# Patient Record
Sex: MALE | Race: WHITE | NOT HISPANIC OR LATINO | Employment: FULL TIME | URBAN - METROPOLITAN AREA
[De-identification: names, ages, dates, MRNs, and addresses within clinical notes are randomized per-mention and may not be internally consistent; named-entity substitution may affect disease eponyms.]

---

## 2017-01-18 ENCOUNTER — OFFICE VISIT (OUTPATIENT)
Dept: PHYSICAL MEDICINE AND REHAB | Facility: CLINIC | Age: 47
End: 2017-01-18
Payer: MEDICAID

## 2017-01-18 VITALS
HEART RATE: 64 BPM | WEIGHT: 197 LBS | DIASTOLIC BLOOD PRESSURE: 87 MMHG | BODY MASS INDEX: 29.18 KG/M2 | HEIGHT: 69 IN | SYSTOLIC BLOOD PRESSURE: 135 MMHG

## 2017-01-18 DIAGNOSIS — M54.32 SCIATICA OF LEFT SIDE: ICD-10-CM

## 2017-01-18 DIAGNOSIS — G44.229 CHRONIC TENSION-TYPE HEADACHE, NOT INTRACTABLE: ICD-10-CM

## 2017-01-18 DIAGNOSIS — G89.29 CHRONIC LOW BACK PAIN WITH SCIATICA, SCIATICA LATERALITY UNSPECIFIED, UNSPECIFIED BACK PAIN LATERALITY: ICD-10-CM

## 2017-01-18 DIAGNOSIS — M54.16 LUMBAR RADICULOPATHY: ICD-10-CM

## 2017-01-18 DIAGNOSIS — M54.2 NECK PAIN: ICD-10-CM

## 2017-01-18 DIAGNOSIS — M54.16 RIGHT LUMBAR RADICULOPATHY: ICD-10-CM

## 2017-01-18 DIAGNOSIS — M62.838 MUSCLE SPASM: Primary | ICD-10-CM

## 2017-01-18 DIAGNOSIS — M54.40 CHRONIC LOW BACK PAIN WITH SCIATICA, SCIATICA LATERALITY UNSPECIFIED, UNSPECIFIED BACK PAIN LATERALITY: ICD-10-CM

## 2017-01-18 PROCEDURE — 99999 PR PBB SHADOW E&M-EST. PATIENT-LVL III: CPT | Mod: PBBFAC,,, | Performed by: PHYSICAL MEDICINE & REHABILITATION

## 2017-01-18 PROCEDURE — 99213 OFFICE O/P EST LOW 20 MIN: CPT | Mod: PBBFAC | Performed by: PHYSICAL MEDICINE & REHABILITATION

## 2017-01-18 PROCEDURE — 99213 OFFICE O/P EST LOW 20 MIN: CPT | Mod: S$PBB,,, | Performed by: PHYSICAL MEDICINE & REHABILITATION

## 2017-01-18 RX ORDER — BUTALBITAL, ACETAMINOPHEN AND CAFFEINE 50; 325; 40 MG/1; MG/1; MG/1
1 TABLET ORAL EVERY 6 HOURS PRN
Qty: 120 TABLET | Refills: 2 | Status: SHIPPED | OUTPATIENT
Start: 2017-01-18 | End: 2017-04-18 | Stop reason: SDUPTHER

## 2017-01-18 NOTE — MR AVS SNAPSHOT
[facility]  [address]  [place] [address]  Phone: [phone]                  Linh Schmidt   2017 4:00 PM   Office Visit    Description:  Male : 1970   Provider:  Edwina Altman MD   Department:  [facility]           Reason for Visit     Back Pain           Diagnoses this Visit        Comments    Muscle spasm    -  Primary     Lumbar radiculopathy         Chronic low back pain with sciatica, sciatica laterality unspecified, unspecified back pain laterality         Sciatica of left side         Chronic tension-type headache, not intractable         Right lumbar radiculopathy         Neck pain                To Do List           Goals (5 Years of Data)              Today    10/18/16    7/18/16    Blood Pressure < 140/90   135/87  172/102  130/70    HDL > 40           LDL CHOLESTEROL < 130              These Medications        Disp Refills Start End    butalbital-acetaminophen-caffeine -40 mg (FIORICET, ESGIC) -40 mg per tablet 120 tablet 2 2017    Take 1 tablet by mouth every 6 (six) hours as needed for Pain or Headaches. - Oral    Pharmacy: [facility] - [place] - [address] Ph #: [phone]         [facility]     [facility] Nurse Care Line -  Assistance  Registered nurses in the [facility] provide clinical advisement, health education, appointment booking, and other advisory services.  Call for this free service at [phone].             Medications           Message regarding Medications     Verify the changes and/or additions to your medication regime listed below are the same as discussed with your clinician today.  If any of these changes or additions are incorrect, please notify your healthcare provider.        STOP taking these medications     sumatriptan (IMITREX) 100 MG tablet Take 100 mg by mouth daily as needed.           Verify that  "the below list of medications is an accurate representation of the medications you are currently taking.  If none reported, the list may be blank. If incorrect, please contact your healthcare provider. Carry this list with you in case of emergency.           Current Medications     amitriptyline (ELAVIL) 25 MG tablet Take 1 tablet (25 mg total) by mouth nightly as needed for Insomnia (headache).    atenolol (TENORMIN) 50 MG tablet Take 50 mg by mouth 2 (two) times daily.     gabapentin (NEURONTIN) 300 MG capsule take one in am/ 5 pm, and 2 caps at bedtime IF TOLERATED.    sertraline (ZOLOFT) 50 MG tablet Take 50 mg by mouth every 8 (eight) hours.    butalbital-acetaminophen-caffeine -40 mg (FIORICET, ESGIC) -40 mg per tablet Take 1 tablet by mouth every 6 (six) hours as needed for Pain or Headaches.    cyclobenzaprine (FLEXERIL) 10 MG tablet Take 1 tablet (10 mg total) by mouth 3 (three) times daily as needed for Muscle spasms (may cause sleepiness).    omeprazole (PRILOSEC) 20 MG capsule Take 20 mg by mouth once daily.           Clinical Reference Information           Vital Signs - Last Recorded  Most recent update: 1/18/2017  4:04 PM by Yasemin Mckeon MA    BP Pulse Ht Wt BMI    135/87 64 5' 9" (1.753 m) 89.4 kg (196 lb 15.7 oz) 29.09 kg/m2      Blood Pressure          Most Recent Value    BP  135/87      Allergies as of 1/18/2017     No Known Allergies      Immunizations Administered on Date of Encounter - 1/18/2017     None      [facility] Sign-Up     Activating your [facility] account is as easy as 1-2-3!     1) Visit [email or web address], select Sign Up Now, enter this activation code and your date of birth, then select Next.  [email or web address]  Expires: 3/4/2017  4:22 PM      2) Create a username and password to use when you visit [facility] in the future and select a security question in case you lose your password and select Next.    3) Enter your e-mail address and click Sign Up!    Additional " Information  If you have questions, please e-mail [email or web address] or call [phone] to talk to our [facility] staff. Remember, [facility] is NOT to be used for urgent needs. For medical emergencies, dial 911.

## 2017-01-18 NOTE — PROGRESS NOTES
Subjective:       Patient ID: Linh Schmidt is a 46 y.o. male.    Chief Complaint: Back Pain    Back Pain   Associated symptoms include headaches and leg pain. Pertinent negatives include no abdominal pain, chest pain or fever.   Leg Pain      Neck Pain    Associated symptoms include headaches and leg pain. Pertinent negatives include no chest pain, fever or trouble swallowing.   Headache    Associated symptoms include back pain and neck pain. Pertinent negatives include no abdominal pain, dizziness, eye pain or fever.   Mr. Schmidt returns to clinic for chronic back, and neck pain.  LCV 10/18/16.  He reports that his back pain is well controled, and headaches with current pain management.   Today he complains about neck and back pain.     #1 is in back and left leg pain.   Current back and leg pain is 3, and worst pain is 6.   Back pain radiates only to right leg pain.  Back pain and right leg pain is constant every day pain , with changes in intensity,   becomes severe once a month.   Back pain is described as early morning stiffness, dull ache, soreness like pain, that goes down to right leg, as numbness.   Bending forward increases, and changes pain to shooting, stinging  pain in right leg, down the right buttock,   back of thigh, and calf  (does not go to foot/ heel), rarely feels as electric shock.  Bending forward, activities, increase the pain, lying on hard surface improves the pain.   No limitation in walking sitting ( prolong sitting increase the pain).      # 2 neck pain.  He has a worsening of chronic neck pain foe few days.   He has most recent flare up for last week. No injury reported.   Current neck pain is 3 worst pain is 5-6.   He reports that neck pain radiates to sides to both arms and in between shoulder blades.   Neck pain is off/on pain, mainly during day time, and neck feels stiff, jeffrey. early in morning.   Neck pain  Radiates to both arms, with shooting pain down the both arms, down to  "wrists, Lt > Rt.   denies numbness , weakness, burning sensation in arms.   Fioricet ( takes 3-4 tabs/days) and Neurontin in evening helps.      #3 Headache, getting more frequent , maybe 3x / week.   But does not take Elavil regularly.  Takes Fioricet 2 tabs BID, for HA pain.   For the breakthrough he takes Ibuprofen or aspirin, that helps.   Does not take Imitrex.           Takes Gabapentin, one in am,and 2 tabs at bedtime,  and Fioricet 1-2 tablet /day on good day,   and one TID on bad day, up to 4 /day for the most severe pain.  Exercises regularly , goes to gym 5 x /week, in past he was picking up heavy weight up 340 lbs,   squating, stop " squats  2 yrs ago (because of back/ leg pain).   Now doing only bench press ~ 100 llbs, 3-4 x/week.   Here alessandro follow up and medications adjustment.    Review of Systems   Constitutional: Negative for activity change, appetite change, chills, diaphoresis, fatigue, fever and unexpected weight change.   HENT: Negative for trouble swallowing and voice change.    Eyes: Negative for pain and visual disturbance.   Respiratory: Negative for chest tightness, shortness of breath and wheezing.    Cardiovascular: Negative for chest pain, palpitations and leg swelling.   Gastrointestinal: Negative for abdominal pain, constipation and diarrhea.   Genitourinary: Negative for difficulty urinating, frequency and urgency.   Musculoskeletal: Positive for back pain and neck pain. Negative for arthralgias, joint swelling, myalgias and neck stiffness.   Skin: Negative for rash and wound.   Neurological: Positive for headaches. Negative for dizziness, facial asymmetry, speech difficulty and light-headedness.   Hematological: Negative for adenopathy.   Psychiatric/Behavioral: Negative for agitation, behavioral problems, confusion, decreased concentration, dysphoric mood and sleep disturbance.       Objective:      Physical Exam    GENERAL: The patient is alert, oriented, pleasant. "   MUSCULOSKELETAL:   Gait is normal limits.   Cervical spine full range of motion in all planes.   Lumbar spine, full range of motion in all planes, flexion 90 degrees,    Side bending and rotation to 20-25 degrees.  Straight leg raising Positive on Right .   Full range of motion in all joints x4 extremities.   Muscle strength 5/5 throughout x4 extremities.   No  joint laxity throughout x4 extremities.   NEUROLOGIC: Cranial nerves II through XII intact.   Deep tendon reflexes is normal, +2 in the upper and lower extremities bilaterally.   Muscle tone is normal.   Babinski is Negative bilaterally.   Sensory is intact to light touch and pinprick throughout x4 extremities.     X ray of Lumbar spine showed Narrowed L4 -- L5 disk space    MRI of lumbar spine showed:   Degenerative changes at L4-5, contributing to moderate right neural foraminal stenosis,    possibly impinging upon the exiting right L4 nerve root.  Moderate right-sided facet joint arthropathy noted at L4-5 and L5-S1.     Assessment:       1. Muscle spasm    2. Lumbar radiculopathy    3. Chronic low back pain with sciatica, sciatica laterality unspecified, unspecified back pain laterality    4. Sciatica of left side    5. Chronic tension-type headache, not intractable        Plan:       Muscle spasm  -     butalbital-acetaminophen-caffeine -40 mg (FIORICET, ESGIC) -40 mg per tablet; Take 1 tablet by mouth every 6 (six) hours as needed for Pain or Headaches.  Dispense: 120 tablet; Refill: 2    Lumbar radiculopathy  -     butalbital-acetaminophen-caffeine -40 mg (FIORICET, ESGIC) -40 mg per tablet; Take 1 tablet by mouth every 6 (six) hours as needed for Pain or Headaches.  Dispense: 120 tablet; Refill: 2    Chronic low back pain with sciatica, sciatica laterality unspecified, unspecified back pain laterality  -     butalbital-acetaminophen-caffeine -40 mg (FIORICET, ESGIC) -40 mg per tablet; Take 1 tablet by mouth every 6  (six) hours as needed for Pain or Headaches.  Dispense: 120 tablet; Refill: 2    Sciatica of left side  -     butalbital-acetaminophen-caffeine -40 mg (FIORICET, ESGIC) -40 mg per tablet; Take 1 tablet by mouth every 6 (six) hours as needed for Pain or Headaches.  Dispense: 120 tablet; Refill: 2    Chronic tension-type headache, not intractable  -     butalbital-acetaminophen-caffeine -40 mg (FIORICET, ESGIC) -40 mg per tablet; Take 1 tablet by mouth every 6 (six) hours as needed for Pain or Headaches.  Dispense: 120 tablet; Refill: 2    Right lumbar radiculopathy  -     butalbital-acetaminophen-caffeine -40 mg (FIORICET, ESGIC) -40 mg per tablet; Take 1 tablet by mouth every 6 (six) hours as needed for Pain or Headaches.  Dispense: 120 tablet; Refill: 2    Neck pain  -     butalbital-acetaminophen-caffeine -40 mg (FIORICET, ESGIC) -40 mg per tablet; Take 1 tablet by mouth every 6 (six) hours as needed for Pain or Headaches.  Dispense: 120 tablet; Refill: 2      Patient with chronic back pain secondary to DJD, DDD of Lumbar spine, with possible Right L 4-5 radiculopathy.   Also with chronic neck pain, secondary to cervical spondylosis.    1. Pain management :   Resume  Neurontin, 300 mg one at bedtime for neuropathic pain.    Elavil prescription given again, that he takes for HA, that helps him with insomnia.  Resume Fioricet that he was taking for HA, but helps best with back pain,and does not make him sleepy.     Prescriptions for 3 months given in hand (previously given electronic scripts were canceled by Pharmacy).          Follow up in 3 months.      Total time spent face to face with patient was 15 minutes.   More than 50% of that time was spent in counseling on diagnosis , prognosis and treatment options.   I also caunsel patient  on common and most usual side effect of prescribed medications.  I reviewed Primary care , and other specialty's notes to better  coordinate patient's  care.   All questions were answered, and patient voiced understanding.

## 2017-04-18 ENCOUNTER — OFFICE VISIT (OUTPATIENT)
Dept: PHYSICAL MEDICINE AND REHAB | Facility: CLINIC | Age: 47
End: 2017-04-18
Payer: MEDICAID

## 2017-04-18 VITALS
DIASTOLIC BLOOD PRESSURE: 91 MMHG | SYSTOLIC BLOOD PRESSURE: 155 MMHG | WEIGHT: 188.19 LBS | HEIGHT: 69 IN | HEART RATE: 76 BPM | BODY MASS INDEX: 27.87 KG/M2

## 2017-04-18 DIAGNOSIS — M54.16 RIGHT LUMBAR RADICULOPATHY: ICD-10-CM

## 2017-04-18 DIAGNOSIS — M62.838 MUSCLE SPASM: ICD-10-CM

## 2017-04-18 DIAGNOSIS — G89.29 CHRONIC BILATERAL LOW BACK PAIN WITH RIGHT-SIDED SCIATICA: ICD-10-CM

## 2017-04-18 DIAGNOSIS — M54.30 SCIATICA, UNSPECIFIED LATERALITY: ICD-10-CM

## 2017-04-18 DIAGNOSIS — M54.12 LEFT CERVICAL RADICULOPATHY: ICD-10-CM

## 2017-04-18 DIAGNOSIS — M54.16 LUMBAR RADICULOPATHY: Primary | ICD-10-CM

## 2017-04-18 DIAGNOSIS — M54.40 CHRONIC LOW BACK PAIN WITH SCIATICA, SCIATICA LATERALITY UNSPECIFIED, UNSPECIFIED BACK PAIN LATERALITY: ICD-10-CM

## 2017-04-18 DIAGNOSIS — M54.2 NECK PAIN: ICD-10-CM

## 2017-04-18 DIAGNOSIS — M54.32 SCIATICA OF LEFT SIDE: ICD-10-CM

## 2017-04-18 DIAGNOSIS — G44.229 CHRONIC TENSION-TYPE HEADACHE, NOT INTRACTABLE: ICD-10-CM

## 2017-04-18 DIAGNOSIS — M54.16 BILATERAL LUMBAR RADICULOPATHY: ICD-10-CM

## 2017-04-18 DIAGNOSIS — M54.41 CHRONIC BILATERAL LOW BACK PAIN WITH RIGHT-SIDED SCIATICA: ICD-10-CM

## 2017-04-18 DIAGNOSIS — G89.29 CHRONIC LOW BACK PAIN WITH SCIATICA, SCIATICA LATERALITY UNSPECIFIED, UNSPECIFIED BACK PAIN LATERALITY: ICD-10-CM

## 2017-04-18 PROCEDURE — 99213 OFFICE O/P EST LOW 20 MIN: CPT | Mod: PBBFAC | Performed by: PHYSICAL MEDICINE & REHABILITATION

## 2017-04-18 PROCEDURE — 99213 OFFICE O/P EST LOW 20 MIN: CPT | Mod: S$PBB,,, | Performed by: PHYSICAL MEDICINE & REHABILITATION

## 2017-04-18 PROCEDURE — 99999 PR PBB SHADOW E&M-EST. PATIENT-LVL III: CPT | Mod: PBBFAC,,, | Performed by: PHYSICAL MEDICINE & REHABILITATION

## 2017-04-18 RX ORDER — BUTALBITAL, ACETAMINOPHEN AND CAFFEINE 50; 325; 40 MG/1; MG/1; MG/1
1 TABLET ORAL EVERY 6 HOURS PRN
Qty: 120 TABLET | Refills: 2 | Status: SHIPPED | OUTPATIENT
Start: 2017-04-18 | End: 2017-07-18 | Stop reason: SDUPTHER

## 2017-04-18 RX ORDER — NAPROXEN SODIUM 550 MG/1
550 TABLET ORAL 2 TIMES DAILY WITH MEALS
Qty: 180 TABLET | Refills: 0 | Status: SHIPPED | OUTPATIENT
Start: 2017-04-18 | End: 2017-10-19 | Stop reason: SDUPTHER

## 2017-04-18 RX ORDER — GABAPENTIN 300 MG/1
600 CAPSULE ORAL NIGHTLY
Qty: 60 CAPSULE | Refills: 11 | Status: SHIPPED | OUTPATIENT
Start: 2017-04-18 | End: 2017-04-19 | Stop reason: SDUPTHER

## 2017-04-18 NOTE — MR AVS SNAPSHOT
[facility]  [address]  [facility] [address]  Phone: [phone]                  Linh Schmidt   2017 3:00 PM   Office Visit    Description:  Male : 1970   Provider:  Edwina Altman MD   Department:  [facility]           Reason for Visit     Leg Pain           Diagnoses this Visit        Comments    Lumbar radiculopathy    -  Primary     Chronic low back pain with sciatica, sciatica laterality unspecified, unspecified back pain laterality         Chronic tension-type headache, not intractable         Muscle spasm         Right lumbar radiculopathy         Neck pain         Sciatica of left side         Bilateral lumbar radiculopathy         Chronic bilateral low back pain with right-sided sciatica         Sciatica, unspecified laterality         Left cervical radiculopathy                To Do List           Goals (5 Years of Data)              Today    1/18/17    10/18/16    Blood Pressure < 140/90   155/91  155/91  155/91    HDL > 40           LDL CHOLESTEROL < 130             Follow-Up and Disposition     Return in about 3 months (around 2017).       These Medications        Disp Refills Start End    butalbital-acetaminophen-caffeine -40 mg (FIORICET, ESGIC) -40 mg per tablet 120 tablet 2 2017    Take 1 tablet by mouth every 6 (six) hours as needed for Pain or Headaches. - Oral    Pharmacy: [facility] [address] [place] [address] (), [place] - [address] Ph #: [phone]       gabapentin (NEURONTIN) 300 MG capsule 60 capsule 11 2017    Take 2 capsules (600 mg total) by mouth every evening. take one in am/ 5 pm, and 2 caps at bedtime IF TOLERATED. - Oral    Pharmacy: [facility] [address] [place], [place] [address] - [address] Ph #: [phone]       naproxen sodium (ANAPROX) 550 MG tablet 180 tablet 0 2017    Take 1 tablet (550 mg total) by mouth 2 (two) times  daily with meals. - Oral    Pharmacy: [facility] - [place]), [place] - [address] Ph #: [phone]         [facility]     [facility] Care [facility] - 24/7 Assistance  Unless otherwise directed by your provider, please contact [facility], our nurse care line that is available for 24/7 assistance.     Registered nurses in the [facility] provide: appointment scheduling, clinical advisement, health education, and other advisory services.  Call: [phone] (toll free)               Medications           Message regarding Medications     Verify the changes and/or additions to your medication regime listed below are the same as discussed with your clinician today.  If any of these changes or additions are incorrect, please notify your healthcare provider.        START taking these NEW medications        Refills    naproxen sodium (ANAPROX) 550 MG tablet 0    Sig: Take 1 tablet (550 mg total) by mouth 2 (two) times daily with meals.    Class: Normal    Route: Oral      CHANGE how you are taking these medications     Start Taking Instead of    gabapentin (NEURONTIN) 300 MG capsule gabapentin (NEURONTIN) 300 MG capsule    Dosage:  Take 2 capsules (600 mg total) by mouth every evening. take one in am/ 5 pm, and 2 caps at bedtime IF TOLERATED. Dosage:  take one in am/ 5 pm, and 2 caps at bedtime IF TOLERATED.    Reason for Change:  Reorder       STOP taking these medications     cyclobenzaprine (FLEXERIL) 10 MG tablet Take 1 tablet (10 mg total) by mouth 3 (three) times daily as needed for Muscle spasms (may cause sleepiness).    amitriptyline (ELAVIL) 25 MG tablet Take 1 tablet (25 mg total) by mouth nightly as needed for Insomnia (headache).           Verify that the below list of medications is an accurate representation of the medications you are currently taking.  If none reported, the list may be blank. If incorrect, please contact your healthcare provider. Carry  "this list with you in case of emergency.           Current Medications     atenolol (TENORMIN) 50 MG tablet Take 50 mg by mouth 2 (two) times daily.     gabapentin (NEURONTIN) 300 MG capsule Take 2 capsules (600 mg total) by mouth every evening. take one in am/ 5 pm, and 2 caps at bedtime IF TOLERATED.    sertraline (ZOLOFT) 50 MG tablet Take 50 mg by mouth every 8 (eight) hours.    butalbital-acetaminophen-caffeine -40 mg (FIORICET, ESGIC) -40 mg per tablet Take 1 tablet by mouth every 6 (six) hours as needed for Pain or Headaches.    naproxen sodium (ANAPROX) 550 MG tablet Take 1 tablet (550 mg total) by mouth 2 (two) times daily with meals.    omeprazole (PRILOSEC) 20 MG capsule Take 20 mg by mouth once daily.           Clinical Reference Information           Your Vitals Were     BP Pulse Height Weight BMI    155/91 76 5' 9" (1.753 m) 85.3 kg (188 lb 2.6 oz) 27.79 kg/m2      Blood Pressure          Most Recent Value    BP  (!)  155/91      Allergies as of 4/18/2017     No Known Allergies      Immunizations Administered on Date of Encounter - 4/18/2017     None      Orders Placed During Today's Visit     Future Labs/Procedures Expected by Expires    HEPATIC FUNCTION PANEL  4/18/2017 6/17/2018      [facility] Sign-Up     Activating your [facility] account is as easy as 1-2-3!     1) Visit [email or web address], select Sign Up Now, enter this activation code and your date of birth, then select Next.  [email or web address]  Expires: 6/2/2017  3:03 PM      2) Create a username and password to use when you visit [facility] in the future and select a security question in case you lose your password and select Next.    3) Enter your e-mail address and click Sign Up!    Additional Information  If you have questions, please e-mail [email or web address] or call [phone] to talk to our [facility] staff. Remember, [facility] is NOT to be used for urgent needs. For medical emergencies, dial 911.         Language Assistance " Services     ATTENTION: Language assistance services are available, free of charge. Please call [phone].      ATENCIÓN: Si habla español, tiene a logan disposición servicios gratuitos de asistencia lingüística. Llame al [phone].     CHÚ Ý: N?u b?n nói Ti?ng Vi?t, có các d?ch v? h? tr? ngôn ng? mi?n phí dành cho b?n. G?i s? [phone].         [facility] complies with applicable Federal civil rights laws and does not discriminate on the basis of race, color, national origin, age, disability, or sex.

## 2017-04-18 NOTE — PROGRESS NOTES
Subjective:       Patient ID: Linh Schmidt is a 46 y.o. male.    Chief Complaint: Leg Pain    Leg Pain      Back Pain   Associated symptoms include headaches and leg pain. Pertinent negatives include no abdominal pain, chest pain or fever.   Neck Pain    Associated symptoms include headaches and leg pain. Pertinent negatives include no chest pain, fever or trouble swallowing.   Headache    Associated symptoms include back pain and neck pain. Pertinent negatives include no abdominal pain, dizziness, eye pain or fever.   Mr. Schmidt returns to clinic for chronic back, and neck pain.  V 01/18/17.   He reports that his back pain is well controled, and headaches with current pain management.   Today he complains about neck and back pain.     #1 is in back and left leg pain.   Current back and leg pain is 3, and worst pain is 6.   Back pain radiates only to right leg pain.  Back pain and right leg pain is constant every day pain , with changes in intensity,   becomes severe once a month.   Back pain is described as early morning stiffness, dull ache, soreness like pain, that goes down to right leg, as numbness.   Bending forward increases, and changes pain to shooting, stinging  pain in right leg, down the right buttock,   back of thigh, and calf  (does not go to foot/ heel), rarely feels as electric shock.  Bending forward, activities, increase the pain, lying on hard surface improves the pain.   No limitation in walking, prolong sitting increases the pain..      # 2 neck pain.  He has a worsening of chronic neck pain foe few days.   He has most recent flare up for last week. No injury reported.   Current neck pain is 3 worst pain is 5-6.   He reports that neck pain radiates to sides to both arms and in between shoulder blades.   Neck pain is off/on pain, mainly during day time, and neck feels stiff, jeffrey. early in morning.   Neck pain  Radiates to both arms, with shooting pain down the both arms, down to wrists, Lt >  "Rt.   denies numbness , weakness, burning sensation in arms.   Fioricet ( takes 3-4 tabs/days) and Neurontin in evening helps.      #3 Headache, getting more frequent , maybe 3x / week.   But does not take Elavil regularly.  Takes Fioricet 2 tabs BID, for HA pain.   For the breakthrough he takes Ibuprofen or aspirin, that helps.   Does not take Imitrex.           Takes Gabapentin, one in am,and 2 tabs at bedtime,  and Fioricet 1-2 tablet /day on good day,   and one TID on bad day, up to 4 /day for the most severe pain.  Exercises regularly , goes to gym 5 x /week, in past he was picking up heavy weight up 340 lbs,   squating, stop " squats  2 yrs ago (because of back/ leg pain).   Now doing only bench press ~ 100 llbs, 3-4 x/week.   Here [facility] follow up and medications adjustment.    Past Medical History:   Diagnosis Date    Anxiety     GERD (gastroesophageal reflux disease)     Hypertension     Osteoarthritis        Past Surgical History:   Procedure Laterality Date    dental implants         Family History   Problem Relation Age of Onset    Mental illness Mother     Diabetes Maternal Grandmother     Heart disease Maternal Grandfather     Hyperlipidemia Maternal Grandfather     Stroke Maternal Grandfather        Social History     Social History    Marital status:      Spouse name: N/A    Number of children: N/A    Years of education: N/A     Social History Main Topics    Smoking status: Former Smoker    Smokeless tobacco: Never Used    Alcohol use No    Drug use: No    Sexual activity: Yes     Partners: Female     Other Topics Concern    Patient Feels They Ought To Cut Down On Drinking/Drug Use No    Patient Annoyed By Others Criticizing Their Drinking/Drug Use No    Patient Has Felt Bad Or Guilty About Drinking/Drug Use No    Patient Has Had A Drink/Used Drugs As An Eye Opener In The Am No     Social History Narrative       Current Outpatient Prescriptions   Medication Sig Dispense " Refill    atenolol (TENORMIN) 50 MG tablet Take 50 mg by mouth 2 (two) times daily.       gabapentin (NEURONTIN) 300 MG capsule Take 2 capsules (600 mg total) by mouth every evening. 60 capsule 11    sertraline (ZOLOFT) 50 MG tablet Take 50 mg by mouth every 8 (eight) hours.      butalbital-acetaminophen-caffeine -40 mg (FIORICET, ESGIC) -40 mg per tablet Take 1 tablet by mouth every 6 (six) hours as needed for Pain or Headaches. 120 tablet 2    naproxen sodium (ANAPROX) 550 MG tablet Take 1 tablet (550 mg total) by mouth 2 (two) times daily with meals. 180 tablet 0    omeprazole (PRILOSEC) 20 MG capsule Take 20 mg by mouth once daily.       No current facility-administered medications for this visit.        Review of patient's allergies indicates:  No Known Allergies    Review of Systems   Constitutional: Negative for activity change, appetite change, chills, diaphoresis, fatigue, fever and unexpected weight change.   HENT: Negative for trouble swallowing and voice change.    Eyes: Negative for pain and visual disturbance.   Respiratory: Negative for chest tightness, shortness of breath and wheezing.    Cardiovascular: Negative for chest pain, palpitations and leg swelling.   Gastrointestinal: Negative for abdominal pain, constipation and diarrhea.   Genitourinary: Negative for difficulty urinating, frequency and urgency.   Musculoskeletal: Positive for back pain and neck pain. Negative for arthralgias, joint swelling, myalgias and neck stiffness.   Skin: Negative for rash and wound.   Neurological: Positive for headaches. Negative for dizziness, facial asymmetry, speech difficulty and light-headedness.   Hematological: Negative for adenopathy.   Psychiatric/Behavioral: Negative for agitation, behavioral problems, confusion, decreased concentration, dysphoric mood and sleep disturbance.       Objective:      Physical Exam    GENERAL: The patient is alert, oriented, pleasant.   MUSCULOSKELETAL:    Gait is normal limits.   Cervical spine full range of motion in all planes.   Lumbar spine, full range of motion in all planes, flexion 90 degrees,    Side bending and rotation to 20-25 degrees.  Straight leg raising Positive on Right .   Full range of motion in all joints x4 extremities.   Muscle strength 5/5 throughout x4 extremities.   No  joint laxity throughout x4 extremities.   NEUROLOGIC: Cranial nerves II through XII intact.   Deep tendon reflexes is normal, +2 in the upper and lower extremities bilaterally.   Muscle tone is normal.   Babinski is Negative bilaterally.   Sensory is intact to light touch and pinprick throughout x4 extremities.     X ray of Lumbar spine showed Narrowed L4 -- L5 disk space    MRI of lumbar spine showed:   Degenerative changes at L4-5, contributing to moderate right neural foraminal stenosis,    possibly impinging upon the exiting right L4 nerve root.  Moderate right-sided facet joint arthropathy noted at L4-5 and L5-S1.     Assessment:       1. Lumbar radiculopathy    2. Chronic low back pain with sciatica, sciatica laterality unspecified, unspecified back pain laterality    3. Chronic tension-type headache, not intractable    4. Muscle spasm    5. Right lumbar radiculopathy    6. Neck pain    7. Sciatica of left side        Plan:       Lumbar radiculopathy  -     butalbital-acetaminophen-caffeine -40 mg (FIORICET, ESGIC) -40 mg per tablet; Take 1 tablet by mouth every 6 (six) hours as needed for Pain or Headaches.  Dispense: 120 tablet; Refill: 2    Chronic low back pain with sciatica, sciatica laterality unspecified, unspecified back pain laterality  -     butalbital-acetaminophen-caffeine -40 mg (FIORICET, ESGIC) -40 mg per tablet; Take 1 tablet by mouth every 6 (six) hours as needed for Pain or Headaches.  Dispense: 120 tablet; Refill: 2    Chronic tension-type headache, not intractable  -     butalbital-acetaminophen-caffeine -40 mg  (FIORICET, ESGIC) -40 mg per tablet; Take 1 tablet by mouth every 6 (six) hours as needed for Pain or Headaches.  Dispense: 120 tablet; Refill: 2    Muscle spasm  -     butalbital-acetaminophen-caffeine -40 mg (FIORICET, ESGIC) -40 mg per tablet; Take 1 tablet by mouth every 6 (six) hours as needed for Pain or Headaches.  Dispense: 120 tablet; Refill: 2    Right lumbar radiculopathy  -     butalbital-acetaminophen-caffeine -40 mg (FIORICET, ESGIC) -40 mg per tablet; Take 1 tablet by mouth every 6 (six) hours as needed for Pain or Headaches.  Dispense: 120 tablet; Refill: 2    Neck pain  -     butalbital-acetaminophen-caffeine -40 mg (FIORICET, ESGIC) -40 mg per tablet; Take 1 tablet by mouth every 6 (six) hours as needed for Pain or Headaches.  Dispense: 120 tablet; Refill: 2    Sciatica of left side  -     butalbital-acetaminophen-caffeine -40 mg (FIORICET, ESGIC) -40 mg per tablet; Take 1 tablet by mouth every 6 (six) hours as needed for Pain or Headaches.  Dispense: 120 tablet; Refill: 2      Patient with chronic back pain secondary to DJD, DDD of Lumbar spine, with possible Right L 4-5 radiculopathy.   Also with chronic neck pain, secondary to cervical spondylosis.     1. Pain management :   Resume  Neurontin, 300 mg one at bedtime for neuropathic pain.    He takes Fioricet and Gabapentin  for HA, and all MSK pains.   Resume Fioricet that he was taking for HA, but helps best with back pain,and does not make him sleepy.    Stopped Elavil, gives him hangover.   Prescriptions for 3 months given in hand.         Follow up in 3 months.     Total time spent face to face with patient was 15 minutes.   More than 50% of that time was spent in counseling on diagnosis , prognosis and treatment options.   I also caunsel patient  on common and most usual side effect of prescribed medications.  I reviewed Primary care , and other specialty's notes to better coordinate  patient's  care.   All questions were answered, and patient voiced understanding.

## 2017-04-19 RX ORDER — GABAPENTIN 300 MG/1
600 CAPSULE ORAL NIGHTLY
Qty: 60 CAPSULE | Refills: 11 | Status: SHIPPED | OUTPATIENT
Start: 2017-04-19 | End: 2018-10-12

## 2017-07-18 ENCOUNTER — OFFICE VISIT (OUTPATIENT)
Dept: PHYSICAL MEDICINE AND REHAB | Facility: CLINIC | Age: 47
End: 2017-07-18
Payer: MEDICAID

## 2017-07-18 VITALS
BODY MASS INDEX: 28.2 KG/M2 | DIASTOLIC BLOOD PRESSURE: 60 MMHG | HEART RATE: 65 BPM | SYSTOLIC BLOOD PRESSURE: 133 MMHG | WEIGHT: 190.38 LBS | HEIGHT: 69 IN

## 2017-07-18 DIAGNOSIS — M54.16 RIGHT LUMBAR RADICULOPATHY: ICD-10-CM

## 2017-07-18 DIAGNOSIS — G44.201 INTRACTABLE TENSION-TYPE HEADACHE, UNSPECIFIED CHRONICITY PATTERN: ICD-10-CM

## 2017-07-18 DIAGNOSIS — M54.32 SCIATICA OF LEFT SIDE: ICD-10-CM

## 2017-07-18 DIAGNOSIS — G44.229 CHRONIC TENSION-TYPE HEADACHE, NOT INTRACTABLE: ICD-10-CM

## 2017-07-18 DIAGNOSIS — M54.2 NECK PAIN: ICD-10-CM

## 2017-07-18 DIAGNOSIS — M54.40 CHRONIC LOW BACK PAIN WITH SCIATICA, SCIATICA LATERALITY UNSPECIFIED, UNSPECIFIED BACK PAIN LATERALITY: Primary | ICD-10-CM

## 2017-07-18 DIAGNOSIS — G89.29 CHRONIC LOW BACK PAIN WITH SCIATICA, SCIATICA LATERALITY UNSPECIFIED, UNSPECIFIED BACK PAIN LATERALITY: Primary | ICD-10-CM

## 2017-07-18 DIAGNOSIS — M54.16 LUMBAR RADICULOPATHY: ICD-10-CM

## 2017-07-18 DIAGNOSIS — M62.838 MUSCLE SPASM: ICD-10-CM

## 2017-07-18 PROCEDURE — 99213 OFFICE O/P EST LOW 20 MIN: CPT | Mod: PBBFAC | Performed by: PHYSICAL MEDICINE & REHABILITATION

## 2017-07-18 PROCEDURE — 99999 PR PBB SHADOW E&M-EST. PATIENT-LVL III: CPT | Mod: PBBFAC,,, | Performed by: PHYSICAL MEDICINE & REHABILITATION

## 2017-07-18 PROCEDURE — 99213 OFFICE O/P EST LOW 20 MIN: CPT | Mod: S$PBB,,, | Performed by: PHYSICAL MEDICINE & REHABILITATION

## 2017-07-18 RX ORDER — BUTALBITAL, ACETAMINOPHEN AND CAFFEINE 50; 325; 40 MG/1; MG/1; MG/1
1 TABLET ORAL EVERY 6 HOURS PRN
Qty: 120 TABLET | Refills: 2 | Status: SHIPPED | OUTPATIENT
Start: 2017-07-18 | End: 2017-10-19 | Stop reason: SDUPTHER

## 2017-07-18 NOTE — PROGRESS NOTES
Subjective:       Patient ID: Linh Schmidt is a 47 y.o. male.    Chief Complaint: Back Pain and Migraine    Leg Pain      Back Pain   Associated symptoms include headaches and leg pain. Pertinent negatives include no abdominal pain, chest pain or fever.   Neck Pain    Associated symptoms include headaches and leg pain. Pertinent negatives include no chest pain, fever or trouble swallowing.   Headache    Associated symptoms include back pain and neck pain. Pertinent negatives include no abdominal pain, dizziness, eye pain or fever.   Migraine    Associated symptoms include back pain and neck pain. Pertinent negatives include no abdominal pain, dizziness, eye pain or fever.   Mr. Schmidt returns to clinic for chronic back, and neck pain.  LCV 04/18/17.   He reports that his back pain is well controled, and headaches with current pain management, Fioricet,   That he is taking quite a long with no increase in dose.  Today he complains about neck and back pain.     #1 is in back and left leg pain.   Current back and leg pain is 3, and worst pain is 6.   Back pain radiates only to right leg pain.  Back pain and right leg pain is constant every day pain , with changes in intensity,   becomes severe once a month.   Back pain is described as early morning stiffness, dull ache, soreness like pain, that goes down to right leg, as numbness.   Bending forward increases, and changes pain to shooting, stinging  pain in right leg, down the right buttock,   back of thigh, and calf  (does not go to foot/ heel), rarely feels as electric shock.  Bending forward, activities, increase the pain, lying on hard surface improves the pain.   No limitation in walking, prolong sitting increases the pain.    # 2 neck pain.  He has a worsening of chronic neck pain foe few days.   He has most recent flare up for last week. No injury reported.   Current neck pain is 3/10, worst pain is 6/10.   He reports that neck pain radiates to sides to both  "arms and in between shoulder blades.   Neck pain is off/on pain, mainly during day time, and neck feels stiff, jeffrey. early in morning.   Neck pain  Radiates to both arms, with shooting pain down the both arms, down to wrists, Lt > Rt.   denies numbness , weakness, burning sensation in arms.   Fioricet ( takes 3-4 tabs/days) and Neurontin in evening helps.      #3 Headache, getting more frequent , maybe 3x / week.   But does not take Elavil regularly.  Takes Fioricet 2 tabs BID, for HA pain.   For the breakthrough he takes Ibuprofen or aspirin, that helps.   Does not take Imitrex.   Takes Gabapentin, one in am,and 2 tabs at bedtime,  and Fioricet 1-2 tablet /day on good day,   and one TID on bad day, up to 4 /day for the most severe pain.  Exercises regularly , goes to gym 5 x /week, in past he was picking up heavy weight up 340 lbs,   squating, stop " squats  2 yrs ago (because of back/ leg pain).   Now doing only bench press ~ 100 llbs, 3-4 x/week.   Here al[facility]andro follow up and medications adjustment.    Past Medical History:   Diagnosis Date    Anxiety     GERD (gastroesophageal reflux disease)     Hypertension     Osteoarthritis        Past Surgical History:   Procedure Laterality Date    dental implants         Family History   Problem Relation Age of Onset    Mental illness Mother     Diabetes Maternal Grandmother     Heart disease Maternal Grandfather     Hyperlipidemia Maternal Grandfather     Stroke Maternal Grandfather        Social History     Social History    Marital status:      Spouse name: N/A    Number of children: N/A    Years of education: N/A     Social History Main Topics    Smoking status: Former Smoker    Smokeless tobacco: Never Used    Alcohol use No    Drug use: No    Sexual activity: Yes     Partners: Female     Other Topics Concern    Patient Feels They Ought To Cut Down On Drinking/Drug Use No    Patient Annoyed By Others Criticizing Their Drinking/Drug Use No    " Patient Has Felt Bad Or Guilty About Drinking/Drug Use No    Patient Has Had A Drink/Used Drugs As An Eye Opener In The Am No     Social History Narrative    None       Current Outpatient Prescriptions   Medication Sig Dispense Refill    atenolol (TENORMIN) 50 MG tablet Take 50 mg by mouth 2 (two) times daily.       butalbital-acetaminophen-caffeine -40 mg (FIORICET, ESGIC) -40 mg per tablet Take 1 tablet by mouth every 6 (six) hours as needed for Pain or Headaches. 120 tablet 2    gabapentin (NEURONTIN) 300 MG capsule Take 2 capsules (600 mg total) by mouth every evening. 60 capsule 11    naproxen sodium (ANAPROX) 550 MG tablet Take 1 tablet (550 mg total) by mouth 2 (two) times daily with meals. 180 tablet 0    omeprazole (PRILOSEC) 20 MG capsule Take 20 mg by mouth once daily.      sertraline (ZOLOFT) 50 MG tablet Take 50 mg by mouth every 8 (eight) hours.       No current facility-administered medications for this visit.        Review of patient's allergies indicates:  No Known Allergies    Review of Systems   Constitutional: Negative for activity change, appetite change, chills, diaphoresis, fatigue, fever and unexpected weight change.   HENT: Negative for trouble swallowing and voice change.    Eyes: Negative for pain and visual disturbance.   Respiratory: Negative for chest tightness, shortness of breath and wheezing.    Cardiovascular: Negative for chest pain, palpitations and leg swelling.   Gastrointestinal: Negative for abdominal pain, constipation and diarrhea.   Genitourinary: Negative for difficulty urinating, frequency and urgency.   Musculoskeletal: Positive for back pain and neck pain. Negative for arthralgias, joint swelling, myalgias and neck stiffness.   Skin: Negative for rash and wound.   Neurological: Positive for headaches. Negative for dizziness, facial asymmetry, speech difficulty and light-headedness.   Hematological: Negative for adenopathy.   Psychiatric/Behavioral:  Negative for agitation, behavioral problems, confusion, decreased concentration, dysphoric mood and sleep disturbance.       Objective:      Physical Exam    GENERAL: The patient is alert, oriented, pleasant.   MUSCULOSKELETAL:   Gait is normal limits.   Cervical spine full range of motion in all planes.   Lumbar spine, full range of motion in all planes, flexion 90 degrees,    Side bending and rotation to 20-25 degrees.  Straight leg raising Positive on Right .   Full range of motion in all joints x4 extremities.   Muscle strength 5/5 throughout x4 extremities.   No  joint laxity throughout x4 extremities.   NEUROLOGIC: Cranial nerves II through XII intact.   Deep tendon reflexes is normal, +2 in the upper and lower extremities bilaterally.   Muscle tone is normal.   Babinski is Negative bilaterally.   Sensory is intact to light touch and pinprick throughout x4 extremities.     X ray of Lumbar spine showed Narrowed L4 -- L5 disk space    MRI of lumbar spine showed:   Degenerative changes at L4-5, contributing to moderate right neural foraminal stenosis,    possibly impinging upon the exiting right L4 nerve root.  Moderate right-sided facet joint arthropathy noted at L4-5 and L5-S1.     Assessment:       1. Chronic low back pain with sciatica, sciatica laterality unspecified, unspecified back pain laterality    2. Muscle spasm    3. Lumbar radiculopathy    4. Intractable tension-type headache, unspecified chronicity pattern    5. Neck pain    6. Right lumbar radiculopathy    7. Chronic tension-type headache, not intractable    8. Sciatica of left side        Plan:       Chronic low back pain with sciatica, sciatica laterality unspecified, unspecified back pain laterality  -     butalbital-acetaminophen-caffeine -40 mg (FIORICET, ESGIC) -40 mg per tablet; Take 1 tablet by mouth every 6 (six) hours as needed for Pain or Headaches.  Dispense: 120 tablet; Refill: 2    Muscle spasm  -      butalbital-acetaminophen-caffeine -40 mg (FIORICET, ESGIC) -40 mg per tablet; Take 1 tablet by mouth every 6 (six) hours as needed for Pain or Headaches.  Dispense: 120 tablet; Refill: 2    Lumbar radiculopathy  -     butalbital-acetaminophen-caffeine -40 mg (FIORICET, ESGIC) -40 mg per tablet; Take 1 tablet by mouth every 6 (six) hours as needed for Pain or Headaches.  Dispense: 120 tablet; Refill: 2    Intractable tension-type headache, unspecified chronicity pattern  -     butalbital-acetaminophen-caffeine -40 mg (FIORICET, ESGIC) -40 mg per tablet; Take 1 tablet by mouth every 6 (six) hours as needed for Pain or Headaches.  Dispense: 120 tablet; Refill: 2    Neck pain  -     butalbital-acetaminophen-caffeine -40 mg (FIORICET, ESGIC) -40 mg per tablet; Take 1 tablet by mouth every 6 (six) hours as needed for Pain or Headaches.  Dispense: 120 tablet; Refill: 2    Right lumbar radiculopathy  -     butalbital-acetaminophen-caffeine -40 mg (FIORICET, ESGIC) -40 mg per tablet; Take 1 tablet by mouth every 6 (six) hours as needed for Pain or Headaches.  Dispense: 120 tablet; Refill: 2    Chronic tension-type headache, not intractable  -     butalbital-acetaminophen-caffeine -40 mg (FIORICET, ESGIC) -40 mg per tablet; Take 1 tablet by mouth every 6 (six) hours as needed for Pain or Headaches.  Dispense: 120 tablet; Refill: 2    Sciatica of left side  -     butalbital-acetaminophen-caffeine -40 mg (FIORICET, ESGIC) -40 mg per tablet; Take 1 tablet by mouth every 6 (six) hours as needed for Pain or Headaches.  Dispense: 120 tablet; Refill: 2      Patient with chronic back pain secondary to DJD, DDD of Lumbar spine, with possible Right L 4-5 radiculopathy.   Also with chronic neck pain, secondary to cervical spondylosis.     1. Pain management :   Resume  Neurontin, 300 mg one at bedtime for neuropathic pain.    He takes Fioricet and  Gabapentin  for HA, and all MSK pains.   Resume Fioricet that he was taking for HA, but helps best with back pain,and does not make him sleepy.    Stopped Elavil, gives him hangover.     Prescriptions for 3 months given in hand.     Follow up in 3 months.     Total time spent face to face with patient was 15 minutes.   More than 50% of that time was spent in counseling on diagnosis , prognosis and treatment options.   I also caunsel patient  on common and most usual side effect of prescribed medications.  I reviewed Primary care , and other specialty's notes to better coordinate patient's  care.   All questions were answered, and patient voiced understanding.

## 2017-09-30 RX ORDER — ATENOLOL 50 MG/1
TABLET ORAL
Qty: 60 TABLET | Refills: 5 | Status: SHIPPED | OUTPATIENT
Start: 2017-09-30 | End: 2018-04-23 | Stop reason: SDUPTHER

## 2017-10-19 ENCOUNTER — OFFICE VISIT (OUTPATIENT)
Dept: PHYSICAL MEDICINE AND REHAB | Facility: CLINIC | Age: 47
End: 2017-10-19
Payer: MEDICAID

## 2017-10-19 VITALS
DIASTOLIC BLOOD PRESSURE: 64 MMHG | BODY MASS INDEX: 30.96 KG/M2 | HEART RATE: 52 BPM | SYSTOLIC BLOOD PRESSURE: 113 MMHG | WEIGHT: 209 LBS | HEIGHT: 69 IN

## 2017-10-19 DIAGNOSIS — M54.16 LUMBAR RADICULOPATHY: ICD-10-CM

## 2017-10-19 DIAGNOSIS — M54.41 CHRONIC BILATERAL LOW BACK PAIN WITH RIGHT-SIDED SCIATICA: ICD-10-CM

## 2017-10-19 DIAGNOSIS — G44.201 INTRACTABLE TENSION-TYPE HEADACHE, UNSPECIFIED CHRONICITY PATTERN: Primary | ICD-10-CM

## 2017-10-19 DIAGNOSIS — M54.16 BILATERAL LUMBAR RADICULOPATHY: ICD-10-CM

## 2017-10-19 DIAGNOSIS — M54.12 LEFT CERVICAL RADICULOPATHY: ICD-10-CM

## 2017-10-19 DIAGNOSIS — M54.2 NECK PAIN: ICD-10-CM

## 2017-10-19 DIAGNOSIS — M54.16 RIGHT LUMBAR RADICULOPATHY: ICD-10-CM

## 2017-10-19 DIAGNOSIS — G89.29 CHRONIC BILATERAL LOW BACK PAIN WITH RIGHT-SIDED SCIATICA: ICD-10-CM

## 2017-10-19 DIAGNOSIS — G89.29 CHRONIC LOW BACK PAIN WITH SCIATICA, SCIATICA LATERALITY UNSPECIFIED, UNSPECIFIED BACK PAIN LATERALITY: ICD-10-CM

## 2017-10-19 DIAGNOSIS — M54.32 SCIATICA OF LEFT SIDE: ICD-10-CM

## 2017-10-19 DIAGNOSIS — M62.838 MUSCLE SPASM: ICD-10-CM

## 2017-10-19 DIAGNOSIS — M54.30 SCIATICA, UNSPECIFIED LATERALITY: ICD-10-CM

## 2017-10-19 DIAGNOSIS — M54.40 CHRONIC LOW BACK PAIN WITH SCIATICA, SCIATICA LATERALITY UNSPECIFIED, UNSPECIFIED BACK PAIN LATERALITY: ICD-10-CM

## 2017-10-19 DIAGNOSIS — G44.229 CHRONIC TENSION-TYPE HEADACHE, NOT INTRACTABLE: ICD-10-CM

## 2017-10-19 PROCEDURE — 99213 OFFICE O/P EST LOW 20 MIN: CPT | Mod: PBBFAC | Performed by: PHYSICAL MEDICINE & REHABILITATION

## 2017-10-19 PROCEDURE — 99213 OFFICE O/P EST LOW 20 MIN: CPT | Mod: S$PBB,,, | Performed by: PHYSICAL MEDICINE & REHABILITATION

## 2017-10-19 PROCEDURE — 99999 PR PBB SHADOW E&M-EST. PATIENT-LVL III: CPT | Mod: PBBFAC,,, | Performed by: PHYSICAL MEDICINE & REHABILITATION

## 2017-10-19 RX ORDER — BUTALBITAL, ACETAMINOPHEN AND CAFFEINE 50; 325; 40 MG/1; MG/1; MG/1
1 TABLET ORAL EVERY 6 HOURS PRN
Qty: 120 TABLET | Refills: 3 | Status: SHIPPED | OUTPATIENT
Start: 2017-10-19 | End: 2018-02-19 | Stop reason: SDUPTHER

## 2017-10-19 RX ORDER — NAPROXEN SODIUM 550 MG/1
550 TABLET ORAL 2 TIMES DAILY WITH MEALS
Qty: 180 TABLET | Refills: 1 | Status: SHIPPED | OUTPATIENT
Start: 2017-10-19 | End: 2018-10-12

## 2017-10-19 NOTE — PROGRESS NOTES
Subjective:       Patient ID: Linh Schmidt is a 47 y.o. male.    Chief Complaint: Leg Pain (R leg) and Back Pain (lower back)    Back Pain   Associated symptoms include headaches and leg pain. Pertinent negatives include no abdominal pain, chest pain or fever.   Migraine    Associated symptoms include back pain and neck pain. Pertinent negatives include no abdominal pain, dizziness, eye pain or fever.   Leg Pain      Neck Pain    Associated symptoms include headaches and leg pain. Pertinent negatives include no chest pain, fever or trouble swallowing.   Headache    Associated symptoms include back pain and neck pain. Pertinent negatives include no abdominal pain, dizziness, eye pain or fever.   Mr. Schmidt returns to clinic for chronic back, and neck pain.  LCV 07/18/17.   He reports that his back pain is well controled, and headaches with current pain management, Fioricet,   That he is taking quite a long with no increase in dose.  Today he complains about neck and back pain.     #1 is in back and left leg pain.   Current back and leg pain is 3, and worst pain is 6.   Back pain radiates only to right leg pain.  Back pain and right leg pain is constant every day pain , with changes in intensity,   becomes severe once a month.   Back pain is described as early morning stiffness, dull ache, soreness like pain, that goes down to right leg, as numbness.   Bending forward increases, and changes pain to shooting, stinging  pain in right leg, down the right buttock,   back of thigh, and calf  (does not go to foot/ heel), rarely feels as electric shock.  Bending forward, activities, increase the pain, lying on hard surface improves the pain.   No limitation in walking, prolong sitting increases the pain.    # 2 neck pain, improved to resolved.  Current neck pain is 0/10, worst pain is 1-2/10.   No neck pain radiation to sides to shoulders/arms.    Neck pain is off/on pain, mainly during day time, and neck feels stiff,  "jeffrey. early in morning.   Takes Fioricet ( takes 3-4 tabs/days) and Neurontin in evening helps.      #3 Headache, getting more frequent , maybe 3x / week.   But does not take Elavil regularly.  Takes Fioricet 2 tabs BID, for HA pain.   For the breakthrough he takes Ibuprofen or aspirin, that helps.   Does not take Imitrex.   Takes Gabapentin, one in am,and 2 tabs at bedtime,  and Fioricet 1-2 tablet /day on good day,   and 3- 4 /day for the most severe pain.  Exercises regularly , goes to gym 5 x /week, in past he was picking up heavy weight up 340 lbs,   squating, stop " squats  2 yrs ago (because of back/ leg pain).   Now doing only bench press ~ 100 llbs, 3-4 x/week.   Here [facility] follow up and medications adjustment.    Past Medical History:   Diagnosis Date    Anxiety     GERD (gastroesophageal reflux disease)     Hypertension     Osteoarthritis        Past Surgical History:   Procedure Laterality Date    dental implants         Family History   Problem Relation Age of Onset    Mental illness Mother     Diabetes Maternal Grandmother     Heart disease Maternal Grandfather     Hyperlipidemia Maternal Grandfather     Stroke Maternal Grandfather        Social History     Social History    Marital status:      Spouse name: N/A    Number of children: N/A    Years of education: N/A     Social History Main Topics    Smoking status: Former Smoker    Smokeless tobacco: Never Used    Alcohol use No    Drug use: No    Sexual activity: Yes     Partners: Female     Other Topics Concern    Patient Feels They Ought To Cut Down On Drinking/Drug Use No    Patient Annoyed By Others Criticizing Their Drinking/Drug Use No    Patient Has Felt Bad Or Guilty About Drinking/Drug Use No    Patient Has Had A Drink/Used Drugs As An Eye Opener In The Am No     Social History Narrative    None       Current Outpatient Prescriptions   Medication Sig Dispense Refill    atenolol (TENORMIN) 50 MG tablet TAKE ONE " TABLET BY MOUTH TWICE DAILY 60 tablet 5    butalbital-acetaminophen-caffeine -40 mg (FIORICET, ESGIC) -40 mg per tablet Take 1 tablet by mouth every 6 (six) hours as needed for Pain or Headaches. 120 tablet 3    gabapentin (NEURONTIN) 300 MG capsule Take 2 capsules (600 mg total) by mouth every evening. 60 capsule 11    naproxen sodium (ANAPROX) 550 MG tablet Take 1 tablet (550 mg total) by mouth 2 (two) times daily with meals. 180 tablet 1    omeprazole (PRILOSEC) 20 MG capsule Take 20 mg by mouth once daily.      sertraline (ZOLOFT) 50 MG tablet Take 50 mg by mouth every 8 (eight) hours.       No current facility-administered medications for this visit.        Review of patient's allergies indicates:  No Known Allergies    Review of Systems   Constitutional: Negative for activity change, appetite change, chills, diaphoresis, fatigue, fever and unexpected weight change.   HENT: Negative for trouble swallowing and voice change.    Eyes: Negative for pain and visual disturbance.   Respiratory: Negative for chest tightness, shortness of breath and wheezing.    Cardiovascular: Negative for chest pain, palpitations and leg swelling.   Gastrointestinal: Negative for abdominal pain, constipation and diarrhea.   Genitourinary: Negative for difficulty urinating, frequency and urgency.   Musculoskeletal: Positive for back pain and neck pain. Negative for arthralgias, joint swelling, myalgias and neck stiffness.   Skin: Negative for rash and wound.   Neurological: Positive for headaches. Negative for dizziness, facial asymmetry, speech difficulty and light-headedness.   Hematological: Negative for adenopathy.   Psychiatric/Behavioral: Negative for agitation, behavioral problems, confusion, decreased concentration, dysphoric mood and sleep disturbance.       Objective:      Physical Exam    GENERAL: The patient is alert, oriented, pleasant.   PHYSICAL EXAM:  GENERAL: The patient is alert, oriented, pleasant.    HEENT: PERRLA  NECK: supple, no masses, JVP normal.  CV: S1S2, RRR, no murmurs, rales.  LUNGS: CTA bilateral.   ABDOMEN: soft, non-tender, non distended, bowel sounds nl.  EXTREMITIES: no edema, +2 pulses DP, b/l  SKIN: no skin rash, no skin breakdowns.  MUSCULOSKELETAL:   Gait is normal limits.   Cervical spine full range of motion in all planes.   Lumbar spine, full range of motion in all planes, flexion 90 degrees,    Side bending and rotation to 20-25 degrees.  Straight leg raising Positive on Right .   Full range of motion in all joints x4 extremities.   Muscle strength 5/5 throughout x4 extremities.   No  joint laxity throughout x4 extremities.   NEUROLOGIC: Cranial nerves II through XII intact.   Deep tendon reflexes is normal, +2 in the upper and lower extremities bilaterally.   Muscle tone is normal.   Babinski is Negative bilaterally.   Sensory is intact to light touch and pinprick throughout x4 extremities.     X ray of Lumbar spine showed Narrowed L4 -- L5 disk space    MRI of lumbar spine showed:   Degenerative changes at L4-5, contributing to moderate right neural foraminal stenosis,    possibly impinging upon the exiting right L4 nerve root.  Moderate right-sided facet joint arthropathy noted at L4-5 and L5-S1.     Assessment:       1. Intractable tension-type headache, unspecified chronicity pattern    2. Chronic low back pain with sciatica, sciatica laterality unspecified, unspecified back pain laterality    3. Lumbar radiculopathy    4. Muscle spasm    5. Neck pain    6. Right lumbar radiculopathy    7. Chronic tension-type headache, not intractable    8. Sciatica of left side    9. Bilateral lumbar radiculopathy    10. Chronic bilateral low back pain with right-sided sciatica    11. Sciatica, unspecified laterality    12. Left cervical radiculopathy        Plan:       Intractable tension-type headache, unspecified chronicity pattern  -     butalbital-acetaminophen-caffeine -40 mg  (FIORICET, ESGIC) -40 mg per tablet; Take 1 tablet by mouth every 6 (six) hours as needed for Pain or Headaches.  Dispense: 120 tablet; Refill: 3  -     naproxen sodium (ANAPROX) 550 MG tablet; Take 1 tablet (550 mg total) by mouth 2 (two) times daily with meals.  Dispense: 180 tablet; Refill: 1    Chronic low back pain with sciatica, sciatica laterality unspecified, unspecified back pain laterality  -     butalbital-acetaminophen-caffeine -40 mg (FIORICET, ESGIC) -40 mg per tablet; Take 1 tablet by mouth every 6 (six) hours as needed for Pain or Headaches.  Dispense: 120 tablet; Refill: 3  -     naproxen sodium (ANAPROX) 550 MG tablet; Take 1 tablet (550 mg total) by mouth 2 (two) times daily with meals.  Dispense: 180 tablet; Refill: 1    Lumbar radiculopathy  -     butalbital-acetaminophen-caffeine -40 mg (FIORICET, ESGIC) -40 mg per tablet; Take 1 tablet by mouth every 6 (six) hours as needed for Pain or Headaches.  Dispense: 120 tablet; Refill: 3  -     naproxen sodium (ANAPROX) 550 MG tablet; Take 1 tablet (550 mg total) by mouth 2 (two) times daily with meals.  Dispense: 180 tablet; Refill: 1    Muscle spasm  -     butalbital-acetaminophen-caffeine -40 mg (FIORICET, ESGIC) -40 mg per tablet; Take 1 tablet by mouth every 6 (six) hours as needed for Pain or Headaches.  Dispense: 120 tablet; Refill: 3  -     naproxen sodium (ANAPROX) 550 MG tablet; Take 1 tablet (550 mg total) by mouth 2 (two) times daily with meals.  Dispense: 180 tablet; Refill: 1    Neck pain  -     butalbital-acetaminophen-caffeine -40 mg (FIORICET, ESGIC) -40 mg per tablet; Take 1 tablet by mouth every 6 (six) hours as needed for Pain or Headaches.  Dispense: 120 tablet; Refill: 3  -     naproxen sodium (ANAPROX) 550 MG tablet; Take 1 tablet (550 mg total) by mouth 2 (two) times daily with meals.  Dispense: 180 tablet; Refill: 1    Right lumbar radiculopathy  -      butalbital-acetaminophen-caffeine -40 mg (FIORICET, ESGIC) -40 mg per tablet; Take 1 tablet by mouth every 6 (six) hours as needed for Pain or Headaches.  Dispense: 120 tablet; Refill: 3  -     naproxen sodium (ANAPROX) 550 MG tablet; Take 1 tablet (550 mg total) by mouth 2 (two) times daily with meals.  Dispense: 180 tablet; Refill: 1    Chronic tension-type headache, not intractable  -     butalbital-acetaminophen-caffeine -40 mg (FIORICET, ESGIC) -40 mg per tablet; Take 1 tablet by mouth every 6 (six) hours as needed for Pain or Headaches.  Dispense: 120 tablet; Refill: 3  -     naproxen sodium (ANAPROX) 550 MG tablet; Take 1 tablet (550 mg total) by mouth 2 (two) times daily with meals.  Dispense: 180 tablet; Refill: 1    Sciatica of left side  -     butalbital-acetaminophen-caffeine -40 mg (FIORICET, ESGIC) -40 mg per tablet; Take 1 tablet by mouth every 6 (six) hours as needed for Pain or Headaches.  Dispense: 120 tablet; Refill: 3  -     naproxen sodium (ANAPROX) 550 MG tablet; Take 1 tablet (550 mg total) by mouth 2 (two) times daily with meals.  Dispense: 180 tablet; Refill: 1    Bilateral lumbar radiculopathy  -     naproxen sodium (ANAPROX) 550 MG tablet; Take 1 tablet (550 mg total) by mouth 2 (two) times daily with meals.  Dispense: 180 tablet; Refill: 1    Chronic bilateral low back pain with right-sided sciatica  -     naproxen sodium (ANAPROX) 550 MG tablet; Take 1 tablet (550 mg total) by mouth 2 (two) times daily with meals.  Dispense: 180 tablet; Refill: 1    Sciatica, unspecified laterality  -     naproxen sodium (ANAPROX) 550 MG tablet; Take 1 tablet (550 mg total) by mouth 2 (two) times daily with meals.  Dispense: 180 tablet; Refill: 1    Left cervical radiculopathy  -     naproxen sodium (ANAPROX) 550 MG tablet; Take 1 tablet (550 mg total) by mouth 2 (two) times daily with meals.  Dispense: 180 tablet; Refill: 1      Patient with chronic back pain  secondary to DJD, DDD of Lumbar spine, with possible Right L 4-5 radiculopathy.    he also has a chronic neck pain, secondary to cervical spondylosis, that is improved to resolved.    1. Pain management :   Resume  Neurontin, 300 mg one at bedtime for neuropathic pain.    He takes Fioricet and Gabapentin  for HA, and all MSK pains.   Resume Fioricet that he was taking for HA, but helps best with back pain,and does not make him sleepy.    Stopped Elavil, gives him hangover.     Prescriptions for 3 months given in hand.     Follow up in 4 months.     Total time spent face to face with patient was 15 minutes.   More than 50% of that time was spent in counseling on diagnosis , prognosis and treatment options.   I also caunsel patient  on common and most usual side effect of prescribed medications.  I reviewed Primary care , and other specialty's notes to better coordinate patient's  care.   All questions were answered, and patient voiced understanding.

## 2018-01-26 RX ORDER — TADALAFIL 5 MG/1
TABLET, FILM COATED ORAL
Qty: 6 TABLET | Refills: 5 | Status: SHIPPED | OUTPATIENT
Start: 2018-01-26 | End: 2018-10-12

## 2018-02-19 ENCOUNTER — OFFICE VISIT (OUTPATIENT)
Dept: PHYSICAL MEDICINE AND REHAB | Facility: CLINIC | Age: 48
End: 2018-02-19
Payer: MEDICAID

## 2018-02-19 VITALS
HEART RATE: 82 BPM | SYSTOLIC BLOOD PRESSURE: 145 MMHG | WEIGHT: 194.13 LBS | DIASTOLIC BLOOD PRESSURE: 83 MMHG | HEIGHT: 69 IN | BODY MASS INDEX: 28.75 KG/M2

## 2018-02-19 DIAGNOSIS — G44.209 TENSION-TYPE HEADACHE, NOT INTRACTABLE, UNSPECIFIED CHRONICITY PATTERN: ICD-10-CM

## 2018-02-19 DIAGNOSIS — G89.29 CHRONIC LOW BACK PAIN WITH SCIATICA, SCIATICA LATERALITY UNSPECIFIED, UNSPECIFIED BACK PAIN LATERALITY: Primary | ICD-10-CM

## 2018-02-19 DIAGNOSIS — M54.40 CHRONIC LOW BACK PAIN WITH SCIATICA, SCIATICA LATERALITY UNSPECIFIED, UNSPECIFIED BACK PAIN LATERALITY: Primary | ICD-10-CM

## 2018-02-19 DIAGNOSIS — M54.2 NECK PAIN: ICD-10-CM

## 2018-02-19 DIAGNOSIS — M54.32 SCIATICA OF LEFT SIDE: ICD-10-CM

## 2018-02-19 DIAGNOSIS — M54.16 RIGHT LUMBAR RADICULOPATHY: ICD-10-CM

## 2018-02-19 DIAGNOSIS — M62.838 MUSCLE SPASM: ICD-10-CM

## 2018-02-19 DIAGNOSIS — M54.16 LUMBAR RADICULOPATHY: ICD-10-CM

## 2018-02-19 DIAGNOSIS — G44.229 CHRONIC TENSION-TYPE HEADACHE, NOT INTRACTABLE: ICD-10-CM

## 2018-02-19 DIAGNOSIS — G44.201 INTRACTABLE TENSION-TYPE HEADACHE, UNSPECIFIED CHRONICITY PATTERN: ICD-10-CM

## 2018-02-19 PROCEDURE — 99212 OFFICE O/P EST SF 10 MIN: CPT | Mod: PBBFAC | Performed by: PHYSICAL MEDICINE & REHABILITATION

## 2018-02-19 PROCEDURE — 99999 PR PBB SHADOW E&M-EST. PATIENT-LVL II: CPT | Mod: PBBFAC,,, | Performed by: PHYSICAL MEDICINE & REHABILITATION

## 2018-02-19 PROCEDURE — 99213 OFFICE O/P EST LOW 20 MIN: CPT | Mod: S$PBB,,, | Performed by: PHYSICAL MEDICINE & REHABILITATION

## 2018-02-19 PROCEDURE — 3008F BODY MASS INDEX DOCD: CPT | Mod: ,,, | Performed by: PHYSICAL MEDICINE & REHABILITATION

## 2018-02-19 RX ORDER — BUTALBITAL, ACETAMINOPHEN AND CAFFEINE 50; 325; 40 MG/1; MG/1; MG/1
1 TABLET ORAL EVERY 6 HOURS PRN
Qty: 120 TABLET | Refills: 3 | Status: SHIPPED | OUTPATIENT
Start: 2018-02-19 | End: 2018-05-14 | Stop reason: SDUPTHER

## 2018-02-19 NOTE — PROGRESS NOTES
Subjective:       Patient ID: Linh Schmidt is a 47 y.o. male.    Chief Complaint: Back Pain and Headache    Leg Pain      Back Pain   Associated symptoms include headaches and leg pain. Pertinent negatives include no abdominal pain, chest pain or fever.   Migraine    Associated symptoms include back pain and neck pain. Pertinent negatives include no abdominal pain, dizziness, eye pain or fever.   Neck Pain    Associated symptoms include headaches and leg pain. Pertinent negatives include no chest pain, fever or trouble swallowing.   Headache    Associated symptoms include back pain and neck pain. Pertinent negatives include no abdominal pain, dizziness, eye pain or fever.   Mr. Schmidt returns to clinic for chronic back, and neck pain.  LCV 10/19/17.   He reports that his back pain is well controled, and headaches with current pain management, Fioricet,   That he is taking quite a long with no increase in dose.  Today he complains about back, neck  Pain, and headache.    #1 is in back and left leg pain.   Current back and leg pain is 2, and worst pain is 6.   Back pain radiates only to right leg pain.  Back pain and right leg pain is constant every day pain , with changes in intensity,   becomes severe once a month.   Back pain is described as early morning stiffness, dull ache, soreness like pain, that goes down to right leg, as numbness.   Bending forward increases, and changes pain to shooting, stinging  pain in right leg, down the right buttock,   back of thigh, and calf  (does not go to foot/ heel), rarely feels as electric shock.  Bending forward, activities, increase the pain, lying on hard surface improves the pain.   No limitation in walking, prolong sitting increases the pain.    # 2 neck pain, improved to resolved.  Current neck pain is 0/10, worst pain is 1-2/10.   No neck pain radiation to sides to shoulders/arms.    Neck pain is off/on pain, mainly during day time, and neck feels stiff, jeffrey. early in  "morning.   Takes Fioricet ( takes 3-4 tabs/days) and Neurontin in evening helps.      #3 Headache,   Today, has a headache.   Now getting more frequent , maybe 3x / week.   But does not take Elavil regularly.  Takes Fioricet 2 tabs BID, for HA pain.   For the breakthrough he takes Ibuprofen or aspirin, that helps.   Does not take Imitrex.   Takes Gabapentin, one tabs at bedtime irregularly,  and Fioricet  1-2 tabs/ /day on good day,   and 3- 4 /day for the most severe pain. Lately he was taking 4 tabs/day,s econdary to cold weather, he had more pain.   Exercises regularly , goes to gym 5 x /week, in past he was picking up heavy weight up 340 lbs,   squating, stop " squats  2 yrs ago (because of back/ leg pain).   Now doing only bench press ~ 100 llbs, 3-4 x/week.   Here alessandro follow up and chronic pain management.     Past Medical History:   Diagnosis Date    Anxiety     GERD (gastroesophageal reflux disease)     Hypertension     Osteoarthritis        Past Surgical History:   Procedure Laterality Date    dental implants         Family History   Problem Relation Age of Onset    Mental illness Mother     Diabetes Maternal Grandmother     Heart disease Maternal Grandfather     Hyperlipidemia Maternal Grandfather     Stroke Maternal Grandfather        Social History     Social History    Marital status:      Spouse name: N/A    Number of children: N/A    Years of education: N/A     Social History Main Topics    Smoking status: Former Smoker    Smokeless tobacco: Never Used    Alcohol use No    Drug use: No    Sexual activity: Yes     Partners: Female     Other Topics Concern    Patient Feels They Ought To Cut Down On Drinking/Drug Use No    Patient Annoyed By Others Criticizing Their Drinking/Drug Use No    Patient Has Felt Bad Or Guilty About Drinking/Drug Use No    Patient Has Had A Drink/Used Drugs As An Eye Opener In The Am No     Social History Narrative    No narrative on file "       Current Outpatient Prescriptions   Medication Sig Dispense Refill    atenolol (TENORMIN) 50 MG tablet TAKE ONE TABLET BY MOUTH TWICE DAILY 60 tablet 5    butalbital-acetaminophen-caffeine -40 mg (FIORICET, ESGIC) -40 mg per tablet Take 1 tablet by mouth every 6 (six) hours as needed for Pain or Headaches. 120 tablet 3    CIALIS 5 mg tablet TAKE ONE TABLET BY MOUTH EVERY 3 DAYS 6 tablet 5    gabapentin (NEURONTIN) 300 MG capsule Take 2 capsules (600 mg total) by mouth every evening. 60 capsule 11    naproxen sodium (ANAPROX) 550 MG tablet Take 1 tablet (550 mg total) by mouth 2 (two) times daily with meals. 180 tablet 1    omeprazole (PRILOSEC) 20 MG capsule Take 20 mg by mouth once daily.      sertraline (ZOLOFT) 50 MG tablet Take 50 mg by mouth every 8 (eight) hours.       No current facility-administered medications for this visit.        Review of patient's allergies indicates:  No Known Allergies    Review of Systems   Constitutional: Negative for activity change, appetite change, chills, diaphoresis, fatigue, fever and unexpected weight change.   HENT: Negative for trouble swallowing and voice change.    Eyes: Negative for pain and visual disturbance.   Respiratory: Negative for chest tightness, shortness of breath and wheezing.    Cardiovascular: Negative for chest pain, palpitations and leg swelling.   Gastrointestinal: Negative for abdominal pain, constipation and diarrhea.   Genitourinary: Negative for difficulty urinating, frequency and urgency.   Musculoskeletal: Positive for back pain and neck pain. Negative for arthralgias, joint swelling, myalgias and neck stiffness.   Skin: Negative for rash and wound.   Neurological: Positive for headaches. Negative for dizziness, facial asymmetry, speech difficulty and light-headedness.   Hematological: Negative for adenopathy.   Psychiatric/Behavioral: Negative for agitation, behavioral problems, confusion, decreased concentration,  dysphoric mood and sleep disturbance.       Objective:      Physical Exam    GENERAL: The patient is alert, oriented, pleasant.   PHYSICAL EXAM:  GENERAL: The patient is alert, oriented, pleasant.   HEENT: PERRLA  NECK: supple, no masses, JVP normal.  CV: S1S2, RRR, no murmurs, rales.  LUNGS: CTA bilateral.   ABDOMEN: soft, non-tender, non distended, bowel sounds nl.  EXTREMITIES: no edema, +2 pulses DP, b/l  SKIN: no skin rash, no skin breakdowns.  MUSCULOSKELETAL:   Gait is normal limits.   Cervical spine full range of motion in all planes.   Lumbar spine, full range of motion in all planes, flexion 90 degrees,    Side bending and rotation to 20-25 degrees.  Straight leg raising Positive on Right .   Full range of motion in all joints x4 extremities.   Muscle strength 5/5 throughout x4 extremities.   No  joint laxity throughout x4 extremities.   NEUROLOGIC: Cranial nerves II through XII intact.   Deep tendon reflexes is normal, +2 in the upper and lower extremities bilaterally.   Muscle tone is normal.   Babinski is Negative bilaterally.   Sensory is intact to light touch and pinprick throughout x4 extremities.     X ray of Lumbar spine showed Narrowed L4 -- L5 disk space    MRI of lumbar spine showed:   Degenerative changes at L4-5, contributing to moderate right neural foraminal stenosis,    possibly impinging upon the exiting right L4 nerve root.  Moderate right-sided facet joint arthropathy noted at L4-5 and L5-S1.     Assessment:       1. Chronic low back pain with sciatica, sciatica laterality unspecified, unspecified back pain laterality    2. Lumbar radiculopathy    3. Muscle spasm    4. Tension-type headache, not intractable, unspecified chronicity pattern    5. Right lumbar radiculopathy    6. Neck pain    7. Chronic tension-type headache, not intractable    8. Sciatica of left side    9. Intractable tension-type headache, unspecified chronicity pattern        Plan:       Chronic low back pain with  sciatica, sciatica laterality unspecified, unspecified back pain laterality  -     butalbital-acetaminophen-caffeine -40 mg (FIORICET, ESGIC) -40 mg per tablet; Take 1 tablet by mouth every 6 (six) hours as needed for Pain or Headaches.  Dispense: 120 tablet; Refill: 3    Lumbar radiculopathy  -     butalbital-acetaminophen-caffeine -40 mg (FIORICET, ESGIC) -40 mg per tablet; Take 1 tablet by mouth every 6 (six) hours as needed for Pain or Headaches.  Dispense: 120 tablet; Refill: 3    Muscle spasm  -     butalbital-acetaminophen-caffeine -40 mg (FIORICET, ESGIC) -40 mg per tablet; Take 1 tablet by mouth every 6 (six) hours as needed for Pain or Headaches.  Dispense: 120 tablet; Refill: 3    Tension-type headache, not intractable, unspecified chronicity pattern  -     butalbital-acetaminophen-caffeine -40 mg (FIORICET, ESGIC) -40 mg per tablet; Take 1 tablet by mouth every 6 (six) hours as needed for Pain or Headaches.  Dispense: 120 tablet; Refill: 3    Right lumbar radiculopathy  -     butalbital-acetaminophen-caffeine -40 mg (FIORICET, ESGIC) -40 mg per tablet; Take 1 tablet by mouth every 6 (six) hours as needed for Pain or Headaches.  Dispense: 120 tablet; Refill: 3    Neck pain  -     butalbital-acetaminophen-caffeine -40 mg (FIORICET, ESGIC) -40 mg per tablet; Take 1 tablet by mouth every 6 (six) hours as needed for Pain or Headaches.  Dispense: 120 tablet; Refill: 3    Chronic tension-type headache, not intractable  -     butalbital-acetaminophen-caffeine -40 mg (FIORICET, ESGIC) -40 mg per tablet; Take 1 tablet by mouth every 6 (six) hours as needed for Pain or Headaches.  Dispense: 120 tablet; Refill: 3    Sciatica of left side  -     butalbital-acetaminophen-caffeine -40 mg (FIORICET, ESGIC) -40 mg per tablet; Take 1 tablet by mouth every 6 (six) hours as needed for Pain or Headaches.  Dispense: 120 tablet;  Refill: 3    Intractable tension-type headache, unspecified chronicity pattern  -     butalbital-acetaminophen-caffeine -40 mg (FIORICET, ESGIC) -40 mg per tablet; Take 1 tablet by mouth every 6 (six) hours as needed for Pain or Headaches.  Dispense: 120 tablet; Refill: 3      Patient with chronic back pain secondary to DJD, DDD of Lumbar spine, with possible Right L 4-5 radiculopathy.    he also has a chronic neck pain, secondary to cervical spondylosis, that is improved to resolved.    1. Pain management :   Resume  Neurontin, 300 mg one at bedtime for neuropathic pain ( recommended but does not take regularly.   He takes Fioricet  3-4 tabs/dy  for HA, and all MSK pains.   Resume Fioricet that he was taking for HA, but helps best with back pain,and does not make him sleepy. Stopped Elavil, gives him hangover.     Prescriptions for 4 months given in hand.     Follow up in 4 months.     Total time spent face to face with patient was 15 minutes.   More than 50% of that time was spent in counseling on diagnosis , prognosis and treatment options.   I also caunsel patient  on common and most usual side effect of prescribed medications.  I reviewed Primary care , and other specialty's notes to better coordinate patient's  care.   All questions were answered, and patient voiced understanding.

## 2018-04-24 RX ORDER — ATENOLOL 50 MG/1
TABLET ORAL
Qty: 60 TABLET | Refills: 0 | Status: SHIPPED | OUTPATIENT
Start: 2018-04-24 | End: 2018-06-26 | Stop reason: SDUPTHER

## 2018-05-14 DIAGNOSIS — M62.838 MUSCLE SPASM: ICD-10-CM

## 2018-05-14 DIAGNOSIS — M54.2 NECK PAIN: ICD-10-CM

## 2018-05-14 DIAGNOSIS — M54.40 CHRONIC LOW BACK PAIN WITH SCIATICA, SCIATICA LATERALITY UNSPECIFIED, UNSPECIFIED BACK PAIN LATERALITY: ICD-10-CM

## 2018-05-14 DIAGNOSIS — G44.209 TENSION-TYPE HEADACHE, NOT INTRACTABLE, UNSPECIFIED CHRONICITY PATTERN: ICD-10-CM

## 2018-05-14 DIAGNOSIS — G44.229 CHRONIC TENSION-TYPE HEADACHE, NOT INTRACTABLE: ICD-10-CM

## 2018-05-14 DIAGNOSIS — M54.32 SCIATICA OF LEFT SIDE: ICD-10-CM

## 2018-05-14 DIAGNOSIS — G44.201 INTRACTABLE TENSION-TYPE HEADACHE, UNSPECIFIED CHRONICITY PATTERN: ICD-10-CM

## 2018-05-14 DIAGNOSIS — M54.16 LUMBAR RADICULOPATHY: ICD-10-CM

## 2018-05-14 DIAGNOSIS — M54.16 RIGHT LUMBAR RADICULOPATHY: ICD-10-CM

## 2018-05-14 DIAGNOSIS — G89.29 CHRONIC LOW BACK PAIN WITH SCIATICA, SCIATICA LATERALITY UNSPECIFIED, UNSPECIFIED BACK PAIN LATERALITY: ICD-10-CM

## 2018-05-14 NOTE — TELEPHONE ENCOUNTER
----- Message from Zach Mcleod sent at 5/14/2018  4:18 PM CDT -----  Contact: Self @ [phone]  Pt is asking for script a 3 month script for butalbital-acetaminophen-caffeine -40 mg (FIORICET, ESGIC) -40 mg per tablet b4 appt on 08/20/18. Pls call once script is ready.

## 2018-05-15 RX ORDER — BUTALBITAL, ACETAMINOPHEN AND CAFFEINE 50; 325; 40 MG/1; MG/1; MG/1
1 TABLET ORAL EVERY 6 HOURS PRN
Qty: 120 TABLET | Refills: 1 | Status: SHIPPED | OUTPATIENT
Start: 2018-05-15 | End: 2018-05-19 | Stop reason: SDUPTHER

## 2018-05-19 DIAGNOSIS — G44.201 INTRACTABLE TENSION-TYPE HEADACHE, UNSPECIFIED CHRONICITY PATTERN: ICD-10-CM

## 2018-05-19 DIAGNOSIS — G44.209 TENSION-TYPE HEADACHE, NOT INTRACTABLE, UNSPECIFIED CHRONICITY PATTERN: ICD-10-CM

## 2018-05-19 DIAGNOSIS — M54.40 CHRONIC LOW BACK PAIN WITH SCIATICA, SCIATICA LATERALITY UNSPECIFIED, UNSPECIFIED BACK PAIN LATERALITY: ICD-10-CM

## 2018-05-19 DIAGNOSIS — G89.29 CHRONIC LOW BACK PAIN WITH SCIATICA, SCIATICA LATERALITY UNSPECIFIED, UNSPECIFIED BACK PAIN LATERALITY: ICD-10-CM

## 2018-05-19 DIAGNOSIS — M54.2 NECK PAIN: ICD-10-CM

## 2018-05-19 DIAGNOSIS — M62.838 MUSCLE SPASM: ICD-10-CM

## 2018-05-19 DIAGNOSIS — G44.229 CHRONIC TENSION-TYPE HEADACHE, NOT INTRACTABLE: ICD-10-CM

## 2018-05-19 DIAGNOSIS — M54.32 SCIATICA OF LEFT SIDE: ICD-10-CM

## 2018-05-19 DIAGNOSIS — M54.16 RIGHT LUMBAR RADICULOPATHY: ICD-10-CM

## 2018-05-19 DIAGNOSIS — M54.16 LUMBAR RADICULOPATHY: ICD-10-CM

## 2018-05-20 RX ORDER — BUTALBITAL, ACETAMINOPHEN AND CAFFEINE 50; 325; 40 MG/1; MG/1; MG/1
TABLET ORAL
Qty: 120 TABLET | Refills: 3 | Status: SHIPPED | OUTPATIENT
Start: 2018-05-20 | End: 2018-11-12 | Stop reason: SDUPTHER

## 2018-06-26 RX ORDER — ATENOLOL 50 MG/1
TABLET ORAL
Qty: 60 TABLET | Refills: 0 | Status: SHIPPED | OUTPATIENT
Start: 2018-06-26 | End: 2018-07-25 | Stop reason: SDUPTHER

## 2018-07-25 RX ORDER — ATENOLOL 50 MG/1
TABLET ORAL
Qty: 60 TABLET | Refills: 0 | Status: SHIPPED | OUTPATIENT
Start: 2018-07-25 | End: 2018-09-04 | Stop reason: SDUPTHER

## 2018-09-04 RX ORDER — ATENOLOL 50 MG/1
TABLET ORAL
Qty: 60 TABLET | Refills: 0 | Status: SHIPPED | OUTPATIENT
Start: 2018-09-04 | End: 2018-10-09 | Stop reason: SDUPTHER

## 2018-10-10 RX ORDER — ATENOLOL 50 MG/1
TABLET ORAL
Qty: 60 TABLET | Refills: 0 | Status: SHIPPED | OUTPATIENT
Start: 2018-10-10 | End: 2018-10-12 | Stop reason: SDUPTHER

## 2018-10-12 ENCOUNTER — OFFICE VISIT (OUTPATIENT)
Dept: PRIMARY CARE CLINIC | Facility: CLINIC | Age: 48
End: 2018-10-12
Payer: MEDICAID

## 2018-10-12 VITALS
HEIGHT: 69 IN | WEIGHT: 190.5 LBS | BODY MASS INDEX: 28.22 KG/M2 | DIASTOLIC BLOOD PRESSURE: 97 MMHG | TEMPERATURE: 98 F | OXYGEN SATURATION: 98 % | HEART RATE: 73 BPM | RESPIRATION RATE: 18 BRPM | SYSTOLIC BLOOD PRESSURE: 174 MMHG

## 2018-10-12 DIAGNOSIS — I10 HYPERTENSION, UNSPECIFIED TYPE: Primary | ICD-10-CM

## 2018-10-12 DIAGNOSIS — F41.9 ANXIETY: ICD-10-CM

## 2018-10-12 DIAGNOSIS — Z23 NEED FOR PROPHYLACTIC VACCINATION AND INOCULATION AGAINST INFLUENZA: ICD-10-CM

## 2018-10-12 DIAGNOSIS — R53.1 GENERAL WEAKNESS: ICD-10-CM

## 2018-10-12 PROCEDURE — 99999 PR PBB SHADOW E&M-EST. PATIENT-LVL IV: CPT | Mod: PBBFAC,,, | Performed by: INTERNAL MEDICINE

## 2018-10-12 PROCEDURE — 90686 IIV4 VACC NO PRSV 0.5 ML IM: CPT | Mod: PBBFAC,PN

## 2018-10-12 PROCEDURE — 99214 OFFICE O/P EST MOD 30 MIN: CPT | Mod: PBBFAC,PN,25 | Performed by: INTERNAL MEDICINE

## 2018-10-12 PROCEDURE — 99213 OFFICE O/P EST LOW 20 MIN: CPT | Mod: S$PBB,,, | Performed by: INTERNAL MEDICINE

## 2018-10-12 RX ORDER — ESCITALOPRAM OXALATE 20 MG/1
20 TABLET ORAL DAILY
COMMUNITY
End: 2021-06-17 | Stop reason: CLARIF

## 2018-10-12 RX ORDER — ATENOLOL 50 MG/1
50 TABLET ORAL 2 TIMES DAILY
Qty: 60 TABLET | Refills: 5 | Status: SHIPPED | OUTPATIENT
Start: 2018-10-12 | End: 2018-12-09

## 2018-10-12 RX ORDER — TESTOSTERONE CYPIONATE 200 MG/ML
INJECTION, SOLUTION INTRAMUSCULAR
Refills: 1 | COMMUNITY
Start: 2018-08-25

## 2018-10-14 NOTE — PROGRESS NOTES
Subjective:       Patient ID: Linh Schmidt is a 48 y.o. male.    Chief Complaint: Extremity Weakness and Medication Refill    HPI  patient is here to refill his medication for high blood pressure and did not run out of medication also complained of generalized weakness and anxiety no short of breath or chest pain no headaches the no dyspnea with exertion patient used to be on testosterone supplement IM in the past  Review of Systems    Objective:      Physical Exam   Constitutional: He is oriented to person, place, and time. He appears well-developed and well-nourished. No distress.   HENT:   Head: Normocephalic and atraumatic.   Right Ear: External ear normal.   Left Ear: External ear normal.   Nose: Nose normal.   Mouth/Throat: Oropharynx is clear and moist. No oropharyngeal exudate.   Eyes: Conjunctivae and EOM are normal. Pupils are equal, round, and reactive to light. Right eye exhibits no discharge. Left eye exhibits no discharge.   Neck: Normal range of motion. Neck supple. No thyromegaly present.   Cardiovascular: Normal rate, regular rhythm, normal heart sounds and intact distal pulses. Exam reveals no gallop and no friction rub.   No murmur heard.  Pulmonary/Chest: Effort normal and breath sounds normal. No respiratory distress. He has no wheezes. He has no rales. He exhibits no tenderness.   Abdominal: Soft. Bowel sounds are normal. He exhibits no distension. There is no tenderness. There is no rebound and no guarding.   Musculoskeletal: Normal range of motion. He exhibits no edema, tenderness or deformity.   Lymphadenopathy:     He has no cervical adenopathy.   Neurological: He is alert and oriented to person, place, and time.   Skin: Skin is warm and dry. Capillary refill takes less than 2 seconds. No rash noted. No erythema.   Psychiatric: He has a normal mood and affect. Judgment and thought content normal.   Nursing note and vitals reviewed.      Assessment:       1. Hypertension, unspecified  type    2. Anxiety    3. Need for prophylactic vaccination and inoculation against influenza    4. General weakness        Plan:       Hypertension, unspecified type  Comments:  Not well control anxiety probably contributing to this his blood pressure or control in the past with current medication will monitor next week when in labs  Orders:  -     atenolol (TENORMIN) 50 MG tablet; Take 1 tablet (50 mg total) by mouth 2 (two) times daily.  Dispense: 60 tablet; Refill: 5  -     CBC auto differential; Future; Expected date: 10/14/2018  -     Comprehensive metabolic panel; Future; Expected date: 10/14/2018  -     Lipid panel; Future; Expected date: 10/14/2018  -     X-Ray Chest PA And Lateral; Future; Expected date: 10/14/2018  -     POCT URINE DIPSTICK WITHOUT MICROSCOPE  -     SCHEDULED EKG 12-LEAD (to Muse); Future    Anxiety  Comments:  Continue with Lexapro  Orders:  -     TSH; Future; Expected date: 10/14/2018  -     T4, free; Future; Expected date: 10/14/2018    Need for prophylactic vaccination and inoculation against influenza  -     Flu Vaccine - Quadrivalent (PF) (3 years & older)    General weakness  -     Testosterone; Future; Expected date: 10/14/2018

## 2018-11-08 RX ORDER — ATENOLOL 50 MG/1
TABLET ORAL
Qty: 60 TABLET | Refills: 0 | Status: SHIPPED | OUTPATIENT
Start: 2018-11-08 | End: 2018-11-12 | Stop reason: SDUPTHER

## 2018-11-12 ENCOUNTER — OFFICE VISIT (OUTPATIENT)
Dept: PRIMARY CARE CLINIC | Facility: CLINIC | Age: 48
End: 2018-11-12
Payer: MEDICAID

## 2018-11-12 VITALS
OXYGEN SATURATION: 98 % | RESPIRATION RATE: 18 BRPM | HEART RATE: 64 BPM | TEMPERATURE: 98 F | SYSTOLIC BLOOD PRESSURE: 131 MMHG | HEIGHT: 69 IN | WEIGHT: 190 LBS | DIASTOLIC BLOOD PRESSURE: 80 MMHG | BODY MASS INDEX: 28.14 KG/M2

## 2018-11-12 DIAGNOSIS — G44.229 CHRONIC TENSION-TYPE HEADACHE, NOT INTRACTABLE: Primary | ICD-10-CM

## 2018-11-12 DIAGNOSIS — I10 HYPERTENSION, UNSPECIFIED TYPE: ICD-10-CM

## 2018-11-12 DIAGNOSIS — M54.2 NECK PAIN: ICD-10-CM

## 2018-11-12 DIAGNOSIS — Z12.5 PROSTATE CANCER SCREENING: ICD-10-CM

## 2018-11-12 DIAGNOSIS — M54.32 SCIATICA OF LEFT SIDE: ICD-10-CM

## 2018-11-12 DIAGNOSIS — G44.209 TENSION-TYPE HEADACHE, NOT INTRACTABLE, UNSPECIFIED CHRONICITY PATTERN: ICD-10-CM

## 2018-11-12 DIAGNOSIS — M54.40 CHRONIC LOW BACK PAIN WITH SCIATICA, SCIATICA LATERALITY UNSPECIFIED, UNSPECIFIED BACK PAIN LATERALITY: ICD-10-CM

## 2018-11-12 DIAGNOSIS — G89.29 CHRONIC LOW BACK PAIN WITH SCIATICA, SCIATICA LATERALITY UNSPECIFIED, UNSPECIFIED BACK PAIN LATERALITY: ICD-10-CM

## 2018-11-12 DIAGNOSIS — M54.16 LUMBAR RADICULOPATHY: ICD-10-CM

## 2018-11-12 DIAGNOSIS — R79.89 LOW TESTOSTERONE IN MALE: ICD-10-CM

## 2018-11-12 PROCEDURE — 99213 OFFICE O/P EST LOW 20 MIN: CPT | Mod: S$PBB,,, | Performed by: INTERNAL MEDICINE

## 2018-11-12 PROCEDURE — 99214 OFFICE O/P EST MOD 30 MIN: CPT | Mod: PBBFAC,PN | Performed by: INTERNAL MEDICINE

## 2018-11-12 PROCEDURE — 99999 PR PBB SHADOW E&M-EST. PATIENT-LVL IV: CPT | Mod: PBBFAC,,, | Performed by: INTERNAL MEDICINE

## 2018-11-12 RX ORDER — BUTALBITAL, ACETAMINOPHEN AND CAFFEINE 50; 325; 40 MG/1; MG/1; MG/1
1 TABLET ORAL EVERY 6 HOURS PRN
Qty: 30 TABLET | Refills: 1 | Status: SHIPPED | OUTPATIENT
Start: 2018-11-12 | End: 2018-12-14 | Stop reason: SDUPTHER

## 2018-11-13 NOTE — PROGRESS NOTES
Subjective:       Patient ID: Linh Schmidt is a 48 y.o. male.    Chief Complaint: Medication Refill    HPI  Pt is doing well on testosterone supplement except c/o headache sometime no photophobiba no n/v no fever chill no sob cp still with chronic back pain   Review of Systems    Objective:      Physical Exam   Constitutional: He is oriented to person, place, and time. He appears well-developed and well-nourished. No distress.   HENT:   Head: Normocephalic and atraumatic.   Right Ear: External ear normal.   Left Ear: External ear normal.   Nose: Nose normal.   Mouth/Throat: Oropharynx is clear and moist. No oropharyngeal exudate.   Eyes: Conjunctivae and EOM are normal. Pupils are equal, round, and reactive to light. Right eye exhibits no discharge. Left eye exhibits no discharge.   Neck: Normal range of motion. Neck supple. No thyromegaly present.   Cardiovascular: Normal rate, regular rhythm, normal heart sounds and intact distal pulses. Exam reveals no gallop and no friction rub.   No murmur heard.  Pulmonary/Chest: Effort normal and breath sounds normal. No respiratory distress. He has no wheezes. He has no rales. He exhibits no tenderness.   Abdominal: Soft. Bowel sounds are normal. He exhibits no distension. There is no tenderness. There is no rebound and no guarding.   Musculoskeletal: Normal range of motion. He exhibits no edema, tenderness or deformity.   Lymphadenopathy:     He has no cervical adenopathy.   Neurological: He is alert and oriented to person, place, and time.   Skin: Skin is warm and dry. Capillary refill takes less than 2 seconds. No rash noted. No erythema.   Psychiatric: He has a normal mood and affect. Judgment and thought content normal.   Nursing note and vitals reviewed.      Assessment:       1. Chronic tension-type headache, not intractable    2. Chronic low back pain with sciatica, sciatica laterality unspecified, unspecified back pain laterality    3. Neck pain    4. Lumbar  radiculopathy    5. Sciatica of left side    6. Tension-type headache, not intractable, unspecified chronicity pattern    7. Low testosterone in male    8. Hypertension, unspecified type    9. Prostate cancer screening        Plan:       Chronic tension-type headache, not intractable    Chronic low back pain with sciatica, sciatica laterality unspecified, unspecified back pain laterality    Neck pain    Lumbar radiculopathy    Sciatica of left side    Tension-type headache, not intractable, unspecified chronicity pattern  -     butalbital-acetaminophen-caffeine -40 mg (FIORICET, ESGIC) -40 mg per tablet; Take 1 tablet by mouth every 6 (six) hours as needed.  Dispense: 30 tablet; Refill: 1    Low testosterone in male    Hypertension, unspecified type  -     CBC auto differential; Future; Expected date: 11/13/2018  -     Comprehensive metabolic panel; Future; Expected date: 11/13/2018  -     Lipid panel; Future; Expected date: 11/13/2018  -     X-Ray Chest PA And Lateral; Future; Expected date: 11/13/2018  -     POCT URINE DIPSTICK WITHOUT MICROSCOPE  -     SCHEDULED EKG 12-LEAD (to Muse); Future    Prostate cancer screening  -     PSA, Screening; Future; Expected date: 11/13/2018

## 2018-12-09 RX ORDER — ATENOLOL 50 MG/1
TABLET ORAL
Qty: 60 TABLET | Refills: 0 | Status: SHIPPED | OUTPATIENT
Start: 2018-12-09 | End: 2018-12-14 | Stop reason: SDUPTHER

## 2018-12-11 ENCOUNTER — TELEPHONE (OUTPATIENT)
Dept: PRIMARY CARE CLINIC | Facility: CLINIC | Age: 48
End: 2018-12-11

## 2018-12-11 DIAGNOSIS — G44.209 TENSION-TYPE HEADACHE, NOT INTRACTABLE, UNSPECIFIED CHRONICITY PATTERN: ICD-10-CM

## 2018-12-11 RX ORDER — BUTALBITAL, ACETAMINOPHEN AND CAFFEINE 50; 325; 40 MG/1; MG/1; MG/1
1 TABLET ORAL EVERY 6 HOURS PRN
Qty: 30 TABLET | Refills: 1 | OUTPATIENT
Start: 2018-12-11

## 2018-12-14 DIAGNOSIS — M54.16 BILATERAL LUMBAR RADICULOPATHY: ICD-10-CM

## 2018-12-14 DIAGNOSIS — M54.30 SCIATICA, UNSPECIFIED LATERALITY: ICD-10-CM

## 2018-12-14 DIAGNOSIS — M54.16 LUMBAR RADICULOPATHY: ICD-10-CM

## 2018-12-14 DIAGNOSIS — G89.29 CHRONIC LOW BACK PAIN WITH SCIATICA, SCIATICA LATERALITY UNSPECIFIED, UNSPECIFIED BACK PAIN LATERALITY: ICD-10-CM

## 2018-12-14 DIAGNOSIS — G44.209 TENSION-TYPE HEADACHE, NOT INTRACTABLE, UNSPECIFIED CHRONICITY PATTERN: ICD-10-CM

## 2018-12-14 DIAGNOSIS — M54.16 RIGHT LUMBAR RADICULOPATHY: ICD-10-CM

## 2018-12-14 DIAGNOSIS — G44.229 CHRONIC TENSION-TYPE HEADACHE, NOT INTRACTABLE: ICD-10-CM

## 2018-12-14 DIAGNOSIS — M54.32 SCIATICA OF LEFT SIDE: ICD-10-CM

## 2018-12-14 DIAGNOSIS — M54.12 LEFT CERVICAL RADICULOPATHY: ICD-10-CM

## 2018-12-14 DIAGNOSIS — M54.2 NECK PAIN: ICD-10-CM

## 2018-12-14 DIAGNOSIS — M54.40 CHRONIC LOW BACK PAIN WITH SCIATICA, SCIATICA LATERALITY UNSPECIFIED, UNSPECIFIED BACK PAIN LATERALITY: ICD-10-CM

## 2018-12-14 DIAGNOSIS — M54.41 CHRONIC BILATERAL LOW BACK PAIN WITH RIGHT-SIDED SCIATICA: ICD-10-CM

## 2018-12-14 DIAGNOSIS — G89.29 CHRONIC BILATERAL LOW BACK PAIN WITH RIGHT-SIDED SCIATICA: ICD-10-CM

## 2018-12-14 DIAGNOSIS — M62.838 MUSCLE SPASM: ICD-10-CM

## 2018-12-14 RX ORDER — BUTALBITAL, ACETAMINOPHEN AND CAFFEINE 50; 325; 40 MG/1; MG/1; MG/1
1 TABLET ORAL EVERY 6 HOURS PRN
Qty: 30 TABLET | Refills: 1 | Status: SHIPPED | OUTPATIENT
Start: 2018-12-14 | End: 2019-04-08 | Stop reason: SDUPTHER

## 2018-12-14 RX ORDER — ATENOLOL 50 MG/1
50 TABLET ORAL 2 TIMES DAILY
Qty: 60 TABLET | Refills: 2 | Status: SHIPPED | OUTPATIENT
Start: 2018-12-14 | End: 2019-01-21 | Stop reason: ALTCHOICE

## 2018-12-14 RX ORDER — GABAPENTIN 300 MG/1
600 CAPSULE ORAL NIGHTLY
Qty: 60 CAPSULE | Refills: 11 | Status: SHIPPED | OUTPATIENT
Start: 2018-12-14 | End: 2020-02-06

## 2018-12-14 NOTE — TELEPHONE ENCOUNTER
----- Message from Juan A Gaines sent at 12/14/2018  1:38 PM CST -----  Contact: patient  Type: Needs Medical Advice    Who Called:  patient  Symptoms (please be specific):    How long has patient had these symptoms:    Pharmacy name and phone #:  [facility] [phone]  Best Call Back Number: [phone]  Additional Information: stated pharmacy advise didn't received Rx for atenolol (TENORMIN) 50 MG tablet,requesting a call back

## 2018-12-14 NOTE — TELEPHONE ENCOUNTER
----- Message from Susan Clemons sent at 12/14/2018  1:15 PM CST -----  Contact: Dori (wife) @ [phone]  Pt is req a refill for gabapentin (NEURONTIN) 400 mg tablet, butalbital-acetaminophen-caffeine -40 mg (FIORICET) -40 mg per tablet and trazadone.

## 2018-12-14 NOTE — TELEPHONE ENCOUNTER
----- Message from Tomasa Felton sent at 12/14/2018  1:53 PM CST -----  Contact: Wife  Type: Needs Medical Advice    Who Called:  Elinor, wife  Symptoms (please be specific):  N/A  How long has patient had these symptoms:  N/A  Pharmacy name and phone #:    [facility] - [place], [place] - [address]  [address]  [facility] [address]  Phone: [phone] Fax: [phone]  Best Call Back Number: [phone]  Additional Information: Calling because [facility] is saying they never received Rx atenolol (TENORMIN) 50 MG tablet. Please call [facility]. Thanks.

## 2019-01-17 RX ORDER — ATENOLOL 50 MG/1
TABLET ORAL
Qty: 60 TABLET | Refills: 0 | Status: SHIPPED | OUTPATIENT
Start: 2019-01-17 | End: 2019-01-21 | Stop reason: SDUPTHER

## 2019-01-17 NOTE — TELEPHONE ENCOUNTER
----- Message from Gabriel Gomes sent at 1/17/2019  1:30 PM CST -----  Type:  RX Refill Request    Who [facility]:  [facility]  Refill or New Rx:    RX Name and Strength:  atenolol (TENORMIN) 50 MG tablet       How is the patient currently taking it? (ex. 1XDay):  2XDay  Is this a 30 day or 90 day RX:  30  Preferred Pharmacy with phone number:        [facility] - [address] [address]  Phone: [phone] Fax: [phone]      Local or Mail Order:  Local  Ordering Provider:  Vitaly Buitrago Call Back Number:  [phone]  Additional Information:

## 2019-01-21 ENCOUNTER — OFFICE VISIT (OUTPATIENT)
Dept: PRIMARY CARE CLINIC | Facility: CLINIC | Age: 49
End: 2019-01-21
Payer: MEDICAID

## 2019-01-21 VITALS
BODY MASS INDEX: 28.44 KG/M2 | DIASTOLIC BLOOD PRESSURE: 74 MMHG | TEMPERATURE: 98 F | RESPIRATION RATE: 18 BRPM | HEART RATE: 87 BPM | SYSTOLIC BLOOD PRESSURE: 139 MMHG | WEIGHT: 192 LBS | HEIGHT: 69 IN | OXYGEN SATURATION: 99 %

## 2019-01-21 DIAGNOSIS — Z13.6 ENCOUNTER FOR LIPID SCREENING FOR CARDIOVASCULAR DISEASE: ICD-10-CM

## 2019-01-21 DIAGNOSIS — R79.89 LOW TESTOSTERONE IN MALE: ICD-10-CM

## 2019-01-21 DIAGNOSIS — Z12.5 PROSTATE CANCER SCREENING: ICD-10-CM

## 2019-01-21 DIAGNOSIS — F41.9 ANXIETY: ICD-10-CM

## 2019-01-21 DIAGNOSIS — Z00.00 ROUTINE PHYSICAL EXAMINATION: Primary | ICD-10-CM

## 2019-01-21 DIAGNOSIS — I10 HYPERTENSION, UNSPECIFIED TYPE: ICD-10-CM

## 2019-01-21 DIAGNOSIS — Z13.220 ENCOUNTER FOR LIPID SCREENING FOR CARDIOVASCULAR DISEASE: ICD-10-CM

## 2019-01-21 PROCEDURE — 99213 OFFICE O/P EST LOW 20 MIN: CPT | Mod: S$PBB,,, | Performed by: NURSE PRACTITIONER

## 2019-01-21 PROCEDURE — 99999 PR PBB SHADOW E&M-EST. PATIENT-LVL IV: CPT | Mod: PBBFAC,,, | Performed by: NURSE PRACTITIONER

## 2019-01-21 PROCEDURE — 99213 PR OFFICE/OUTPT VISIT, EST, LEVL III, 20-29 MIN: ICD-10-PCS | Mod: S$PBB,,, | Performed by: NURSE PRACTITIONER

## 2019-01-21 PROCEDURE — 99999 PR PBB SHADOW E&M-EST. PATIENT-LVL IV: ICD-10-PCS | Mod: PBBFAC,,, | Performed by: NURSE PRACTITIONER

## 2019-01-21 PROCEDURE — 99214 OFFICE O/P EST MOD 30 MIN: CPT | Mod: PBBFAC,PN | Performed by: NURSE PRACTITIONER

## 2019-01-21 RX ORDER — DEXTROAMPHETAMINE SACCHARATE, AMPHETAMINE ASPARTATE, DEXTROAMPHETAMINE SULFATE AND AMPHETAMINE SULFATE 5; 5; 5; 5 MG/1; MG/1; MG/1; MG/1
1 TABLET ORAL 3 TIMES DAILY
Refills: 0 | COMMUNITY
Start: 2019-01-07 | End: 2020-02-06

## 2019-01-21 RX ORDER — ATENOLOL 50 MG/1
50 TABLET ORAL 2 TIMES DAILY
Qty: 180 TABLET | Refills: 1 | Status: SHIPPED | OUTPATIENT
Start: 2019-01-21 | End: 2019-09-26 | Stop reason: SDUPTHER

## 2019-01-21 NOTE — ASSESSMENT & PLAN NOTE
Controlled at this time.  Patient takes atenolol as needed for anxiety and hypertension.  Continue on current regimen.

## 2019-01-21 NOTE — PROGRESS NOTES
Chief Complaint  Chief Complaint   Patient presents with    Medication Refill       HPI    Linh Schmidt is a 48 y.o. male with multiple medical diagnoses as listed in the medical history and problem list that presents for medication refill.    Hypertension, anxiety-patient with history of hypertension, anxiety currently on atenolol 50 mg twice daily to be taken as needed.  Has been on current regimen for greater than 5 years.  No noted side effects.  Not currently checking blood pressures at home.  Presents to clinic with blood pressure within normal limits.  Did not take medication this morning.  No chest pain or palpitations.  No recent lab work.  Patient due for full lab work and lipid screening at this time.    PAST MEDICAL HISTORY:  Past Medical History:   Diagnosis Date    Anxiety     GERD (gastroesophageal reflux disease)     Hypertension     Osteoarthritis        PAST SURGICAL HISTORY:  Past Surgical History:   Procedure Laterality Date    dental implants         SOCIAL HISTORY:  Social History     Socioeconomic History    Marital status:      Spouse name: Not on file    Number of children: Not on file    Years of education: Not on file    Highest education level: Not on file   Social Needs    Financial resource strain: Not on file    Food insecurity - worry: Not on file    Food insecurity - inability: Not on file    Transportation needs - medical: Not on file    Transportation needs - non-medical: Not on file   Occupational History    Not on file   Tobacco Use    Smoking status: Former Smoker    Smokeless tobacco: Never Used   Substance and Sexual Activity    Alcohol use: No    Drug use: No    Sexual activity: Yes     Partners: Female   Other Topics Concern    Patient feels they ought to cut down on drinking/drug use No    Patient annoyed by others criticizing their drinking/drug use No    Patient has felt bad or guilty about drinking/drug use No    Patient has had a  "drink/used drugs as an eye opener in the AM No   Social History Narrative    Not on file       FAMILY HISTORY:  Family History   Problem Relation Age of Onset    Mental illness Mother     Diabetes Maternal Grandmother     Heart disease Maternal Grandfather     Hyperlipidemia Maternal Grandfather     Stroke Maternal Grandfather        ALLERGIES AND MEDICATIONS: updated and reviewed.  Review of patient's allergies indicates:  No Known Allergies  Current Outpatient Medications   Medication Sig Dispense Refill    atenolol (TENORMIN) 50 MG tablet Take 1 tablet (50 mg total) by mouth 2 (two) times daily. 180 tablet 1    butalbital-acetaminophen-caffeine -40 mg (FIORICET, ESGIC) -40 mg per tablet Take 1 tablet by mouth every 6 (six) hours as needed. 30 tablet 1    dextroamphetamine-amphetamine (ADDERALL) 20 mg tablet Take 1 tablet by mouth 3 (three) times daily.  0    escitalopram oxalate (LEXAPRO) 20 MG tablet Take 20 mg by mouth once daily.      gabapentin (NEURONTIN) 300 MG capsule Take 2 capsules (600 mg total) by mouth every evening. 60 capsule 11    testosterone cypionate (DEPOTESTOTERONE CYPIONATE) 200 mg/mL injection INJECT 1 ML INTRAMUSCULARLY EVERY WEEK  1     No current facility-administered medications for this visit.          ROS  Review of Systems   Constitutional: Negative for chills and fever.   HENT: Negative for ear pain, postnasal drip and sinus pain.    Respiratory: Negative for cough and shortness of breath.    Cardiovascular: Negative for chest pain.   Gastrointestinal: Negative for diarrhea, nausea and vomiting.   Psychiatric/Behavioral: Negative for sleep disturbance. The patient is nervous/anxious.            PHYSICAL EXAM  Vitals:    01/21/19 1006   BP: 139/74   BP Location: Right arm   Patient Position: Sitting   BP Method: Medium (Automatic)   Pulse: 87   Resp: 18   Temp: 97.7 °F (36.5 °C)   TempSrc: Oral   SpO2: 99%   Weight: 87.1 kg (192 lb)   Height: 5' 9" (1.753 m)    " "Body mass index is 28.35 kg/m².  Weight: 87.1 kg (192 lb)   Height: 5' 9" (175.3 cm)     Physical Exam   Constitutional: He is oriented to person, place, and time. He appears well-developed and well-nourished.   HENT:   Head: Normocephalic.   Right Ear: Tympanic membrane normal.   Left Ear: Tympanic membrane normal.   Mouth/Throat: Uvula is midline, oropharynx is clear and moist and mucous membranes are normal.   Eyes: Conjunctivae are normal.   Cardiovascular: Normal rate, regular rhythm, normal heart sounds and normal pulses.   No murmur heard.  Pulses:       Radial pulses are 2+ on the right side, and 2+ on the left side.   No LE swelling noted   Pulmonary/Chest: Effort normal and breath sounds normal. He has no wheezes.   Abdominal: Soft. Bowel sounds are normal. There is no tenderness.   Musculoskeletal: He exhibits no edema.   Lymphadenopathy:     He has no cervical adenopathy.   Neurological: He is alert and oriented to person, place, and time.   Skin: Skin is warm and dry. No rash noted.   Psychiatric: He has a normal mood and affect.         Health Maintenance       Date Due Completion Date    TETANUS VACCINE 06/10/1989 ---    Lipid Panel 09/19/2019 9/19/2014            Assessment & Plan    Problem List Items Addressed This Visit        1 - High    HTN (hypertension)    Current Assessment & Plan     Controlled at this time.  Continue on current regimen.         Relevant Orders    CBC auto differential    Comprehensive metabolic panel    TSH       2     Anxiety    Current Assessment & Plan     Controlled at this time.  Patient takes atenolol as needed for anxiety and hypertension.  Continue on current regimen.            3     Low testosterone in male    Current Assessment & Plan     Screening PSA due to testosterone replacement.           Other Visit Diagnoses     Routine physical examination    -  Primary    Encounter for lipid screening for cardiovascular disease        Relevant Orders    Lipid panel    " Prostate cancer screening        Relevant Orders    PSA, Screening          Follow-up: No Follow-up on file.    Elena Martinez       Medication List           Accurate as of 1/21/19 10:20 AM. If you have any questions, ask your nurse or doctor.               CHANGE how you take these medications    atenolol 50 MG tablet  Commonly known as:  TENORMIN  Take 1 tablet (50 mg total) by mouth 2 (two) times daily.  What changed:  Another medication with the same name was removed. Continue taking this medication, and follow the directions you see here.  Changed by:  Elena Martinez NP        CONTINUE taking these medications    butalbital-acetaminophen-caffeine -40 mg -40 mg per tablet  Commonly known as:  FIORICET, ESGIC  Take 1 tablet by mouth every 6 (six) hours as needed.     dextroamphetamine-amphetamine 20 mg tablet  Commonly known as:  ADDERALL     escitalopram oxalate 20 MG tablet  Commonly known as:  LEXAPRO     gabapentin 300 MG capsule  Commonly known as:  NEURONTIN  Take 2 capsules (600 mg total) by mouth every evening.     testosterone cypionate 200 mg/mL injection  Commonly known as:  DEPOTESTOTERONE CYPIONATE           Where to Get Your Medications      These medications were sent to [facility]  [address]  [address] [facility] [place] [address]    Phone:  [phone]   · atenolol 50 MG tablet

## 2019-04-08 ENCOUNTER — OFFICE VISIT (OUTPATIENT)
Dept: PHYSICAL MEDICINE AND REHAB | Facility: CLINIC | Age: 49
End: 2019-04-08
Payer: MEDICAID

## 2019-04-08 VITALS
WEIGHT: 198.44 LBS | HEIGHT: 69 IN | DIASTOLIC BLOOD PRESSURE: 86 MMHG | HEART RATE: 65 BPM | BODY MASS INDEX: 29.39 KG/M2 | SYSTOLIC BLOOD PRESSURE: 134 MMHG

## 2019-04-08 DIAGNOSIS — G89.29 CHRONIC LOW BACK PAIN WITH SCIATICA, SCIATICA LATERALITY UNSPECIFIED, UNSPECIFIED BACK PAIN LATERALITY: ICD-10-CM

## 2019-04-08 DIAGNOSIS — G44.89 CHRONIC MIXED HEADACHE SYNDROME: Primary | ICD-10-CM

## 2019-04-08 DIAGNOSIS — G44.209 TENSION-TYPE HEADACHE, NOT INTRACTABLE, UNSPECIFIED CHRONICITY PATTERN: ICD-10-CM

## 2019-04-08 DIAGNOSIS — M54.32 SCIATICA OF LEFT SIDE: ICD-10-CM

## 2019-04-08 DIAGNOSIS — M62.838 MUSCLE SPASM: ICD-10-CM

## 2019-04-08 DIAGNOSIS — G43.109 MIGRAINE WITH AURA AND WITHOUT STATUS MIGRAINOSUS, NOT INTRACTABLE: ICD-10-CM

## 2019-04-08 DIAGNOSIS — M54.40 CHRONIC LOW BACK PAIN WITH SCIATICA, SCIATICA LATERALITY UNSPECIFIED, UNSPECIFIED BACK PAIN LATERALITY: ICD-10-CM

## 2019-04-08 PROCEDURE — 99213 OFFICE O/P EST LOW 20 MIN: CPT | Mod: PBBFAC | Performed by: PHYSICAL MEDICINE & REHABILITATION

## 2019-04-08 PROCEDURE — 99214 PR OFFICE/OUTPT VISIT, EST, LEVL IV, 30-39 MIN: ICD-10-PCS | Mod: S$PBB,,, | Performed by: PHYSICAL MEDICINE & REHABILITATION

## 2019-04-08 PROCEDURE — 99999 PR PBB SHADOW E&M-EST. PATIENT-LVL III: CPT | Mod: PBBFAC,,, | Performed by: PHYSICAL MEDICINE & REHABILITATION

## 2019-04-08 PROCEDURE — 99999 PR PBB SHADOW E&M-EST. PATIENT-LVL III: ICD-10-PCS | Mod: PBBFAC,,, | Performed by: PHYSICAL MEDICINE & REHABILITATION

## 2019-04-08 PROCEDURE — 99214 OFFICE O/P EST MOD 30 MIN: CPT | Mod: S$PBB,,, | Performed by: PHYSICAL MEDICINE & REHABILITATION

## 2019-04-08 RX ORDER — BUTALBITAL, ACETAMINOPHEN AND CAFFEINE 50; 325; 40 MG/1; MG/1; MG/1
1 TABLET ORAL EVERY 6 HOURS PRN
Qty: 120 TABLET | Refills: 5 | Status: SHIPPED | OUTPATIENT
Start: 2019-04-08 | End: 2019-05-08

## 2019-04-08 RX ORDER — BUTALBITAL, ACETAMINOPHEN AND CAFFEINE 50; 325; 40 MG/1; MG/1; MG/1
1 TABLET ORAL EVERY 6 HOURS PRN
Qty: 30 TABLET | Refills: 5 | Status: SHIPPED | OUTPATIENT
Start: 2019-04-08 | End: 2019-04-08 | Stop reason: DRUGHIGH

## 2019-04-08 NOTE — PROGRESS NOTES
Subjective:       Patient ID: Linh Schmidt is a 48 y.o. male.    Chief Complaint: Back Pain and Headache    Back Pain   Associated symptoms include headaches and leg pain. Pertinent negatives include no abdominal pain, chest pain or fever.   Headache    Associated symptoms include back pain and neck pain. Pertinent negatives include no abdominal pain, dizziness, eye pain or fever.   Leg Pain      Migraine    Associated symptoms include back pain and neck pain. Pertinent negatives include no abdominal pain, dizziness, eye pain or fever.   Neck Pain    Associated symptoms include headaches and leg pain. Pertinent negatives include no chest pain, fever or trouble swallowing.   Mr. Schmidt returns to clinic for chronic back, and neck pain.  LCV 02/19/19.  He reports that his back pain is well controled, and headaches with current pain management, Fioricet,   That he is taking quite a long with no increase in dose.  Today he complains about back, neck  Pain, and headache.    #1back and left leg pain.   Current back and leg pain is 2, and worst pain is 6.   Back pain radiates only to right leg pain.  Back pain and right leg pain is constant every day pain , with changes in intensity,   becomes severe once a month.   Back pain is described as early morning stiffness, dull ache, soreness like pain, that goes down to right leg, as numbness.   Bending forward increases, and changes pain to shooting, stinging  pain in right leg, down the right buttock,   back of thigh, and calf  (does not go to foot/ heel), rarely feels as electric shock.  Bending forward, activities, increase the pain, lying on hard surface improves the pain.   No limitation in walking, prolong sitting increases the pain.    # 2 neck pain, improved to resolved.  Current neck pain is 0/10, worst pain is 1-2/10.   No neck pain radiation to sides to shoulders/arms.    Neck pain is off/on pain, mainly during day time, and neck feels stiff, jeffrey. early in  "morning.   Takes Fioricet ( takes 3-4 tabs/days) and Neurontin in evening helps.      #3 Headache,   Today, c/o headache, and asking if his HA is risk for stroke.  Now getting HA more frequent , maybe every day, compare to 3-4x / week.   Has a HA since 14 y/o. States that his mother has had a severe migraine HA.  Now he gets HA more often and it is localized behind Rt eye.   He states that he had an aura, he states that it starts that it is visual blinking.  He states that it lasts 30-60 minutes usually, but sometimes "bad HA" lasts ~10-12 hrs, and affects him for the next day,   And he gets a residual pain.  He states that Aderal makes HA worse, so he plans to stop taking it entirely.  But does not take Elavil regularly.   Takes Fioricet 2 tabs BID-QID, for HA pain.   For the breakthrough he takes Ibuprofen or aspirin, that helps.   Does not take Imitrex.   Takes Gabapentin, one tabs at bedtime irregularly,  and Fioricet  1-2 tabs/ /day on good day,   and 3- 4 /day for the most severe pain. Lately he was taking 4 tabs/day,secondary to cold weather, he had more pain.   Exercises regularly , goes to gym 5 x /week, in past he was picking up heavy weight up 340 lbs,   squating, stop " squats  2 yrs ago (because of back/ leg pain).   Now doing only bench press ~ 100 llbs, 3-4 x/week.   He c/o   Here alessandro follow up and chronic pain management.     Past Medical History:   Diagnosis Date    Anxiety     GERD (gastroesophageal reflux disease)     Hypertension     Osteoarthritis        Past Surgical History:   Procedure Laterality Date    dental implants         Family History   Problem Relation Age of Onset    Mental illness Mother     Diabetes Maternal Grandmother     Heart disease Maternal Grandfather     Hyperlipidemia Maternal Grandfather     Stroke Maternal Grandfather        Social History     Socioeconomic History    Marital status:      Spouse name: Not on file    Number of children: Not on file "    Years of education: Not on file    Highest education level: Not on file   Occupational History    Not on file   Social Needs    Financial resource strain: Not on file    Food insecurity:     Worry: Not on file     Inability: Not on file    Transportation needs:     Medical: Not on file     Non-medical: Not on file   Tobacco Use    Smoking status: Former Smoker    Smokeless tobacco: Never Used   Substance and Sexual Activity    Alcohol use: No    Drug use: No    Sexual activity: Yes     Partners: Female   Lifestyle    Physical activity:     Days per week: Not on file     Minutes per session: Not on file    Stress: Not on file   Relationships    Social connections:     Talks on phone: Not on file     Gets together: Not on file     Attends [employer] service: Not on file     Active member of club or organization: Not on file     Attends meetings of clubs or organizations: Not on file     Relationship status: Not on file   Other Topics Concern    Patient feels they ought to cut down on drinking/drug use No    Patient annoyed by others criticizing their drinking/drug use No    Patient has felt bad or guilty about drinking/drug use No    Patient has had a drink/used drugs as an eye opener in the AM No   Social History Narrative    Not on file       Current Outpatient Medications   Medication Sig Dispense Refill    atenolol (TENORMIN) 50 MG tablet Take 1 tablet (50 mg total) by mouth 2 (two) times daily. 180 tablet 1    butalbital-acetaminophen-caffeine -40 mg (FIORICET, ESGIC) -40 mg per tablet Take 1 tablet by mouth every 6 (six) hours as needed for Pain or Headaches. 120 tablet 5    dextroamphetamine-amphetamine (ADDERALL) 20 mg tablet Take 1 tablet by mouth 3 (three) times daily.  0    escitalopram oxalate (LEXAPRO) 20 MG tablet Take 20 mg by mouth once daily.      gabapentin (NEURONTIN) 300 MG capsule Take 2 capsules (600 mg total) by mouth every evening. 60 capsule 11     testosterone cypionate (DEPOTESTOTERONE CYPIONATE) 200 mg/mL injection INJECT 1 ML INTRAMUSCULARLY EVERY WEEK  1     No current facility-administered medications for this visit.        Review of patient's allergies indicates:  No Known Allergies    Review of Systems   Constitutional: Negative for activity change, appetite change, chills, diaphoresis, fatigue, fever and unexpected weight change.   HENT: Negative for trouble swallowing and voice change.    Eyes: Negative for pain and visual disturbance.   Respiratory: Negative for chest tightness, shortness of breath and wheezing.    Cardiovascular: Negative for chest pain, palpitations and leg swelling.   Gastrointestinal: Negative for abdominal pain, constipation and diarrhea.   Genitourinary: Negative for difficulty urinating, frequency and urgency.   Musculoskeletal: Positive for back pain and neck pain. Negative for arthralgias, joint swelling, myalgias and neck stiffness.   Skin: Negative for rash and wound.   Neurological: Positive for headaches. Negative for dizziness, facial asymmetry, speech difficulty and light-headedness.   Hematological: Negative for adenopathy.   Psychiatric/Behavioral: Negative for agitation, behavioral problems, confusion, decreased concentration, dysphoric mood and sleep disturbance.       Objective:      Physical Exam    GENERAL: The patient is alert, oriented, pleasant.   PHYSICAL EXAM:  GENERAL: The patient is alert, oriented, pleasant.   HEENT: PERRLA  NECK: supple, no masses, JVP normal.  CV: S1S2, RRR, no murmurs, rales.  LUNGS: CTA bilateral.   ABDOMEN: soft, non-tender, non distended, bowel sounds nl.  EXTREMITIES: no edema, +2 pulses DP, b/l  SKIN: no skin rash, no skin breakdowns.  MUSCULOSKELETAL:   Gait is normal limits.   Cervical spine full range of motion in all planes.   Lumbar spine, full range of motion in all planes, flexion 90 degrees,    Side bending and rotation to 20-25 degrees.  Straight leg raising Positive on  Right .   Full range of motion in all joints x4 extremities.   Muscle strength 5/5 throughout x4 extremities.   No  joint laxity throughout x4 extremities.   NEUROLOGIC: Cranial nerves II through XII intact.   Deep tendon reflexes is normal, +2 in the upper and lower extremities bilaterally.   Muscle tone is normal.   Babinski is Negative bilaterally.   Sensory is intact to light touch and pinprick throughout x4 extremities.     X ray of Lumbar spine showed Narrowed L4 -- L5 disk space    MRI of lumbar spine showed:   Degenerative changes at L4-5, contributing to moderate right neural foraminal stenosis,    possibly impinging upon the exiting right L4 nerve root.  Moderate right-sided facet joint arthropathy noted at L4-5 and L5-S1.     Assessment:       1. Chronic mixed headache syndrome    2. Chronic low back pain with sciatica, sciatica laterality unspecified, unspecified back pain laterality    3. Muscle spasm    4. Sciatica of left side    5. Tension-type headache, not intractable, unspecified chronicity pattern    6. Migraine with aura and without status migrainosus, not intractable        Plan:       Chronic mixed headache syndrome  -     MRI Brain W WO Contrast; Future; Expected date: 04/08/2019    Chronic low back pain with sciatica, sciatica laterality unspecified, unspecified back pain laterality  -     Discontinue: butalbital-acetaminophen-caffeine -40 mg (FIORICET, ESGIC) -40 mg per tablet; Take 1 tablet by mouth every 6 (six) hours as needed.  Dispense: 30 tablet; Refill: 5  -     butalbital-acetaminophen-caffeine -40 mg (FIORICET, ESGIC) -40 mg per tablet; Take 1 tablet by mouth every 6 (six) hours as needed for Pain or Headaches.  Dispense: 120 tablet; Refill: 5    Muscle spasm  -     Discontinue: butalbital-acetaminophen-caffeine -40 mg (FIORICET, ESGIC) -40 mg per tablet; Take 1 tablet by mouth every 6 (six) hours as needed.  Dispense: 30 tablet; Refill: 5  -      butalbital-acetaminophen-caffeine -40 mg (FIORICET, ESGIC) -40 mg per tablet; Take 1 tablet by mouth every 6 (six) hours as needed for Pain or Headaches.  Dispense: 120 tablet; Refill: 5    Sciatica of left side  -     Discontinue: butalbital-acetaminophen-caffeine -40 mg (FIORICET, ESGIC) -40 mg per tablet; Take 1 tablet by mouth every 6 (six) hours as needed.  Dispense: 30 tablet; Refill: 5  -     butalbital-acetaminophen-caffeine -40 mg (FIORICET, ESGIC) -40 mg per tablet; Take 1 tablet by mouth every 6 (six) hours as needed for Pain or Headaches.  Dispense: 120 tablet; Refill: 5    Tension-type headache, not intractable, unspecified chronicity pattern  -     Discontinue: butalbital-acetaminophen-caffeine -40 mg (FIORICET, ESGIC) -40 mg per tablet; Take 1 tablet by mouth every 6 (six) hours as needed.  Dispense: 30 tablet; Refill: 5  -     butalbital-acetaminophen-caffeine -40 mg (FIORICET, ESGIC) -40 mg per tablet; Take 1 tablet by mouth every 6 (six) hours as needed for Pain or Headaches.  Dispense: 120 tablet; Refill: 5    Migraine with aura and without status migrainosus, not intractable  -     Discontinue: butalbital-acetaminophen-caffeine -40 mg (FIORICET, ESGIC) -40 mg per tablet; Take 1 tablet by mouth every 6 (six) hours as needed.  Dispense: 30 tablet; Refill: 5  -     butalbital-acetaminophen-caffeine -40 mg (FIORICET, ESGIC) -40 mg per tablet; Take 1 tablet by mouth every 6 (six) hours as needed for Pain or Headaches.  Dispense: 120 tablet; Refill: 5  -     Ambulatory Referral to Neurology      Patient with chronic back pain secondary to DJD, DDD of Lumbar spine, with possible Right L 4-5 radiculopathy.   He also has a chronic neck pain, secondary to cervical spondylosis, that is improved to resolved.  In addition, he c/o HA, that is combination of migraine HA, and tension type of HA, that is partially  relieved by  Fioricet.    1. Pain management :   Resume  Neurontin, 300 mg one at bed time only,  for neuropathic pain ( recommended but does not take regularly.   He takes Fioricet  3-4 tabs/dy  for HA, and all MSK pains.   Resume Fioricet that he was taking for HA, but helps best with back pain,and does not make him sleepy. Stopped Elavil, since it gives him hangover.     2. HA , that is combination of migraine HA, and tension type of HA, that is partially  relieved by Fioricet,a nd Gabapentin 300 mg at bedtime.  Refer to Neurology for HA eval and  treatment,    Follow up in 4-5 months.     Total time spent face to face with patient was 25 minutes.   More than 50% of that time was spent in counseling on diagnosis , prognosis and treatment options.   I also caunsel patient  on common and most usual side effect of prescribed medications.  I reviewed Primary care , and other specialty's notes to better coordinate patient's  care.   All questions were answered, and patient voiced understanding.

## 2019-06-13 RX ORDER — BUTALBITAL, ACETAMINOPHEN AND CAFFEINE 50; 325; 40 MG/1; MG/1; MG/1
TABLET ORAL
Qty: 120 TABLET | Refills: 0 | Status: SHIPPED | OUTPATIENT
Start: 2019-06-13 | End: 2019-07-15 | Stop reason: SDUPTHER

## 2019-07-15 NOTE — TELEPHONE ENCOUNTER
----- Message from Sara Rodgers sent at 7/15/2019 12:18 PM CDT -----  Rx Refill/Request     Is this a Refill or New Rx:  Refill  Rx Name and Strength:  butalbital-acetaminophen-caffeine -40 mg (FIORICET, ESGIC) -40 mg per tablet    Preferred Pharmacy with phone number:     [facility] - [place] [address] [address]  [address] [facility]  [address] [facility] [address]  Phone: [phone] Fax: [phone]      Communication Preference:Raine (wife) @ [phone]  Additional Information: please call patient's wife regarding her having to call each month for refills.

## 2019-07-16 RX ORDER — BUTALBITAL, ACETAMINOPHEN AND CAFFEINE 50; 325; 40 MG/1; MG/1; MG/1
TABLET ORAL
Qty: 120 TABLET | Refills: 0 | Status: SHIPPED | OUTPATIENT
Start: 2019-07-16 | End: 2019-08-25 | Stop reason: SDUPTHER

## 2019-07-17 RX ORDER — BUTALBITAL, ACETAMINOPHEN AND CAFFEINE 50; 325; 40 MG/1; MG/1; MG/1
TABLET ORAL
Qty: 120 TABLET | Refills: 0 | OUTPATIENT
Start: 2019-07-17

## 2019-08-19 ENCOUNTER — TELEPHONE (OUTPATIENT)
Dept: PHYSICAL MEDICINE AND REHAB | Facility: CLINIC | Age: 49
End: 2019-08-19

## 2019-08-19 NOTE — TELEPHONE ENCOUNTER
----- Message from Sara Rodgers sent at 8/19/2019  1:07 PM CDT -----  Rx Refill/Request     Is this a Refill or New Rx:  Refill    Rx Name and Strength:  butalbital-acetaminophen-caffeine -40 mg (FIORICET, ESGIC) -40 mg per tablet    Preferred Pharmacy with phone number:     [facility] - [address] [address]  Phone: [phone] Fax: [phone]    Communication Preference: Sera (wife) @ [phone]  Additional Information:

## 2019-08-19 NOTE — TELEPHONE ENCOUNTER
Called the pharmacy they pulled the rx the and added the refills.   They will notify the patient.

## 2019-08-21 NOTE — TELEPHONE ENCOUNTER
----- Message from Sara Rodgers sent at 8/21/2019  2:32 PM CDT -----  Rx Refill/Request     Is this a Refill or New Rx:  Refill    Rx Name and Strength:butalbital-acetaminophen-caffeine -40 mg (FIORICET, ESGIC) -40 mg per tablet      Preferred Pharmacy with phone number:     [facility] - [address] [address]  Phone: [phone] Fax: [phone]    Communication Preference:Sera (wife) @ [phone]  Additional Information:

## 2019-08-23 RX ORDER — BUTALBITAL, ACETAMINOPHEN AND CAFFEINE 50; 325; 40 MG/1; MG/1; MG/1
TABLET ORAL
Qty: 120 TABLET | Refills: 0 | OUTPATIENT
Start: 2019-08-23

## 2019-08-25 RX ORDER — BUTALBITAL, ACETAMINOPHEN AND CAFFEINE 50; 325; 40 MG/1; MG/1; MG/1
TABLET ORAL
Qty: 120 TABLET | Refills: 0 | Status: SHIPPED | OUTPATIENT
Start: 2019-08-25 | End: 2019-10-30 | Stop reason: SDUPTHER

## 2019-09-28 RX ORDER — ATENOLOL 50 MG/1
50 TABLET ORAL 2 TIMES DAILY
Qty: 60 TABLET | Refills: 0 | Status: SHIPPED | OUTPATIENT
Start: 2019-09-28 | End: 2019-10-30 | Stop reason: SDUPTHER

## 2019-10-30 DIAGNOSIS — R51.9 HEADACHE, CHRONIC DAILY: Primary | ICD-10-CM

## 2019-10-30 NOTE — TELEPHONE ENCOUNTER
----- Message from Zach Mcleod sent at 10/30/2019  3:01 PM CDT -----  Rx Refill/Request     Is this a Refill or New Rx: Refill  Rx Name and Strength: butalbital-acetaminophen-caffeine -40 mg (FIORICET, ESGIC) -40 mg per tablet  Preferred Pharmacy with phone number: see below  Communication Preference: [phone]  Additional Information:     [facility] [place] [address] [facility]  [address] [facility] [address] [facility] [address]  Phone: [phone] Fax: [phone]

## 2019-10-31 RX ORDER — ATENOLOL 50 MG/1
50 TABLET ORAL 2 TIMES DAILY
Qty: 60 TABLET | Refills: 0 | Status: SHIPPED | OUTPATIENT
Start: 2019-10-31 | End: 2020-01-02

## 2019-11-03 RX ORDER — BUTALBITAL, ACETAMINOPHEN AND CAFFEINE 50; 325; 40 MG/1; MG/1; MG/1
TABLET ORAL
Qty: 120 TABLET | Refills: 1 | Status: SHIPPED | OUTPATIENT
Start: 2019-11-03 | End: 2020-01-24 | Stop reason: SDUPTHER

## 2019-12-31 ENCOUNTER — TELEPHONE (OUTPATIENT)
Dept: PRIMARY CARE CLINIC | Facility: CLINIC | Age: 49
End: 2019-12-31

## 2019-12-31 NOTE — TELEPHONE ENCOUNTER
----- Message from Kelly Kay sent at 12/31/2019 11:36 AM CST -----  Contact: Pt self Mobile/Home [phone]   Patient is calling for an RX refill or new RX.  Is this a refill or new RX:  Refill  RX name and strength: atenolol (TENORMIN) 50 MG tablet  Directions (copy/paste from chart):  N/A  Is this a 30 day or 90 day RX:  30  Local pharmacy or mail order pharmacy:  [facility] [address]  Pharmacy name and phone # [phone], fax# [phone]

## 2020-01-02 RX ORDER — ATENOLOL 50 MG/1
50 TABLET ORAL 2 TIMES DAILY
Qty: 30 TABLET | Refills: 0 | Status: SHIPPED | OUTPATIENT
Start: 2020-01-02 | End: 2021-06-17 | Stop reason: CLARIF

## 2020-01-02 RX ORDER — ATENOLOL 50 MG/1
50 TABLET ORAL 2 TIMES DAILY
Qty: 60 TABLET | Refills: 11 | Status: SHIPPED | OUTPATIENT
Start: 2020-01-02 | End: 2020-02-06 | Stop reason: SDUPTHER

## 2020-01-02 NOTE — TELEPHONE ENCOUNTER
Please let the patient know I have refilled the requested medication for 30 days.  A follow-up needs to be scheduled as the patient has not been seen in an extensive amount of time.  Please see PCP for any further refills.

## 2020-01-02 NOTE — TELEPHONE ENCOUNTER
Refill for to last 2 weeks until appt. On 1/7/20 Pended    ----- Message from Tomasa Felton sent at 1/2/2020  3:18 PM CST -----  Contact: Patient  Type: Needs Medical Advice    Who Called:  Linh, patient  Symptoms (please be specific):  N/A  How long has patient had these symptoms:  N/A  Pharmacy name and phone #:    [facility] - [place], [place] - [address]  [address]  [facility] [address]  Phone: [phone] Fax: [phone]  Best Call Back Number: [phone]  Additional Information: Calling to check on Rx atenolol (TENORMIN) 50 MG tablet. Please call him. Thanks.

## 2020-01-06 NOTE — TELEPHONE ENCOUNTER
Spoke with patient advised him that his medication was refilled. States he has an appointment tomorrow with Dr. Haider.

## 2020-01-24 DIAGNOSIS — R51.9 HEADACHE, CHRONIC DAILY: ICD-10-CM

## 2020-01-24 NOTE — TELEPHONE ENCOUNTER
----- Message from Sara Rodgers sent at 1/24/2020 10:50 AM CST -----  Rx Refill/Request     Is this a Refill or New Rx:  Refill    Rx Name and Strength:  butalbital-acetaminophen-caffeine -40 mg (FIORICET, ESGIC) -40 mg per tablet    Preferred Pharmacy with phone number:    [facility] - [place] [address] [address]  [address] [facility]  [address] [facility] [address]  Phone: [phone] Fax: [phone]    Communication Preference:Sera (wife) @ [phone]  Additional Information: says patient is completely out of the medication.

## 2020-01-28 RX ORDER — BUTALBITAL, ACETAMINOPHEN AND CAFFEINE 50; 325; 40 MG/1; MG/1; MG/1
TABLET ORAL
Qty: 120 TABLET | Refills: 1 | Status: SHIPPED | OUTPATIENT
Start: 2020-01-28 | End: 2020-05-28 | Stop reason: SDUPTHER

## 2020-02-06 ENCOUNTER — OFFICE VISIT (OUTPATIENT)
Dept: PRIMARY CARE CLINIC | Facility: CLINIC | Age: 50
End: 2020-02-06
Payer: MEDICAID

## 2020-02-06 ENCOUNTER — CLINICAL SUPPORT (OUTPATIENT)
Dept: PRIMARY CARE CLINIC | Facility: CLINIC | Age: 50
End: 2020-02-06
Payer: MEDICAID

## 2020-02-06 VITALS
SYSTOLIC BLOOD PRESSURE: 128 MMHG | TEMPERATURE: 98 F | HEART RATE: 111 BPM | OXYGEN SATURATION: 97 % | WEIGHT: 201.5 LBS | BODY MASS INDEX: 29.84 KG/M2 | RESPIRATION RATE: 20 BRPM | HEIGHT: 69 IN | DIASTOLIC BLOOD PRESSURE: 78 MMHG

## 2020-02-06 DIAGNOSIS — Z13.220 ENCOUNTER FOR LIPID SCREENING FOR CARDIOVASCULAR DISEASE: ICD-10-CM

## 2020-02-06 DIAGNOSIS — R00.0 TACHYCARDIA: ICD-10-CM

## 2020-02-06 DIAGNOSIS — I10 ESSENTIAL HYPERTENSION: ICD-10-CM

## 2020-02-06 DIAGNOSIS — R79.89 LOW TESTOSTERONE IN MALE: ICD-10-CM

## 2020-02-06 DIAGNOSIS — G89.29 CHRONIC RIGHT SHOULDER PAIN: ICD-10-CM

## 2020-02-06 DIAGNOSIS — I10 ESSENTIAL HYPERTENSION: Primary | ICD-10-CM

## 2020-02-06 DIAGNOSIS — M25.511 CHRONIC RIGHT SHOULDER PAIN: ICD-10-CM

## 2020-02-06 DIAGNOSIS — Z13.6 ENCOUNTER FOR LIPID SCREENING FOR CARDIOVASCULAR DISEASE: ICD-10-CM

## 2020-02-06 LAB
ALBUMIN SERPL BCP-MCNC: 4.2 G/DL (ref 3.5–5.2)
ALP SERPL-CCNC: 41 U/L (ref 38–126)
ALT SERPL W/O P-5'-P-CCNC: 56 U/L (ref 17–63)
ANION GAP SERPL CALC-SCNC: 15 MMOL/L (ref 8–16)
AST SERPL-CCNC: 42 U/L (ref 15–41)
BASOPHILS # BLD AUTO: 0 K/UL (ref 0–0.2)
BASOPHILS NFR BLD: 0.6 % (ref 0–1.9)
BILIRUB SERPL-MCNC: 1.3 MG/DL (ref 0.3–1.2)
BILIRUB SERPL-MCNC: ABNORMAL MG/DL
BLOOD URINE, POC: ABNORMAL
BUN SERPL-MCNC: 10 MG/DL (ref 6–20)
CALCIUM SERPL-MCNC: 10.6 MG/DL (ref 8.6–10)
CHLORIDE SERPL-SCNC: 96 MMOL/L (ref 101–111)
CHOLEST SERPL-MCNC: 421 MG/DL (ref 80–200)
CHOLEST/HDLC SERPL: 16.8 {RATIO} (ref 2–5)
CO2 SERPL-SCNC: 26 MMOL/L (ref 23–29)
COLOR, POC UA: YELLOW
CREAT SERPL-MCNC: 1.1 MG/DL (ref 0.5–1.4)
DIFFERENTIAL METHOD: ABNORMAL
EOSINOPHIL # BLD AUTO: 0.2 K/UL (ref 0–0.5)
EOSINOPHIL NFR BLD: 2.7 % (ref 0–8)
ERYTHROCYTE [DISTWIDTH] IN BLOOD BY AUTOMATED COUNT: 15.2 % (ref 11.5–14.5)
EST. GFR  (AFRICAN AMERICAN): >60 ML/MIN/1.73 M^2
EST. GFR  (NON AFRICAN AMERICAN): >60 ML/MIN/1.73 M^2
GLUCOSE SERPL-MCNC: 80 MG/DL (ref 74–118)
GLUCOSE UR QL STRIP: NORMAL
HCT VFR BLD AUTO: 51.3 % (ref 40–54)
HDLC SERPL-MCNC: 25 MG/DL (ref 40–75)
HDLC SERPL: 5.9 % (ref 20–50)
HGB BLD-MCNC: 16.5 G/DL (ref 14–18)
KETONES UR QL STRIP: ABNORMAL
LDLC SERPL CALC-MCNC: 329 MG/DL
LEUKOCYTE ESTERASE URINE, POC: ABNORMAL
LYMPHOCYTES # BLD AUTO: 1.2 K/UL (ref 1–4.8)
LYMPHOCYTES NFR BLD: 16.3 % (ref 18–48)
MCH RBC QN AUTO: 28.1 PG (ref 27–31)
MCHC RBC AUTO-ENTMCNC: 32.1 G/DL (ref 32–36)
MCV RBC AUTO: 87 FL (ref 82–98)
MONOCYTES # BLD AUTO: 0.8 K/UL (ref 0.3–1)
MONOCYTES NFR BLD: 10.3 % (ref 4–15)
NEUTROPHILS # BLD AUTO: 5.3 K/UL (ref 1.8–7.7)
NEUTROPHILS NFR BLD: 70.1 % (ref 38–73)
NITRITE, POC UA: ABNORMAL
NONHDLC SERPL-MCNC: 396 MG/DL
PH, POC UA: 6
PLATELET # BLD AUTO: 337 K/UL (ref 150–350)
PMV BLD AUTO: 9.1 FL (ref 9.2–12.9)
POTASSIUM SERPL-SCNC: 3.7 MMOL/L (ref 3.5–5.1)
PROT SERPL-MCNC: 7.3 G/DL (ref 6–8.4)
PROTEIN, POC: ABNORMAL
RBC # BLD AUTO: 5.88 M/UL (ref 4.6–6.2)
SODIUM SERPL-SCNC: 137 MMOL/L (ref 136–145)
SPECIFIC GRAVITY, POC UA: 1.01
T4 FREE SERPL-MCNC: 0.92 NG/DL (ref 0.61–1.12)
TRIGL SERPL-MCNC: 333 MG/DL (ref 30–150)
TSH SERPL DL<=0.005 MIU/L-ACNC: 0.62 UIU/ML (ref 0.45–5.33)
UROBILINOGEN, POC UA: NORMAL
WBC # BLD AUTO: 7.6 K/UL (ref 3.9–12.7)

## 2020-02-06 PROCEDURE — 81002 URINALYSIS NONAUTO W/O SCOPE: CPT | Mod: PBBFAC,PN | Performed by: INTERNAL MEDICINE

## 2020-02-06 PROCEDURE — 84439 ASSAY OF FREE THYROXINE: CPT

## 2020-02-06 PROCEDURE — 84403 ASSAY OF TOTAL TESTOSTERONE: CPT

## 2020-02-06 PROCEDURE — 99999 PR PBB SHADOW E&M-EST. PATIENT-LVL III: CPT | Mod: PBBFAC,,, | Performed by: INTERNAL MEDICINE

## 2020-02-06 PROCEDURE — 85025 COMPLETE CBC W/AUTO DIFF WBC: CPT

## 2020-02-06 PROCEDURE — 99214 OFFICE O/P EST MOD 30 MIN: CPT | Mod: S$PBB,,, | Performed by: INTERNAL MEDICINE

## 2020-02-06 PROCEDURE — 99213 OFFICE O/P EST LOW 20 MIN: CPT | Mod: PBBFAC,PN | Performed by: INTERNAL MEDICINE

## 2020-02-06 PROCEDURE — 80053 COMPREHEN METABOLIC PANEL: CPT

## 2020-02-06 PROCEDURE — 99214 PR OFFICE/OUTPT VISIT, EST, LEVL IV, 30-39 MIN: ICD-10-PCS | Mod: S$PBB,,, | Performed by: INTERNAL MEDICINE

## 2020-02-06 PROCEDURE — 99999 PR PBB SHADOW E&M-EST. PATIENT-LVL III: ICD-10-PCS | Mod: PBBFAC,,, | Performed by: INTERNAL MEDICINE

## 2020-02-06 PROCEDURE — 36415 COLL VENOUS BLD VENIPUNCTURE: CPT | Mod: PBBFAC,PN

## 2020-02-06 PROCEDURE — 80061 LIPID PANEL: CPT

## 2020-02-06 PROCEDURE — 84443 ASSAY THYROID STIM HORMONE: CPT

## 2020-02-06 RX ORDER — ATENOLOL 50 MG/1
50 TABLET ORAL 2 TIMES DAILY
Qty: 60 TABLET | Refills: 11 | Status: SHIPPED | OUTPATIENT
Start: 2020-02-06 | End: 2021-06-24

## 2020-02-06 NOTE — PROGRESS NOTES
Subjective:       Patient ID: Linh Schmidt is a 49 y.o. male.    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 He appears well-developed and well-nourished. No distress.   HENT:   Head: Normocephalic and atraumatic.   Right Ear: External ear normal.   Left Ear: External ear normal.   Nose: Nose normal.   Mouth/Throat: Oropharynx is clear and moist. No oropharyngeal exudate.   Eyes: Pupils are equal, round, and reactive to light. Conjunctivae and EOM are normal. Right eye exhibits no discharge. Left eye exhibits no discharge.   Neck: Normal range of motion. Neck supple. No thyromegaly present.   Cardiovascular: Normal rate, regular rhythm, normal heart sounds and intact distal pulses. Exam reveals no gallop and no friction rub.   No murmur heard.  Pulmonary/Chest: Effort normal and breath sounds normal. No respiratory distress. He has no wheezes. He has no rales. He exhibits no tenderness.   Abdominal: Soft. Bowel sounds are normal. He exhibits no distension. There is no tenderness. There is no rebound and no guarding.   Musculoskeletal: Normal range of motion. He exhibits tenderness (subjective tenderness around shoulder joint with ROM  ). He exhibits no edema or deformity.   Lymphadenopathy:     He has no cervical adenopathy.   Neurological: He is alert and oriented to person, place, and time.   Skin: Skin is  warm and dry. Capillary refill takes less than 2 seconds. No rash noted. No erythema.   Psychiatric: He has a normal mood and affect. Judgment and thought content normal.   Nursing note and vitals reviewed.      Assessment:       1. Essential hypertension    2. Low testosterone in male    3. Encounter for lipid screening for cardiovascular disease    4. Tachycardia    5. Chronic right shoulder pain        Plan:       Essential hypertension  Comments:  fairly stable on meds and diet exercise  Orders:  -     CBC auto differential; Future; Expected date: 02/06/2020  -     Comprehensive metabolic panel; Future; Expected date: 02/06/2020  -     POCT urine dipstick without microscope  -     EKG 12-lead; Future; Expected date: 02/06/2020  -     atenoloL (TENORMIN) 50 MG tablet; Take 1 tablet (50 mg total) by mouth 2 (two) times daily.  Dispense: 60 tablet; Refill: 11    Low testosterone in male  Comments:  check level today  Orders:  -     Testosterone; Future; Expected date: 02/06/2020    Encounter for lipid screening for cardiovascular disease  -     Lipid panel; Future; Expected date: 02/06/2020    Tachycardia  Comments:  anxiety vs medications  Orders:  -     TSH; Future; Expected date: 02/06/2020  -     T4, free; Future; Expected date: 02/06/2020  -     atenoloL (TENORMIN) 50 MG tablet; Take 1 tablet (50 mg total) by mouth 2 (two) times daily.  Dispense: 60 tablet; Refill: 11    Chronic right shoulder pain  Comments:  prob bursitis tendonitis r/o rotator cup tear MRI if not better

## 2020-02-07 LAB — TESTOST SERPL-MCNC: 1335 NG/DL (ref 304–1227)

## 2020-02-11 ENCOUNTER — TELEPHONE (OUTPATIENT)
Dept: PRIMARY CARE CLINIC | Facility: CLINIC | Age: 50
End: 2020-02-11

## 2020-02-11 NOTE — TELEPHONE ENCOUNTER
Left vm to call back about labs    ----- Message from Mark Haider MD sent at 2/9/2020 10:34 AM CST -----  Please call the patient regarding his abnormal result. Testosterone level is high need reduce dosage and high fat high chol need low fat low chol diet give pt instruction and repeat LIPIDS profile in 3 mos since high fat chol can come from high testosterone level need reduce testosterone level first before stsrt medication for high chol fat keep routine appt for f/u with me

## 2020-02-26 ENCOUNTER — TELEPHONE (OUTPATIENT)
Dept: NEUROLOGY | Facility: CLINIC | Age: 50
End: 2020-02-26

## 2020-02-26 ENCOUNTER — OFFICE VISIT (OUTPATIENT)
Dept: PRIMARY CARE CLINIC | Facility: CLINIC | Age: 50
End: 2020-02-26
Payer: MEDICAID

## 2020-02-26 VITALS
OXYGEN SATURATION: 95 % | DIASTOLIC BLOOD PRESSURE: 80 MMHG | RESPIRATION RATE: 18 BRPM | TEMPERATURE: 99 F | HEIGHT: 69 IN | HEART RATE: 79 BPM | SYSTOLIC BLOOD PRESSURE: 122 MMHG | BODY MASS INDEX: 29.54 KG/M2 | WEIGHT: 199.44 LBS

## 2020-02-26 DIAGNOSIS — E78.5 HYPERLIPIDEMIA, UNSPECIFIED HYPERLIPIDEMIA TYPE: Primary | ICD-10-CM

## 2020-02-26 DIAGNOSIS — R79.89 LOW TESTOSTERONE IN MALE: ICD-10-CM

## 2020-02-26 PROCEDURE — 99213 OFFICE O/P EST LOW 20 MIN: CPT | Mod: S$PBB,,, | Performed by: INTERNAL MEDICINE

## 2020-02-26 PROCEDURE — 99214 OFFICE O/P EST MOD 30 MIN: CPT | Mod: PBBFAC,PN | Performed by: INTERNAL MEDICINE

## 2020-02-26 PROCEDURE — 99999 PR PBB SHADOW E&M-EST. PATIENT-LVL IV: CPT | Mod: PBBFAC,,, | Performed by: INTERNAL MEDICINE

## 2020-02-26 PROCEDURE — 99213 PR OFFICE/OUTPT VISIT, EST, LEVL III, 20-29 MIN: ICD-10-PCS | Mod: S$PBB,,, | Performed by: INTERNAL MEDICINE

## 2020-02-26 PROCEDURE — 99999 PR PBB SHADOW E&M-EST. PATIENT-LVL IV: ICD-10-PCS | Mod: PBBFAC,,, | Performed by: INTERNAL MEDICINE

## 2020-02-26 RX ORDER — ATORVASTATIN CALCIUM 40 MG/1
40 TABLET, FILM COATED ORAL DAILY
Qty: 90 TABLET | Refills: 3 | Status: SHIPPED | OUTPATIENT
Start: 2020-02-26 | End: 2023-09-13

## 2020-02-26 NOTE — PROGRESS NOTES
Subjective:       Patient ID: Linh Schmidt is a 49 y.o. male.    Chief Complaint: Follow-up and Results    HPI  patient here for follow-up for left results he has a very high LDL cholesterol in the 300 and elevation of testosterone level patient is on testosterone supplement IM will reduce the doses and repeat the level in 2 months he also will be start on low-cholesterol diet and Lipitor 40 mg a day for high cholesterol he denies short of breath chest pain dyspnea exertion or any other physical symptom  Review of Systems    Objective:      Physical Exam   Constitutional: He is oriented to person, place, and time. He appears well-developed and well-nourished. No distress.   HENT:   Head: Normocephalic and atraumatic.   Right Ear: External ear normal.   Left Ear: External ear normal.   Nose: Nose normal.   Mouth/Throat: Oropharynx is clear and moist. No oropharyngeal exudate.   Eyes: Pupils are equal, round, and reactive to light. Conjunctivae and EOM are normal. Right eye exhibits no discharge. Left eye exhibits no discharge.   Neck: Normal range of motion. Neck supple. No thyromegaly present.   Cardiovascular: Normal rate, regular rhythm, normal heart sounds and intact distal pulses. Exam reveals no gallop and no friction rub.   No murmur heard.  Pulmonary/Chest: Effort normal and breath sounds normal. No respiratory distress. He has no wheezes. He has no rales. He exhibits no tenderness.   Abdominal: Soft. Bowel sounds are normal. He exhibits no distension. There is no tenderness. There is no rebound and no guarding.   Musculoskeletal: Normal range of motion. He exhibits no edema, tenderness or deformity.   Lymphadenopathy:     He has no cervical adenopathy.   Neurological: He is alert and oriented to person, place, and time.   Skin: Skin is warm and dry. Capillary refill takes less than 2 seconds. No rash noted. No erythema.   Psychiatric: He has a normal mood and affect. Judgment and thought content normal.    Nursing note and vitals reviewed.      Assessment:       1. Hyperlipidemia, unspecified hyperlipidemia type    2. Low testosterone in male        Plan:       Hyperlipidemia, unspecified hyperlipidemia type  Comments:  Patient given instruction for low-cholesterol diet  Orders:  -     atorvastatin (LIPITOR) 40 MG tablet; Take 1 tablet (40 mg total) by mouth once daily.  Dispense: 90 tablet; Refill: 3  -     Lipid panel; Future; Expected date: 04/27/2020  -     Comprehensive metabolic panel; Future; Expected date: 04/27/2020    Low testosterone in male  -     Testosterone; Future; Expected date: 04/27/2020

## 2020-02-27 NOTE — TELEPHONE ENCOUNTER
----- Message from Tanya Hernandez sent at 2/26/2020  4:29 PM CST -----  Contact: pt portal  Appointment Request From: Linh Schmidt    With Provider: Edwina Altman MD [[facility]]    Preferred Date Range: 2/26/2020 - 4/26/2020    Preferred Times: Any time    Reason for visit: Existing Patient    Comments:  Talk to Dr Altman about some neck issues and get refills on my meds.

## 2020-02-28 DIAGNOSIS — R00.0 TACHYCARDIA: ICD-10-CM

## 2020-02-28 DIAGNOSIS — R94.31 ABNORMAL EKG: Primary | ICD-10-CM

## 2020-03-10 ENCOUNTER — PATIENT OUTREACH (OUTPATIENT)
Dept: ADMINISTRATIVE | Facility: OTHER | Age: 50
End: 2020-03-10

## 2020-03-11 ENCOUNTER — PATIENT OUTREACH (OUTPATIENT)
Dept: ADMINISTRATIVE | Facility: HOSPITAL | Age: 50
End: 2020-03-11

## 2020-03-18 ENCOUNTER — TELEPHONE (OUTPATIENT)
Dept: CARDIOLOGY | Facility: CLINIC | Age: 50
End: 2020-03-18

## 2020-03-18 ENCOUNTER — PATIENT OUTREACH (OUTPATIENT)
Dept: ADMINISTRATIVE | Facility: OTHER | Age: 50
End: 2020-03-18

## 2020-03-18 NOTE — TELEPHONE ENCOUNTER
Pt. Notified the results of EKG - previous worked as an EMT. Taking baby aspirin 81 mg daily - pt. Will follow up with Dr. Cardenas after the macias has ended.

## 2020-03-18 NOTE — TELEPHONE ENCOUNTER
----- Message from Aleyda Feng sent at 3/18/2020  1:17 PM CDT -----  PT HAS MISSED HIS APPT ON 3/12/2020 AND WOULD LIKE FOR THE NURSE TO GIVE HIM a call back at [phone]

## 2020-03-19 ENCOUNTER — OFFICE VISIT (OUTPATIENT)
Dept: CARDIOLOGY | Facility: CLINIC | Age: 50
End: 2020-03-19
Payer: MEDICAID

## 2020-03-19 VITALS
HEART RATE: 74 BPM | SYSTOLIC BLOOD PRESSURE: 111 MMHG | OXYGEN SATURATION: 98 % | BODY MASS INDEX: 29.63 KG/M2 | DIASTOLIC BLOOD PRESSURE: 55 MMHG | WEIGHT: 200.63 LBS

## 2020-03-19 DIAGNOSIS — R06.00 DYSPNEA, UNSPECIFIED TYPE: ICD-10-CM

## 2020-03-19 DIAGNOSIS — Z76.89 ENCOUNTER TO ESTABLISH CARE: ICD-10-CM

## 2020-03-19 DIAGNOSIS — E78.5 HYPERLIPIDEMIA, UNSPECIFIED HYPERLIPIDEMIA TYPE: ICD-10-CM

## 2020-03-19 DIAGNOSIS — R07.9 CHEST PAIN, UNSPECIFIED TYPE: ICD-10-CM

## 2020-03-19 PROCEDURE — 99999 PR PBB SHADOW E&M-EST. PATIENT-LVL III: CPT | Mod: PBBFAC,,, | Performed by: INTERNAL MEDICINE

## 2020-03-19 PROCEDURE — 99204 OFFICE O/P NEW MOD 45 MIN: CPT | Mod: 25,S$PBB,, | Performed by: INTERNAL MEDICINE

## 2020-03-19 PROCEDURE — 99999 PR PBB SHADOW E&M-EST. PATIENT-LVL III: ICD-10-PCS | Mod: PBBFAC,,, | Performed by: INTERNAL MEDICINE

## 2020-03-19 PROCEDURE — 99204 PR OFFICE/OUTPT VISIT, NEW, LEVL IV, 45-59 MIN: ICD-10-PCS | Mod: 25,S$PBB,, | Performed by: INTERNAL MEDICINE

## 2020-03-19 PROCEDURE — 99213 OFFICE O/P EST LOW 20 MIN: CPT | Mod: PBBFAC,PN | Performed by: INTERNAL MEDICINE

## 2020-03-19 PROCEDURE — 93005 ELECTROCARDIOGRAM TRACING: CPT | Mod: PBBFAC,PN | Performed by: INTERNAL MEDICINE

## 2020-03-19 PROCEDURE — 93010 ELECTROCARDIOGRAM REPORT: CPT | Mod: S$PBB,,, | Performed by: INTERNAL MEDICINE

## 2020-03-19 PROCEDURE — 93010 EKG 12-LEAD: ICD-10-PCS | Mod: S$PBB,,, | Performed by: INTERNAL MEDICINE

## 2020-03-19 RX ORDER — DIAZEPAM 5 MG/1
TABLET ORAL
COMMUNITY
Start: 2020-03-06 | End: 2021-06-17 | Stop reason: CLARIF

## 2020-03-19 RX ORDER — CITALOPRAM 40 MG/1
TABLET, FILM COATED ORAL
COMMUNITY
Start: 2020-02-14 | End: 2020-03-19 | Stop reason: HOSPADM

## 2020-03-19 NOTE — PROGRESS NOTES
Subjective:    Patient ID:  Linh Schmidt is a 49 y.o. y.o. male who presents for initial visit for Establish Care      This is the 1st visit for this patient who has complaints of atypical chest pain and markedly elevated LDL.  The patient has been started on atorvastatin 40 mg by his primary care physician.  He describes the chest pain as associated with anxiety.      Past Medical History:   Diagnosis Date    Anxiety     GERD (gastroesophageal reflux disease)     Hypertension     Osteoarthritis         Social History     Socioeconomic History    Marital status:      Spouse name: Not on file    Number of children: Not on file    Years of education: Not on file    Highest education level: Not on file   Occupational History    Not on file   Social Needs    Financial resource strain: Not on file    Food insecurity:     Worry: Not on file     Inability: Not on file    Transportation needs:     Medical: Not on file     Non-medical: Not on file   Tobacco Use    Smoking status: Former Smoker    Smokeless tobacco: Never Used   Substance and Sexual Activity    Alcohol use: No    Drug use: No    Sexual activity: Yes     Partners: Female   Lifestyle    Physical activity:     Days per week: Not on file     Minutes per session: Not on file    Stress: Not on file   Relationships    Social connections:     Talks on phone: Not on file     Gets together: Not on file     Attends [employer] service: Not on file     Active member of club or organization: Not on file     Attends meetings of clubs or organizations: Not on file     Relationship status: Not on file   Other Topics Concern    Patient feels they ought to cut down on drinking/drug use No    Patient annoyed by others criticizing their drinking/drug use No    Patient has felt bad or guilty about drinking/drug use No    Patient has had a drink/used drugs as an eye opener in the AM No   Social History Narrative    Not on file        Family  History   Problem Relation Age of Onset    Mental illness Mother     Diabetes Maternal Grandmother     Heart disease Maternal Grandfather     Hyperlipidemia Maternal Grandfather     Stroke Maternal Grandfather         Review of Systems   Constitutional: Negative.    HENT: Negative.    Eyes: Negative.    Respiratory: Negative.    Cardiovascular: Positive for chest pain.        Tightness in chest occassionally   Gastrointestinal: Negative.    Genitourinary: Negative.    Musculoskeletal: Negative.    Skin: Negative.    Neurological: Negative.    Endo/Heme/Allergies: Negative.    Psychiatric/Behavioral: Negative.         Objective:     BP (!) 111/55 (BP Location: Left arm, Patient Position: Sitting, BP Method: Large (Automatic))   Pulse 74   Wt 91 kg (200 lb 9.9 oz)   SpO2 98%   BMI 29.63 kg/m²     Physical Exam   Constitutional: He is oriented to person, place, and time and well-developed, well-nourished, and in no distress.   HENT:   Head: Normocephalic and atraumatic.   Eyes: Pupils are equal, round, and reactive to light. No scleral icterus.   Neck: Normal range of motion. Neck supple. No thyromegaly present.   Cardiovascular: Normal rate and regular rhythm.   Pulmonary/Chest: Effort normal and breath sounds normal.   Abdominal: Soft. Bowel sounds are normal. He exhibits no distension and no mass. There is no tenderness.   Musculoskeletal: Normal range of motion. He exhibits no edema.   Lymphadenopathy:     He has no cervical adenopathy.   Neurological: He is alert and oriented to person, place, and time. No cranial nerve deficit.   Skin: Skin is warm and dry. No erythema.   Psychiatric: Memory, affect and judgment normal.       Labs:     Lab Results   Component Value Date     02/06/2020    K 3.7 02/06/2020    CL 96 (L) 02/06/2020    CO2 26 02/06/2020    BUN 10 02/06/2020    CREATININE 1.1 02/06/2020    ANIONGAP 15 02/06/2020     No results found for: HGBA1C  No results found for: BNP,  BNPTRIAGEBLO    Lab Results   Component Value Date    WBC 7.60 02/06/2020    HGB 16.5 02/06/2020    HCT 51.3 02/06/2020     02/06/2020    GRAN 5.3 02/06/2020    GRAN 70.1 02/06/2020     Lab Results   Component Value Date    CHOL 421 (H) 02/06/2020    HDL 25 (L) 02/06/2020    LDLCALC 329 (H) 02/06/2020    TRIG 333 (H) 02/06/2020         Results for orders placed or performed in visit on 02/26/20   EKG 12-lead    Collection Time: 02/26/20  4:41 PM    Narrative    Test Reason :     Vent. Rate : 067 BPM     Atrial Rate : 067 BPM     P-R Int : 172 ms          QRS Dur : 084 ms      QT Int : 370 ms       P-R-T Axes : 069 035 -07 degrees     QTc Int : 390 ms    Normal sinus rhythm  T wave abnormality, consider inferior ischemia  Abnormal ECG  No previous ECGs available  Confirmed by Ronald CARLIN, Javid LEWIS ([id number]) on 2/27/2020 9:45:54 AM    Referred By:             Confirmed By:Javid Cardenas MD        .  Meds:     Current Outpatient Medications:     atenolol (TENORMIN) 50 MG tablet, Take 1 tablet (50 mg total) by mouth 2 (two) times daily., Disp: 30 tablet, Rfl: 0    atenoloL (TENORMIN) 50 MG tablet, Take 1 tablet (50 mg total) by mouth 2 (two) times daily., Disp: 60 tablet, Rfl: 11    atorvastatin (LIPITOR) 40 MG tablet, Take 1 tablet (40 mg total) by mouth once daily., Disp: 90 tablet, Rfl: 3    butalbital-acetaminophen-caffeine -40 mg (FIORICET, ESGIC) -40 mg per tablet, TAKE 1 TABLET BY MOUTH EVERY 6 (SIX) HOURS AS NEEDED FOR PAIN OR FOR HEADACHE, Disp: 120 tablet, Rfl: 1    diazePAM (VALIUM) 5 MG tablet, TK 1 T PO QD PRN, Disp: , Rfl:     escitalopram oxalate (LEXAPRO) 20 MG tablet, Take 20 mg by mouth once daily., Disp: , Rfl:     testosterone cypionate (DEPOTESTOTERONE CYPIONATE) 200 mg/mL injection, INJECT 1 ML INTRAMUSCULARLY EVERY WEEK, Disp: , Rfl: 1      Assessment & Plan:     Chest pain  Patient has a markedly elevated LDL and atypical chest pain he should undergo SPECT myocardial  perfusion scan as well as an echocardiogram which have both been scheduled for him today.  He is also started on Co Q10 100 mg p.o. q.day.  I did dietary counseling with him in reference to his hyperlipidemia as well.    Hyperlipidemia  Markedly elevated LDL at 329.  Unlikely to respond to 40 mg of atorvastatin.  Patient also prescribe Co Q10.

## 2020-03-19 NOTE — ASSESSMENT & PLAN NOTE
Markedly elevated LDL at 329.  Unlikely to respond to 40 mg of atorvastatin.  Patient also prescribe Co Q10.

## 2020-03-19 NOTE — ASSESSMENT & PLAN NOTE
Patient has a markedly elevated LDL and atypical chest pain he should undergo SPECT myocardial perfusion scan as well as an echocardiogram which have both been scheduled for him today.  He is also started on Co Q10 100 mg p.o. q.day.  I did dietary counseling with him in reference to his hyperlipidemia as well.

## 2020-04-20 ENCOUNTER — TELEPHONE (OUTPATIENT)
Dept: CARDIOLOGY | Facility: CLINIC | Age: 50
End: 2020-04-20

## 2020-04-20 ENCOUNTER — PATIENT OUTREACH (OUTPATIENT)
Dept: ADMINISTRATIVE | Facility: OTHER | Age: 50
End: 2020-04-20

## 2020-04-20 NOTE — TELEPHONE ENCOUNTER
----- Message from Lilian Blanchard sent at 4/20/2020  4:11 PM CDT -----  Contact: pt   Returning call from Peggy Castillo calll

## 2020-05-21 ENCOUNTER — PATIENT MESSAGE (OUTPATIENT)
Dept: PHYSICAL MEDICINE AND REHAB | Facility: CLINIC | Age: 50
End: 2020-05-21

## 2020-05-21 ENCOUNTER — TELEPHONE (OUTPATIENT)
Dept: PHYSICAL MEDICINE AND REHAB | Facility: CLINIC | Age: 50
End: 2020-05-21

## 2020-05-21 NOTE — TELEPHONE ENCOUNTER
Called the patient to let him know his appointment was changed no answer Vm full unable to leave a message.  Sent the patient a message in my [facility]

## 2020-05-28 DIAGNOSIS — R51.9 HEADACHE, CHRONIC DAILY: ICD-10-CM

## 2020-05-28 NOTE — TELEPHONE ENCOUNTER
----- Message from Emily Tinajero sent at 5/28/2020  1:23 PM CDT -----  Contact: self  Pt need a refill on medication      butalbital-acetaminophen-caffeine -40 mg (FIORICET, ESGIC) -40 mg per tablet       Con tact info [phone]

## 2020-05-29 RX ORDER — BUTALBITAL, ACETAMINOPHEN AND CAFFEINE 50; 325; 40 MG/1; MG/1; MG/1
TABLET ORAL
Qty: 120 TABLET | Refills: 0 | Status: SHIPPED | OUTPATIENT
Start: 2020-05-29 | End: 2020-08-17 | Stop reason: SDUPTHER

## 2020-08-17 DIAGNOSIS — R51.9 HEADACHE, CHRONIC DAILY: ICD-10-CM

## 2020-08-17 NOTE — TELEPHONE ENCOUNTER
----- Message from Sara Rodgers sent at 8/17/2020  3:15 PM CDT -----  Regarding: refill  Contact: pt@ [phone]  Rx Refill/Request     Is this a Refill or New Rx: refill     Rx Name and Strength:  butalbital-acetaminophen-caffeine -40 mg (FIORICET, ESGIC) -40 mg per tablet    Preferred Pharmacy with phone number:     [facility] - [address]  [place] [address] [facility] [address]  Phone: [phone] Fax: [phone]    Communication Preference:pt @ [phone]  Additional Information:

## 2020-08-18 RX ORDER — BUTALBITAL, ACETAMINOPHEN AND CAFFEINE 50; 325; 40 MG/1; MG/1; MG/1
TABLET ORAL
Qty: 120 TABLET | Refills: 1 | Status: SHIPPED | OUTPATIENT
Start: 2020-08-18 | End: 2020-10-23 | Stop reason: SDUPTHER

## 2020-10-23 DIAGNOSIS — R51.9 HEADACHE, CHRONIC DAILY: ICD-10-CM

## 2020-10-23 NOTE — TELEPHONE ENCOUNTER
----- Message from Cuate Sheehan sent at 10/23/2020  4:28 PM CDT -----  Contact: @[phone]  Rx Refill/Request     Is this a Refill or New Rx:  yes   Rx Name and Strength:  butalbital-acetaminophen-caffeine -40 mg (FIORICET, ESGIC) -40 mg per tablet  Preferred Pharmacy with phone number:       [facility] - [address]  [address] [facility] [address]  Phone: [phone] Fax: [phone]

## 2020-10-24 RX ORDER — BUTALBITAL, ACETAMINOPHEN AND CAFFEINE 50; 325; 40 MG/1; MG/1; MG/1
TABLET ORAL
Qty: 120 TABLET | Refills: 0 | Status: SHIPPED | OUTPATIENT
Start: 2020-10-24 | End: 2020-12-05

## 2020-12-03 DIAGNOSIS — R51.9 HEADACHE, CHRONIC DAILY: ICD-10-CM

## 2020-12-05 RX ORDER — BUTALBITAL, ACETAMINOPHEN AND CAFFEINE 50; 325; 40 MG/1; MG/1; MG/1
TABLET ORAL
Qty: 120 TABLET | Refills: 0 | OUTPATIENT
Start: 2020-12-05

## 2020-12-11 ENCOUNTER — PATIENT MESSAGE (OUTPATIENT)
Dept: OTHER | Facility: OTHER | Age: 50
End: 2020-12-11

## 2021-01-19 DIAGNOSIS — R51.9 HEADACHE, CHRONIC DAILY: ICD-10-CM

## 2021-01-22 RX ORDER — BUTALBITAL, ACETAMINOPHEN AND CAFFEINE 50; 325; 40 MG/1; MG/1; MG/1
TABLET ORAL
Qty: 120 TABLET | Refills: 0 | Status: SHIPPED | OUTPATIENT
Start: 2021-01-22 | End: 2021-03-12 | Stop reason: SDUPTHER

## 2021-03-12 DIAGNOSIS — R51.9 HEADACHE, CHRONIC DAILY: ICD-10-CM

## 2021-03-15 RX ORDER — BUTALBITAL, ACETAMINOPHEN AND CAFFEINE 50; 325; 40 MG/1; MG/1; MG/1
TABLET ORAL
Qty: 120 TABLET | Refills: 0 | Status: SHIPPED | OUTPATIENT
Start: 2021-03-15 | End: 2021-04-29 | Stop reason: SDUPTHER

## 2021-04-05 ENCOUNTER — PATIENT MESSAGE (OUTPATIENT)
Dept: ADMINISTRATIVE | Facility: HOSPITAL | Age: 51
End: 2021-04-05

## 2021-04-26 ENCOUNTER — PATIENT MESSAGE (OUTPATIENT)
Dept: RESEARCH | Facility: HOSPITAL | Age: 51
End: 2021-04-26

## 2021-04-29 DIAGNOSIS — R51.9 HEADACHE, CHRONIC DAILY: ICD-10-CM

## 2021-05-02 RX ORDER — BUTALBITAL, ACETAMINOPHEN AND CAFFEINE 50; 325; 40 MG/1; MG/1; MG/1
TABLET ORAL
Qty: 120 TABLET | Refills: 0 | Status: SHIPPED | OUTPATIENT
Start: 2021-05-02 | End: 2021-06-11 | Stop reason: SDUPTHER

## 2021-06-11 DIAGNOSIS — R51.9 HEADACHE, CHRONIC DAILY: ICD-10-CM

## 2021-06-13 RX ORDER — BUTALBITAL, ACETAMINOPHEN AND CAFFEINE 50; 325; 40 MG/1; MG/1; MG/1
TABLET ORAL
Qty: 120 TABLET | Refills: 0 | Status: SHIPPED | OUTPATIENT
Start: 2021-06-13 | End: 2021-10-18 | Stop reason: SDUPTHER

## 2021-07-06 ENCOUNTER — PATIENT MESSAGE (OUTPATIENT)
Dept: ADMINISTRATIVE | Facility: HOSPITAL | Age: 51
End: 2021-07-06

## 2021-10-05 ENCOUNTER — PATIENT MESSAGE (OUTPATIENT)
Dept: ADMINISTRATIVE | Facility: HOSPITAL | Age: 51
End: 2021-10-05

## 2021-10-18 DIAGNOSIS — R51.9 HEADACHE, CHRONIC DAILY: ICD-10-CM

## 2021-10-19 RX ORDER — BUTALBITAL, ACETAMINOPHEN AND CAFFEINE 50; 325; 40 MG/1; MG/1; MG/1
TABLET ORAL
Qty: 120 TABLET | Refills: 0 | Status: SHIPPED | OUTPATIENT
Start: 2021-10-19 | End: 2022-01-13 | Stop reason: SDUPTHER

## 2021-12-20 ENCOUNTER — PATIENT MESSAGE (OUTPATIENT)
Dept: ADMINISTRATIVE | Facility: OTHER | Age: 51
End: 2021-12-20
Payer: MEDICAID

## 2022-01-13 DIAGNOSIS — R51.9 HEADACHE, CHRONIC DAILY: ICD-10-CM

## 2022-01-13 RX ORDER — BUTALBITAL, ACETAMINOPHEN AND CAFFEINE 50; 325; 40 MG/1; MG/1; MG/1
TABLET ORAL
Qty: 120 TABLET | Refills: 0 | Status: SHIPPED | OUTPATIENT
Start: 2022-01-13 | End: 2022-04-01 | Stop reason: SDUPTHER

## 2022-01-13 NOTE — TELEPHONE ENCOUNTER
----- Message from Laureano Roa sent at 1/13/2022  1:43 PM CST -----  Regarding: Refill  Contact: TRACI MORGAN [[id number]]      Can the clinic reply in [facility]: yes      Please refill the medication(s) listed below. Please call the patient when the prescription(s) is ready for  at this phone number   [phone]      Medication #1 butalbital-acetaminophen-caffeine -40 mg (FIORICET, ESGIC) -40 mg per tablet    Preferred Pharmacy: [facility] [address]

## 2022-01-21 ENCOUNTER — PATIENT MESSAGE (OUTPATIENT)
Dept: ADMINISTRATIVE | Facility: HOSPITAL | Age: 52
End: 2022-01-21
Payer: MEDICAID

## 2022-04-01 DIAGNOSIS — R51.9 HEADACHE, CHRONIC DAILY: ICD-10-CM

## 2022-04-01 NOTE — TELEPHONE ENCOUNTER
----- Message from Jojo Ramirez sent at 4/1/2022 12:52 PM CDT -----  Regarding: Refill  Can the clinic reply in [facility]: No           Please refill the medication(s) listed below. Please call the patient when the prescription(s) is ready for  at this phone number   [phone]           Medication #1 butalbital-acetaminophen-caffeine -40 mg (FIORICET, ESGIC) -40 mg per tablet         Medication #2            Preferred Pharmacy: [facility] [address]

## 2022-04-01 NOTE — TELEPHONE ENCOUNTER
Last Rx refill-----01/13/22  Last office visit--04/08/19    [facility]

## 2022-04-02 RX ORDER — BUTALBITAL, ACETAMINOPHEN AND CAFFEINE 50; 325; 40 MG/1; MG/1; MG/1
TABLET ORAL
Qty: 120 TABLET | Refills: 0 | Status: SHIPPED | OUTPATIENT
Start: 2022-04-02

## 2022-05-02 DIAGNOSIS — I10 ESSENTIAL HYPERTENSION: ICD-10-CM

## 2022-05-02 DIAGNOSIS — R00.0 TACHYCARDIA: ICD-10-CM

## 2022-05-02 NOTE — TELEPHONE ENCOUNTER
No new care gaps identified.  Powered by [employer]. Reference number: [id number].   5/02/2022 4:28:29 PM CDT

## 2022-05-03 RX ORDER — ATENOLOL 50 MG/1
TABLET ORAL
Qty: 60 TABLET | Refills: 1 | Status: SHIPPED | OUTPATIENT
Start: 2022-05-03

## 2022-05-03 NOTE — TELEPHONE ENCOUNTER
Refill Routing Note   Medication(s) are not appropriate for processing by [facility] for the following reason(s):      - Patient has not been seen in over 15 months by PCP  - Required vitals are abnormal  - Patient has been seen in the ED/Hospital since the last PCP visit    [facility] action(s):  Route          Medication reconciliation completed: No     Appointments  past 12m or future 3m with PCP    Date Provider   Last Visit   2/26/2020 Mark Haider MD   Next Visit   Visit date not found Mark Haider MD   ED visits in past 90 days: 0        Note composed:8:41 AM 05/03/2022

## 2023-01-11 ENCOUNTER — TELEPHONE (OUTPATIENT)
Dept: PRIMARY CARE CLINIC | Facility: CLINIC | Age: 53
End: 2023-01-11
Payer: MEDICAID

## 2023-01-11 NOTE — TELEPHONE ENCOUNTER
Called patient wife to inform her that Dr. Haider does not have any available slots. No answer. Couldn't leave message due to voicemail not being set up.

## 2023-01-11 NOTE — TELEPHONE ENCOUNTER
----- Message from Sondra Boudreaux sent at 1/11/2023 11:22 AM CST -----  Contact: pt's wife Sera [phone]  Sera is calling in regards to scheduling an appt for pt for stomach issues. The first available appt is 04/04. Pt would like to be seen sooner. Please call to schedule.                              Thank you

## 2023-01-20 PROBLEM — R16.0 HEPATOMEGALY: Status: ACTIVE | Noted: 2023-01-20

## 2023-01-20 PROBLEM — K59.00 CONSTIPATION: Status: ACTIVE | Noted: 2023-01-20

## 2023-02-16 ENCOUNTER — TELEPHONE (OUTPATIENT)
Dept: HEMATOLOGY/ONCOLOGY | Facility: CLINIC | Age: 53
End: 2023-02-16
Payer: MEDICAID

## 2023-02-16 ENCOUNTER — HOSPITAL ENCOUNTER (EMERGENCY)
Facility: HOSPITAL | Age: 53
Discharge: HOME | End: 2023-02-16
Attending: EMERGENCY MEDICINE
Payer: MEDICAID

## 2023-02-16 ENCOUNTER — PROCEDURE VISIT (OUTPATIENT)
Dept: HEMATOLOGY/ONCOLOGY | Facility: CLINIC | Age: 53
End: 2023-02-16
Payer: MEDICAID

## 2023-02-16 VITALS
SYSTOLIC BLOOD PRESSURE: 122 MMHG | OXYGEN SATURATION: 97 % | DIASTOLIC BLOOD PRESSURE: 63 MMHG | RESPIRATION RATE: 18 BRPM | HEART RATE: 65 BPM

## 2023-02-16 VITALS
SYSTOLIC BLOOD PRESSURE: 113 MMHG | HEART RATE: 58 BPM | DIASTOLIC BLOOD PRESSURE: 59 MMHG | TEMPERATURE: 99 F | OXYGEN SATURATION: 95 % | RESPIRATION RATE: 16 BRPM

## 2023-02-16 DIAGNOSIS — C92.10 BLAST CRISIS PHASE OF CHRONIC MYELOID LEUKEMIA: Primary | ICD-10-CM

## 2023-02-16 DIAGNOSIS — D72.829 LEUKOCYTOSIS, UNSPECIFIED TYPE: Primary | ICD-10-CM

## 2023-02-16 DIAGNOSIS — C92.00 ACUTE MYELOID LEUKEMIA NOT HAVING ACHIEVED REMISSION: ICD-10-CM

## 2023-02-16 LAB
ALBUMIN SERPL BCP-MCNC: 3.6 G/DL (ref 3.5–5.2)
ALP SERPL-CCNC: 97 U/L (ref 55–135)
ALT SERPL W/O P-5'-P-CCNC: 32 U/L (ref 10–44)
ANION GAP SERPL CALC-SCNC: 11 MMOL/L (ref 8–16)
ANISOCYTOSIS BLD QL SMEAR: SLIGHT
AST SERPL-CCNC: 34 U/L (ref 10–40)
BASOPHILS # BLD AUTO: ABNORMAL K/UL (ref 0–0.2)
BASOPHILS NFR BLD: 2 % (ref 0–1.9)
BILIRUB SERPL-MCNC: 0.6 MG/DL (ref 0.1–1)
BUN SERPL-MCNC: 18 MG/DL (ref 6–20)
CALCIUM SERPL-MCNC: 9.5 MG/DL (ref 8.7–10.5)
CHLORIDE SERPL-SCNC: 104 MMOL/L (ref 95–110)
CO2 SERPL-SCNC: 22 MMOL/L (ref 23–29)
CREAT SERPL-MCNC: 1.3 MG/DL (ref 0.5–1.4)
DACRYOCYTES BLD QL SMEAR: ABNORMAL
DIFFERENTIAL METHOD: ABNORMAL
EOSINOPHIL # BLD AUTO: ABNORMAL K/UL (ref 0–0.5)
EOSINOPHIL NFR BLD: 1 % (ref 0–8)
ERYTHROCYTE [DISTWIDTH] IN BLOOD BY AUTOMATED COUNT: 18.3 % (ref 11.5–14.5)
EST. GFR  (NO RACE VARIABLE): >60 ML/MIN/1.73 M^2
GLUCOSE SERPL-MCNC: 112 MG/DL (ref 70–110)
HCT VFR BLD AUTO: 34.6 % (ref 40–54)
HGB BLD-MCNC: 10.9 G/DL (ref 14–18)
HYPOCHROMIA BLD QL SMEAR: ABNORMAL
IMM GRANULOCYTES # BLD AUTO: ABNORMAL K/UL (ref 0–0.04)
IMM GRANULOCYTES NFR BLD AUTO: ABNORMAL % (ref 0–0.5)
LYMPHOCYTES # BLD AUTO: ABNORMAL K/UL (ref 1–4.8)
LYMPHOCYTES NFR BLD: 2 % (ref 18–48)
MCH RBC QN AUTO: 29.6 PG (ref 27–31)
MCHC RBC AUTO-ENTMCNC: 31.5 G/DL (ref 32–36)
MCV RBC AUTO: 94 FL (ref 82–98)
MONOCYTES # BLD AUTO: ABNORMAL K/UL (ref 0.3–1)
MONOCYTES NFR BLD: 1 % (ref 4–15)
MYELOCYTES NFR BLD MANUAL: 8 %
NEUTROPHILS NFR BLD: 64 % (ref 38–73)
NEUTS BAND NFR BLD MANUAL: 16 %
NRBC BLD-RTO: 1 /100 WBC
OVALOCYTES BLD QL SMEAR: ABNORMAL
PLATELET # BLD AUTO: 140 K/UL (ref 150–450)
PMV BLD AUTO: 10.6 FL (ref 9.2–12.9)
POIKILOCYTOSIS BLD QL SMEAR: SLIGHT
POLYCHROMASIA BLD QL SMEAR: ABNORMAL
POTASSIUM SERPL-SCNC: 3.8 MMOL/L (ref 3.5–5.1)
PROMYELOCYTES NFR BLD MANUAL: 3 %
PROT SERPL-MCNC: 6.9 G/DL (ref 6–8.4)
RBC # BLD AUTO: 3.68 M/UL (ref 4.6–6.2)
SARS-COV-2 RDRP RESP QL NAA+PROBE: NEGATIVE
SODIUM SERPL-SCNC: 137 MMOL/L (ref 136–145)
SPHEROCYTES BLD QL SMEAR: ABNORMAL
WBC # BLD AUTO: 297.1 K/UL (ref 3.9–12.7)
WBC OTHER NFR BLD MANUAL: 3 %

## 2023-02-16 PROCEDURE — 88311 DECALCIFY TISSUE: CPT | Mod: 26,,, | Performed by: PATHOLOGY

## 2023-02-16 PROCEDURE — 88189 FLOWCYTOMETRY/READ 16 & >: CPT | Mod: ,,, | Performed by: PATHOLOGY

## 2023-02-16 PROCEDURE — 88313 SPECIAL STAINS GROUP 2: CPT | Mod: 26,,, | Performed by: PATHOLOGY

## 2023-02-16 PROCEDURE — 88313 PR  SPECIAL STAINS,GROUP II: ICD-10-PCS | Mod: 26,,, | Performed by: PATHOLOGY

## 2023-02-16 PROCEDURE — 81207 BCR/ABL1 GENE MINOR BP: CPT | Performed by: NURSE PRACTITIONER

## 2023-02-16 PROCEDURE — 88185 FLOWCYTOMETRY/TC ADD-ON: CPT | Mod: 59 | Performed by: PATHOLOGY

## 2023-02-16 PROCEDURE — 88305 TISSUE EXAM BY PATHOLOGIST: ICD-10-PCS | Mod: 26,,, | Performed by: PATHOLOGY

## 2023-02-16 PROCEDURE — 38222 DX BONE MARROW BX & ASPIR: CPT | Mod: S$PBB,LT,, | Performed by: NURSE PRACTITIONER

## 2023-02-16 PROCEDURE — 88342 IMHCHEM/IMCYTCHM 1ST ANTB: CPT | Mod: 59 | Performed by: PATHOLOGY

## 2023-02-16 PROCEDURE — 88342 CHG IMMUNOCYTOCHEMISTRY: ICD-10-PCS | Mod: 26,59,, | Performed by: PATHOLOGY

## 2023-02-16 PROCEDURE — 88311 DECALCIFY TISSUE: CPT | Performed by: PATHOLOGY

## 2023-02-16 PROCEDURE — 99283 EMERGENCY DEPT VISIT LOW MDM: CPT | Mod: 25

## 2023-02-16 PROCEDURE — 99285 PR EMERGENCY DEPT VISIT,LEVEL V: ICD-10-PCS | Mod: CS,,, | Performed by: EMERGENCY MEDICINE

## 2023-02-16 PROCEDURE — 36415 COLL VENOUS BLD VENIPUNCTURE: CPT | Performed by: NURSE PRACTITIONER

## 2023-02-16 PROCEDURE — 80053 COMPREHEN METABOLIC PANEL: CPT | Performed by: EMERGENCY MEDICINE

## 2023-02-16 PROCEDURE — 85027 COMPLETE CBC AUTOMATED: CPT | Performed by: EMERGENCY MEDICINE

## 2023-02-16 PROCEDURE — 88299 UNLISTED CYTOGENETIC STUDY: CPT | Performed by: NURSE PRACTITIONER

## 2023-02-16 PROCEDURE — 88305 TISSUE EXAM BY PATHOLOGIST: CPT | Performed by: PATHOLOGY

## 2023-02-16 PROCEDURE — 88184 FLOWCYTOMETRY/ TC 1 MARKER: CPT | Performed by: PATHOLOGY

## 2023-02-16 PROCEDURE — 85007 BL SMEAR W/DIFF WBC COUNT: CPT | Performed by: EMERGENCY MEDICINE

## 2023-02-16 PROCEDURE — 88264 CHROMOSOME ANALYSIS 20-25: CPT | Performed by: NURSE PRACTITIONER

## 2023-02-16 PROCEDURE — 81208 BCR/ABL1 GENE OTHER BP: CPT | Mod: 59 | Performed by: NURSE PRACTITIONER

## 2023-02-16 PROCEDURE — 85097 BONE MARROW INTERPRETATION: CPT | Mod: ,,, | Performed by: PATHOLOGY

## 2023-02-16 PROCEDURE — 88341 IMHCHEM/IMCYTCHM EA ADD ANTB: CPT | Performed by: PATHOLOGY

## 2023-02-16 PROCEDURE — 85097 PR  BONE MARROW,SMEAR INTERPRETATION: ICD-10-PCS | Mod: ,,, | Performed by: PATHOLOGY

## 2023-02-16 PROCEDURE — 88313 SPECIAL STAINS GROUP 2: CPT | Mod: 59 | Performed by: PATHOLOGY

## 2023-02-16 PROCEDURE — 99285 EMERGENCY DEPT VISIT HI MDM: CPT | Mod: CS,,, | Performed by: EMERGENCY MEDICINE

## 2023-02-16 PROCEDURE — 88341 PR IHC OR ICC EACH ADD'L SINGLE ANTIBODY  STAINPR: ICD-10-PCS | Mod: 26,,, | Performed by: PATHOLOGY

## 2023-02-16 PROCEDURE — 81206 BCR/ABL1 GENE MAJOR BP: CPT

## 2023-02-16 PROCEDURE — 88311 PR  DECALCIFY TISSUE: ICD-10-PCS | Mod: 26,,, | Performed by: PATHOLOGY

## 2023-02-16 PROCEDURE — 88237 TISSUE CULTURE BONE MARROW: CPT | Performed by: NURSE PRACTITIONER

## 2023-02-16 PROCEDURE — U0002 COVID-19 LAB TEST NON-CDC: HCPCS | Performed by: EMERGENCY MEDICINE

## 2023-02-16 PROCEDURE — 38222 PR BONE MARROW BIOPSY(IES) W/ASPIRATION(S); DIAGNOSTIC: ICD-10-PCS | Mod: S$PBB,LT,, | Performed by: NURSE PRACTITIONER

## 2023-02-16 PROCEDURE — 88189 PR  FLOWCYTOMETRY/READ, 16 & > MARKERS: ICD-10-PCS | Mod: ,,, | Performed by: PATHOLOGY

## 2023-02-16 PROCEDURE — 88341 IMHCHEM/IMCYTCHM EA ADD ANTB: CPT | Mod: 26,,, | Performed by: PATHOLOGY

## 2023-02-16 PROCEDURE — 88305 TISSUE EXAM BY PATHOLOGIST: CPT | Mod: 26,,, | Performed by: PATHOLOGY

## 2023-02-16 PROCEDURE — 88342 IMHCHEM/IMCYTCHM 1ST ANTB: CPT | Mod: 26,59,, | Performed by: PATHOLOGY

## 2023-02-16 PROCEDURE — 81450 HL NEO GSAP 5-50DNA/DNA&RNA: CPT | Performed by: NURSE PRACTITIONER

## 2023-02-16 RX ORDER — LIDOCAINE HYDROCHLORIDE 20 MG/ML
10 INJECTION, SOLUTION EPIDURAL; INFILTRATION; INTRACAUDAL; PERINEURAL ONCE
Status: COMPLETED | OUTPATIENT
Start: 2023-02-16 | End: 2023-02-16

## 2023-02-16 RX ORDER — HYDROXYUREA 500 MG/1
1500 CAPSULE ORAL 2 TIMES DAILY
Qty: 180 CAPSULE | Refills: 0 | Status: SHIPPED | OUTPATIENT
Start: 2023-02-16 | End: 2023-03-18

## 2023-02-16 RX ADMIN — LIDOCAINE HYDROCHLORIDE 7 ML: 20 INJECTION, SOLUTION EPIDURAL; INFILTRATION; INTRACAUDAL; PERINEURAL at 12:02

## 2023-02-16 NOTE — DISCHARGE INSTRUCTIONS
The oncology team will call you to schedule a bone marrow biopsy.  Call them Monday if you have not heard from them.    Return to the ER for any issues

## 2023-02-16 NOTE — PROCEDURES
Bone marrow    Date/Time: 2/16/2023 12:00 PM  Performed by: Naty Rios NP  Authorized by: Naty Rios NP     Aspiration?: Yes   Biopsy?: Yes    PROCEDURE NOTE:  Date of Procedure: 02/16/2023   Bone Marrow Biopsy and Aspiration  Indication: CML   Consent: Informed consent was obtained from patient.  Timeout: Done and documented.  Position: Prone  Site: Left posterior illiac crest.  Prep: Betadine.  Needle used: 11 gauge Jamshidi needle.  Anesthetic: 2% lidocaine 7 cc.  Biopsy: The biopsy needle was introduced into the marrow cavity and an aspirate was obtained without complications and sent for flow cytometry,NGS, DNA hold and cytogenetics. Core biopsy obtained without difficulty and sent for routine histologic examination.  Complications: None.  Disposition: Left patient with primary nurse,instructed to lay on back for 20 minutes. Advised not to take shower and keep dressing clean, dry & intact for 24 hours.  Blood loss: Minimal.     Naty Rios NP  Hematology/Oncology/BMT

## 2023-02-16 NOTE — PROVIDER PROGRESS NOTES - EMERGENCY DEPT.
Encounter Date: 2/16/2023    ED Physician Progress Notes        Physician Note:   BMT states that patient can go home on hydroxyurea 1500 mg b.i.d..  They will schedule him for an outpatient bone marrow biopsy within the next 7 days.  Patient is comfortable with this plan.

## 2023-02-16 NOTE — ED NOTES
Took report from Hamzah JOAQUIN, and assumed care of pt at this time. Pt resting comfortably and independently repositioned in stretcher with bed locked in lowest position for safety. NAD noted at this time. Respirations even and unlabored and visible chest rise noted.  Patient offered bathroom assistance and denies need at this time. Pt instructed to call if assistance is needed. Call light within reach. No needs at this time. Will continue to monitor.

## 2023-02-16 NOTE — PROCEDURES
Patient with CML blast phase presented at BMT clinic for diagnostic  bone marrow biopsy. Tolerated procedure well. Not in any distress.  Patient willl see Dr.Ruby Underwood for f/u. Reviewed medications, allergies and labs.     Naty Rios NP  Hematology/Oncology/BMT

## 2023-02-16 NOTE — ED PROVIDER NOTES
History:  Linh Schmidt is a 52 y.o. male who presents to the ED with Flank Pain (L sided flank pain x2 days. Was seen for same c/o and had ultrasound and labs drawn. Pt states NP called today and advised pt come to ED for extremely high WBC count and possible leukemia. )    Described as 53yo M with a history of hypertension presenting to the emergency department with a recent diagnosis of leukemia with blast crisis.  He reports left-sided flank and left upper quadrant abdominal pain for the past a week, was seen in the ER as well as by his physician where blood work revealed elevated white blood cell count concerning for leukemia.  He was supposed to be transferred to [facility] 2 days ago, however there were no beds so he was discharged and instructed to go to [facility] in the morning.  He waited a day and is presenting today.  He denies any fevers or chills, though does report his child has strep throat currently and he felt as if he was fighting something.  He denies any sore throat.    Review of Systems: All systems reviewed and are negative except as per history of present illness.    Medications:   Previous Medications    ATENOLOL (TENORMIN) 50 MG TABLET    TAKE 1 TABLET BY MOUTH 2 TIMES DAILY.    ATORVASTATIN (LIPITOR) 40 MG TABLET    Take 1 tablet (40 mg total) by mouth once daily.    BUTALBITAL-ACETAMINOPHEN-CAFFEINE -40 MG (FIORICET, ESGIC) -40 MG PER TABLET    TAKE 1 TABLET BY MOUTH EVERY 6 HOURS AS NEEDED FOR PAIN OR FOR HEADACHE    CITALOPRAM (CELEXA) 40 MG TABLET    Take 40 mg by mouth once daily.    TAMSULOSIN (FLOMAX) 0.4 MG CAP    Take 1 capsule (0.4 mg total) by mouth once daily.    TESTOSTERONE CYPIONATE (DEPOTESTOTERONE CYPIONATE) 200 MG/ML INJECTION    INJECT 1 ML INTRAMUSCULARLY EVERY WEEK       PMH:   Past Medical History:   Diagnosis Date    Anxiety     GERD (gastroesophageal reflux disease)     Hypertension     Osteoarthritis      PSH:   Past Surgical History:   Procedure Laterality Date     dental implants       Allergies: He has No Known Allergies.  Social History: Marital Status: . He  reports that he has quit smoking. He has never used smokeless tobacco.. He  reports no history of alcohol use..       Exam:  VITAL SIGNS:   Vitals:    02/16/23 0522   BP: 133/60   BP Location: Right arm   Patient Position: Sitting   Pulse: 66   Resp: 18   Temp: 98.2 °F (36.8 °C)   TempSrc: Oral   SpO2: 99%     Const: Awake and alert, NAD   Head: Atraumatic  Eyes: Normal Conjunctiva  ENT: Normal External Ears, Nose and Mouth.  Neck: Full range of motion. No meningismus.  Resp: Normal respiratory effort, No distress  Cardio: Equal and intact distal pulses  Abd: Soft, non tender, non distended. +splenomegaly   Skin: No petechiae or rashes  Ext: No cyanosis, or edema  Neur: Awake and alert  Psych: Normal Mood and Affect    Data:  Results for orders placed or performed during the hospital encounter of 02/16/23   CBC auto differential   Result Value Ref Range    .10 (HH) 3.90 - 12.70 K/uL    RBC 3.68 (L) 4.60 - 6.20 M/uL    Hemoglobin 10.9 (L) 14.0 - 18.0 g/dL    Hematocrit 34.6 (L) 40.0 - 54.0 %    MCV 94 82 - 98 fL    MCH 29.6 27.0 - 31.0 pg    MCHC 31.5 (L) 32.0 - 36.0 g/dL    RDW 18.3 (H) 11.5 - 14.5 %    Platelets 140 (L) 150 - 450 K/uL    MPV 10.6 9.2 - 12.9 fL   Comprehensive metabolic panel   Result Value Ref Range    Sodium 137 136 - 145 mmol/L    Potassium 3.8 3.5 - 5.1 mmol/L    Chloride 104 95 - 110 mmol/L    CO2 22 (L) 23 - 29 mmol/L    Glucose 112 (H) 70 - 110 mg/dL    BUN 18 6 - 20 mg/dL    Creatinine 1.3 0.5 - 1.4 mg/dL    Calcium 9.5 8.7 - 10.5 mg/dL    Total Protein 6.9 6.0 - 8.4 g/dL    Albumin 3.6 3.5 - 5.2 g/dL    Total Bilirubin 0.6 0.1 - 1.0 mg/dL    Alkaline Phosphatase 97 55 - 135 U/L    AST 34 10 - 40 U/L    ALT 32 10 - 44 U/L    Anion Gap 11 8 - 16 mmol/L    eGFR >60.0 >60 mL/min/1.73 m^2   COVID-19 Rapid Screening   Result Value Ref Range    SARS-CoV-2 RNA, Amplification, Qual  Negative Negative       Labs & Imaging studies were reviewed independently by me.     Medical Decision Makin-year-old male presenting to the emergency department with recent diagnosis of leukemia and concern for blast crisis.  White blood cell count severely elevated.  Patient with anemia and thrombocytopenia.  Patient asymptomatic at this time.  BMT consulted, will admit the patient to their service.     Clinical Impression:  1. 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

## 2023-02-16 NOTE — TELEPHONE ENCOUNTER
----- Message from Pattie Manrique RN sent at 2/16/2023  8:26 AM CST -----  Regarding: FW: New Patient Being Referred:    ----- Message -----  From: Maribeth Adams  Sent: 2/16/2023   8:23 AM CST  To: [facility]  Subject: New Patient Being Referred:                      Mr. Schmidt is calling to schedule a NP appt.       Diagnosis: Blast crisis phase of chronic myeloid leukemia      Date of Diagnosis:2/16/23      Referring Physician:Nuha Burrell MD      Previous treating Oncologist: no        Mr. Schmidt can be reached at  [phone]         regarding this message.

## 2023-02-17 LAB
BODY SITE - BONE MARROW: NORMAL
CLINICAL DIAGNOSIS - BONE MARROW: NORMAL
FLOW CYTOMETRY ANTIBODIES ANALYZED - BONE MARROW: NORMAL
FLOW CYTOMETRY COMMENT - BONE MARROW: NORMAL
FLOW CYTOMETRY INTERPRETATION - BONE MARROW: NORMAL

## 2023-02-18 LAB
DNA/RNA EXTRACT AND HOLD RESULT: NORMAL
DNA/RNA EXTRACTION: NORMAL
EXHR SPECIMEN TYPE: NORMAL

## 2023-02-23 ENCOUNTER — DOCUMENTATION ONLY (OUTPATIENT)
Dept: HEMATOLOGY/ONCOLOGY | Facility: CLINIC | Age: 53
End: 2023-02-23
Payer: MEDICAID

## 2023-02-23 ENCOUNTER — LAB VISIT (OUTPATIENT)
Dept: LAB | Facility: HOSPITAL | Age: 53
End: 2023-02-23
Attending: INTERNAL MEDICINE
Payer: MEDICAID

## 2023-02-23 ENCOUNTER — OFFICE VISIT (OUTPATIENT)
Dept: HEMATOLOGY/ONCOLOGY | Facility: CLINIC | Age: 53
End: 2023-02-23
Payer: MEDICAID

## 2023-02-23 VITALS
BODY MASS INDEX: 27.81 KG/M2 | WEIGHT: 187.75 LBS | TEMPERATURE: 98 F | RESPIRATION RATE: 16 BRPM | DIASTOLIC BLOOD PRESSURE: 69 MMHG | OXYGEN SATURATION: 98 % | HEIGHT: 69 IN | HEART RATE: 65 BPM | SYSTOLIC BLOOD PRESSURE: 113 MMHG

## 2023-02-23 DIAGNOSIS — C92.10 CHRONIC MYELOID LEUKEMIA: ICD-10-CM

## 2023-02-23 DIAGNOSIS — C92.10 CHRONIC MYELOID LEUKEMIA: Primary | ICD-10-CM

## 2023-02-23 LAB
ALBUMIN SERPL BCP-MCNC: 3.5 G/DL (ref 3.5–5.2)
ALP SERPL-CCNC: 77 U/L (ref 55–135)
ALT SERPL W/O P-5'-P-CCNC: 33 U/L (ref 10–44)
ANION GAP SERPL CALC-SCNC: 9 MMOL/L (ref 8–16)
ANISOCYTOSIS BLD QL SMEAR: SLIGHT
AST SERPL-CCNC: 28 U/L (ref 10–40)
BASOPHILS NFR BLD: 2 % (ref 0–1.9)
BILIRUB SERPL-MCNC: 0.6 MG/DL (ref 0.1–1)
BUN SERPL-MCNC: 18 MG/DL (ref 6–20)
CALCIUM SERPL-MCNC: 8.9 MG/DL (ref 8.7–10.5)
CHLORIDE SERPL-SCNC: 105 MMOL/L (ref 95–110)
CO2 SERPL-SCNC: 24 MMOL/L (ref 23–29)
CREAT SERPL-MCNC: 1.1 MG/DL (ref 0.5–1.4)
DIFFERENTIAL METHOD: ABNORMAL
EOSINOPHIL NFR BLD: 1 % (ref 0–8)
ERYTHROCYTE [DISTWIDTH] IN BLOOD BY AUTOMATED COUNT: 18.6 % (ref 11.5–14.5)
EST. GFR  (NO RACE VARIABLE): >60 ML/MIN/1.73 M^2
GLUCOSE SERPL-MCNC: 89 MG/DL (ref 70–110)
HCT VFR BLD AUTO: 34.5 % (ref 40–54)
HGB BLD-MCNC: 10.6 G/DL (ref 14–18)
IMM GRANULOCYTES # BLD AUTO: ABNORMAL K/UL (ref 0–0.04)
IMM GRANULOCYTES NFR BLD AUTO: ABNORMAL % (ref 0–0.5)
LYMPHOCYTES NFR BLD: 0 % (ref 18–48)
MCH RBC QN AUTO: 29 PG (ref 27–31)
MCHC RBC AUTO-ENTMCNC: 30.7 G/DL (ref 32–36)
MCV RBC AUTO: 95 FL (ref 82–98)
METAMYELOCYTES NFR BLD MANUAL: 11 %
MONOCYTES NFR BLD: 2 % (ref 4–15)
MYELOCYTES NFR BLD MANUAL: 11 %
NEUTROPHILS NFR BLD: 48 % (ref 38–73)
NEUTS BAND NFR BLD MANUAL: 12 %
NRBC BLD-RTO: 2 /100 WBC
PHOSPHATE SERPL-MCNC: 3.8 MG/DL (ref 2.7–4.5)
PLATELET # BLD AUTO: 136 K/UL (ref 150–450)
PLATELET BLD QL SMEAR: ABNORMAL
PMV BLD AUTO: 11.4 FL (ref 9.2–12.9)
POLYCHROMASIA BLD QL SMEAR: ABNORMAL
POTASSIUM SERPL-SCNC: 4.3 MMOL/L (ref 3.5–5.1)
PROMYELOCYTES NFR BLD MANUAL: 5 %
PROT SERPL-MCNC: 6.7 G/DL (ref 6–8.4)
RBC # BLD AUTO: 3.65 M/UL (ref 4.6–6.2)
SCHISTOCYTES BLD QL SMEAR: ABNORMAL
SMUDGE CELLS BLD QL SMEAR: PRESENT
SODIUM SERPL-SCNC: 138 MMOL/L (ref 136–145)
URATE SERPL-MCNC: 9.1 MG/DL (ref 3.4–7)
WBC # BLD AUTO: 151.4 K/UL (ref 3.9–12.7)
WBC OTHER NFR BLD MANUAL: 8 %

## 2023-02-23 PROCEDURE — 1159F PR MEDICATION LIST DOCUMENTED IN MEDICAL RECORD: ICD-10-PCS | Mod: CPTII,,, | Performed by: INTERNAL MEDICINE

## 2023-02-23 PROCEDURE — 3008F PR BODY MASS INDEX (BMI) DOCUMENTED: ICD-10-PCS | Mod: CPTII,,, | Performed by: INTERNAL MEDICINE

## 2023-02-23 PROCEDURE — 99999 PR PBB SHADOW E&M-EST. PATIENT-LVL III: ICD-10-PCS | Mod: PBBFAC,,, | Performed by: INTERNAL MEDICINE

## 2023-02-23 PROCEDURE — 1159F MED LIST DOCD IN RCRD: CPT | Mod: CPTII,,, | Performed by: INTERNAL MEDICINE

## 2023-02-23 PROCEDURE — 84550 ASSAY OF BLOOD/URIC ACID: CPT | Performed by: INTERNAL MEDICINE

## 2023-02-23 PROCEDURE — 84100 ASSAY OF PHOSPHORUS: CPT | Performed by: INTERNAL MEDICINE

## 2023-02-23 PROCEDURE — 3008F BODY MASS INDEX DOCD: CPT | Mod: CPTII,,, | Performed by: INTERNAL MEDICINE

## 2023-02-23 PROCEDURE — 99999 PR PBB SHADOW E&M-EST. PATIENT-LVL III: CPT | Mod: PBBFAC,,, | Performed by: INTERNAL MEDICINE

## 2023-02-23 PROCEDURE — 3074F PR MOST RECENT SYSTOLIC BLOOD PRESSURE < 130 MM HG: ICD-10-PCS | Mod: CPTII,,, | Performed by: INTERNAL MEDICINE

## 2023-02-23 PROCEDURE — 99214 PR OFFICE/OUTPT VISIT, EST, LEVL IV, 30-39 MIN: ICD-10-PCS | Mod: S$PBB,,, | Performed by: INTERNAL MEDICINE

## 2023-02-23 PROCEDURE — 1160F PR REVIEW ALL MEDS BY PRESCRIBER/CLIN PHARMACIST DOCUMENTED: ICD-10-PCS | Mod: CPTII,,, | Performed by: INTERNAL MEDICINE

## 2023-02-23 PROCEDURE — 36415 COLL VENOUS BLD VENIPUNCTURE: CPT | Performed by: INTERNAL MEDICINE

## 2023-02-23 PROCEDURE — 3074F SYST BP LT 130 MM HG: CPT | Mod: CPTII,,, | Performed by: INTERNAL MEDICINE

## 2023-02-23 PROCEDURE — 85027 COMPLETE CBC AUTOMATED: CPT | Performed by: INTERNAL MEDICINE

## 2023-02-23 PROCEDURE — 99214 OFFICE O/P EST MOD 30 MIN: CPT | Mod: S$PBB,,, | Performed by: INTERNAL MEDICINE

## 2023-02-23 PROCEDURE — 1160F RVW MEDS BY RX/DR IN RCRD: CPT | Mod: CPTII,,, | Performed by: INTERNAL MEDICINE

## 2023-02-23 PROCEDURE — 3078F PR MOST RECENT DIASTOLIC BLOOD PRESSURE < 80 MM HG: ICD-10-PCS | Mod: CPTII,,, | Performed by: INTERNAL MEDICINE

## 2023-02-23 PROCEDURE — 80053 COMPREHEN METABOLIC PANEL: CPT | Performed by: INTERNAL MEDICINE

## 2023-02-23 PROCEDURE — 99213 OFFICE O/P EST LOW 20 MIN: CPT | Mod: PBBFAC | Performed by: INTERNAL MEDICINE

## 2023-02-23 PROCEDURE — 3078F DIAST BP <80 MM HG: CPT | Mod: CPTII,,, | Performed by: INTERNAL MEDICINE

## 2023-02-23 PROCEDURE — 85007 BL SMEAR W/DIFF WBC COUNT: CPT | Performed by: INTERNAL MEDICINE

## 2023-02-23 RX ORDER — ALLOPURINOL 300 MG/1
300 TABLET ORAL 2 TIMES DAILY
Qty: 60 TABLET | Refills: 0 | Status: SHIPPED | OUTPATIENT
Start: 2023-02-23 | End: 2023-03-30 | Stop reason: SDUPTHER

## 2023-02-23 NOTE — PROGRESS NOTES
HEMATOLOGIC MALIGNANCIES CONSULT NOTE    IDENTIFYING STATEMENT   Linh Schmidt (Linh) is a 52 y.o. male with a  of 1970 from [facility],  for evaluation of leukocytosis.     Leukocytosis   2023: ED visit for left lateral abdominal pain. Abdominal Xray shows splenomegaly.   2023: Sent to the emergency department for abnormal labs. Left flank pain x 2 months.  K, Hgb 11.2 g/dL, Plt 143 K,  Cr 1.4, normal LFTs. Abdominal US with hepatosplenomegaly. Patient given hydrea and allopurinol, unable to transfer to [facility] due to diversion.   2023: Presents to [facility] ED. Improved WBC with hydrea. WBC 297K, Hgb 10.9 g/dL, 140K. Differential with basophilia, myelocytes and metamyelocytes.   2023: Bone marrow biopsy prelim is reported as chronic myeloid leukemia, in chronic phase. Cytogenetics and BCR/ABL pending.   2. HLD  3. Depression  4. Testosterone deficiency   5. Headaches     HISTORY OF PRESENT ILLNESS:    Patient presents with his wife and two children.     He reports that he feels well. Denies fevers, chills, sweats, n/v, LAD, change in bowel movements. Reports abdominal pain for about 2 months associated with fullness.     Past Medical History:   Diagnosis Date    Anxiety     GERD (gastroesophageal reflux disease)     Hypertension     Osteoarthritis        Family History   Problem Relation Age of Onset    Mental illness Mother     Diabetes Maternal Grandmother     Heart disease Maternal Grandfather     Hyperlipidemia Maternal Grandfather     Stroke Maternal Grandfather        Social History     Socioeconomic History    Marital status:    Tobacco Use    Smoking status: Former    Smokeless tobacco: Never   Substance and Sexual Activity    Alcohol use: No    Drug use: No    Sexual activity: Yes     Partners: Female   Other Topics Concern    Patient feels they ought to cut down on drinking/drug use No    Patient annoyed by others criticizing their drinking/drug use No    Patient has  "felt bad or guilty about drinking/drug use No    Patient has had a drink/used drugs as an eye opener in the AM No         MEDICATIONS:     Current Outpatient Medications on File Prior to Visit   Medication Sig Dispense Refill    atenoloL (TENORMIN) 50 MG tablet TAKE 1 TABLET BY MOUTH 2 TIMES DAILY. 60 tablet 1    atorvastatin (LIPITOR) 40 MG tablet Take 1 tablet (40 mg total) by mouth once daily. 90 tablet 3    butalbital-acetaminophen-caffeine -40 mg (FIORICET, ESGIC) -40 mg per tablet TAKE 1 TABLET BY MOUTH EVERY 6 HOURS AS NEEDED FOR PAIN OR FOR HEADACHE 120 tablet 0    citalopram (CELEXA) 40 MG tablet Take 40 mg by mouth once daily.      hydroxyurea (HYDREA) 500 mg Cap Take 3 capsules (1,500 mg total) by mouth 2 (two) times daily. 180 capsule 0    tamsulosin (FLOMAX) 0.4 mg Cap Take 1 capsule (0.4 mg total) by mouth once daily. 30 capsule 0    testosterone cypionate (DEPOTESTOTERONE CYPIONATE) 200 mg/mL injection INJECT 1 ML INTRAMUSCULARLY EVERY WEEK  1     No current facility-administered medications on file prior to visit.       ALLERGIES: Review of patient's allergies indicates:  No Known Allergies     ROS:     Constitutional: Negative for fatigue. Negative for activity change, appetite change, chills, fever and unexpected weight change.   HENT: Negative for nosebleeds and sore throat.    Respiratory: Negative for cough, chest tightness, shortness of breath and wheezing.    Cardiovascular: Negative for leg swelling. Negative for palpitations.   Gastrointestinal: Negative for constipation. Reports abdominal pain. Denies diarrhea, nausea and vomiting.   Musculoskeletal: Negative for arthralgias, joint swelling and myalgias.   Hematological: Negative for adenopathy.       PHYSICAL EXAM:  Vitals:    02/23/23 1457   BP: 113/69   Pulse: 65   Resp: 16   Temp: 98 °F (36.7 °C)   SpO2: 98%   Weight: 85.2 kg (187 lb 11.6 oz)   Height: 5' 9" (1.753 m)   PainSc: 0-No pain       Physical Exam  Constitutional:  "      Appearance: Normal appearance.   HENT:      Head: Normocephalic and atraumatic.      Nose: Nose normal.   Eyes:      Extraocular Movements: Extraocular movements intact.      Pupils: Pupils are equal, round, and reactive to light.   Cardiovascular:      Rate and Rhythm: Normal rate and regular rhythm.   Pulmonary:      Effort: Pulmonary effort is normal.      Breath sounds: Normal breath sounds.   Abdominal:      Comments: +LUQ fullness, + splenomegaly below costal margin    Skin:     General: Skin is warm and dry.   Neurological:      General: No focal deficit present.      Mental Status: He is alert and oriented to person, place, and time.   Psychiatric:         Mood and Affect: Mood normal.         Behavior: Behavior normal.       LAB:   Results for orders placed or performed in visit on 02/16/23   Heme Disorders DNA/RNA Hold, Bone Marrow   Result Value Ref Range    EXHR Specimen Type BM     EXHR Extract and Hold Result see method     EXHR Extraction Performed    Leukemia/Lymphoma Screen - Bone Marrow Left Posterior Iliac Crest   Result Value Ref Range    Clinical Diagnosis - Bone Marrow LC     Body Site - Bone Marrow LPI     Bone Marrow Interpretation       No abnormal hematopoietic population is detected in this sample.    Bone Marrow Antibodies Analyzed       All analyzed: CD2, CD3, CD4, CD5, CD7, CD8, CD10, CD13, CD19, CD20, CD34,  , KAPPA, LAMBDA,CD45 and 7AAD.      Bone Marrow Comment       Flow cytometric analysis of  bone marrow shows populations of polyclonal B  lymphocytes and T lymphocytes that are immunophenotypically unremarkable.  The blast gate is not increased.  Flow differential:  Lymphocytes 0.6%, Monocytes 0.3%, Granulocytes  87.0%,  Blast  1.0%, Debris/nRBC 0.6%,  Viability 99.9%.     OncoHeme (NGS) Hematologic Neoplasms, BM Diagnosis or Indication for test: Leukocytosis   Result Value Ref Range    NGS Specimen Type Bone Marrow    Chromosome Analysis, Bone Marrow Left Posterior Iliac  Crest   Result Value Ref Range    Reason for Referral Leukocytosis        PROBLEMS ASSESSED THIS VISIT:    1. Chronic myeloid leukemia        IMPRESSION:    1. Chronic myeloid leukemia    PLAN:       Patient presents with a new diagnosis, presumed chronic myeloid leukemia in chronic phase per preliminary bone marrow biopsy . Discussed this is a hematological malignancy that can have a favorable prognosis with use of tyrosine kinase inhibitors when molecular disease control is achieved.     Pending final path to start TKI. Will plan to start second generation TKI, dasatinib, due to improved molecular response rates.     TLS  Allopurinol 300mg BID    Route Chart for Scheduling    BMT Chart Routing  Urgent    Follow up with physician 4 weeks.   Follow up with [facility]    Provider visit type    Infusion scheduling note    Injection scheduling note    Labs CBC, CMP, uric acid and phosphorus   Lab interval:  weekly for two weeks at [facility]   Imaging    Pharmacy appointment    Other referrals             Naty Underwood MD  Hematology/Oncology Fellow PGY V  [facility]

## 2023-02-24 LAB
CHROM BANDING METHOD: NORMAL
CHROMOSOME ANALYSIS BM ADDITIONAL INFORMATION: NORMAL
CHROMOSOME ANALYSIS BM RELEASED BY: NORMAL
CHROMOSOME ANALYSIS BM RESULT SUMMARY: NORMAL
CLINICAL CYTOGENETICIST REVIEW: NORMAL
KARYOTYP MAR: NORMAL
REASON FOR REFERRAL (NARRATIVE): NORMAL
REF LAB TEST METHOD: NORMAL
SPECIMEN SOURCE: NORMAL
SPECIMEN: NORMAL

## 2023-03-01 LAB
DIAGNOSTIC BCR/ABL 1 RESULT: NORMAL
NARRATIVE DIAGNOSTIC REPORT-IMP: NORMAL
SPECIMEN TYPE, BCR/ABL: NORMAL

## 2023-03-02 DIAGNOSIS — C92.10 CHRONIC MYELOID LEUKEMIA: Primary | ICD-10-CM

## 2023-03-02 LAB
ANNOTATION COMMENT IMP: NORMAL
COMMENT: NORMAL
FINAL PATHOLOGIC DIAGNOSIS: NORMAL
GROSS: NORMAL
Lab: NORMAL
MICROSCOPIC EXAM: NORMAL
NGS CLINCIAL TRIALS: NORMAL
NGS INDICATION OF TEST: NORMAL
NGS INTERPRETATION: NORMAL
NGS ONCOHEME PANEL GENE LIST: NORMAL
NGS PATHOGENIC MUTATIONS DETECTED: NORMAL
NGS REVIEWED BY:: NORMAL
NGS VARIANTS OF UNKNOWN SIGNIFICANCE: NORMAL
NGSHM RESULT, BONE MARROW: NORMAL
REF LAB TEST METHOD: NORMAL
SPECIMEN SOURCE: NORMAL
SUPPLEMENTAL DIAGNOSIS: NORMAL
TEST PERFORMANCE INFO SPEC: NORMAL

## 2023-03-06 ENCOUNTER — SPECIALTY PHARMACY (OUTPATIENT)
Dept: PHARMACY | Facility: CLINIC | Age: 53
End: 2023-03-06
Payer: MEDICAID

## 2023-03-06 NOTE — TELEPHONE ENCOUNTER
Melina, this is Killian Evans with [facility].  We are working on your prescription that your doctor has sent us. We will be working with your insurance to get this approved for you. We will be calling you along the way with updates on your medication.  If you have any questions, you can reach us at [phone].    Welcome call outcome: Patient/caregiver reached    No PA required. Test claim: $0 copay. Releasing script to [facility] for initial consult.

## 2023-03-08 ENCOUNTER — SPECIALTY PHARMACY (OUTPATIENT)
Dept: PHARMACY | Facility: CLINIC | Age: 53
End: 2023-03-08
Payer: MEDICAID

## 2023-03-08 DIAGNOSIS — C92.10 CML (CHRONIC MYELOCYTIC LEUKEMIA): Primary | ICD-10-CM

## 2023-03-08 RX ORDER — FLUOXETINE HYDROCHLORIDE 20 MG/1
60 CAPSULE ORAL DAILY
COMMUNITY
End: 2023-03-30

## 2023-03-08 NOTE — TELEPHONE ENCOUNTER
Specialty Pharmacy - Initial Clinical Assessment    Specialty Medication Orders Linked to Encounter      Flowsheet Row Most Recent Value   Medication #1 dasatinib (SPRYCEL) 100 mg (Order#[id number], Rx#[id number])          Patient Diagnosis   C92.10 - CML (chronic myelocytic leukemia)    Subjective    Linh Schmidt is a 52 y.o. male, who is followed by the specialty pharmacy service for management and education.    Recent Encounters       Date Type Provider Description    03/08/2023 Specialty Pharmacy Nilo Cope, Rosanna Initial Clinical Assessment    03/06/2023 Specialty Pharmacy Killian Evans PharmD Referral Authorization          Clinical call attempts since last clinical assessment   No call attempts found.     Current Outpatient Medications   Medication Sig    FLUoxetine 20 MG capsule Take 60 mg by mouth once daily.    multivitamin capsule Take 1 capsule by mouth once daily.    allopurinoL (ZYLOPRIM) 300 MG tablet Take 1 tablet (300 mg total) by mouth 2 (two) times daily.    atenoloL (TENORMIN) 50 MG tablet TAKE 1 TABLET BY MOUTH 2 TIMES DAILY.    atorvastatin (LIPITOR) 40 MG tablet Take 1 tablet (40 mg total) by mouth once daily.    butalbital-acetaminophen-caffeine -40 mg (FIORICET, ESGIC) -40 mg per tablet TAKE 1 TABLET BY MOUTH EVERY 6 HOURS AS NEEDED FOR PAIN OR FOR HEADACHE    dasatinib (SPRYCEL) 100 mg Take 1 tablet (100 mg total) by mouth once daily.    hydroxyurea (HYDREA) 500 mg Cap Take 3 capsules (1,500 mg total) by mouth 2 (two) times daily.    tamsulosin (FLOMAX) 0.4 mg Cap Take 1 capsule (0.4 mg total) by mouth once daily.    testosterone cypionate (DEPOTESTOTERONE CYPIONATE) 200 mg/mL injection INJECT 1 ML INTRAMUSCULARLY EVERY WEEK   Last reviewed on 2/27/2023 11:33 AM by José Carrion MD    Review of patient's allergies indicates:  No Known AllergiesLast reviewed on  3/8/2023 9:46 AM by Nilo Cope    Drug Interactions    Drug interactions evaluated:  yes  Clinically relevant drug interactions identified: yes   Interactions list: Acetaminophen/Sprycel (Class D) - Acetaminophen may enhance the hepatotoxic effect of Dasatinib. Dasatinib may increase the serum concentration of Acetaminophen.  Ibuprofen/Sprycel (Class C) - Dasatinib may enhance the anticoagulant effect of Agents with Antiplatelet Properties     Drug management plan: Acetaminophen/Sprycel - patient advised to minimize use, stay under 2g/day; patient will plan to use ibuprofen.  Ibuprofen/Sprycel - monitor for s/sx of bleeding/bruising   Provided the patient with educational material regarding drug interactions: not applicable           Assessment Questions - Documented Responses      Flowsheet Row Most Recent Value   Assessment    Medication Reconciliation completed for patient Yes   During the past 4 weeks, has patient missed any activities due to condition or medication? No   During the past 4 weeks, did patient have any of the following urgent care visits? None   Goals of Therapy Status Discussed (new start)   Status of the patients ability to self-administer: Is Able   All education points have been covered with patient? Yes, supplemental printed education provided   Welcome packet contents reviewed and discussed with patient? Yes   Assesment completed? Yes   Plan Therapy being initiated   Do you need to open a clinical intervention (i-vent)? No   Do you want to schedule first shipment? Yes   Medication #1 Assessment Info    Patient status New medication, New to [facility]   Is this medication appropriate for the patient? Yes   Is this medication effective? Not yet started          Refill Questions - Documented Responses      Flowsheet Row Most Recent Value   Patient Availability and HIPAA Verification    Does patient want to proceed with activity? Yes   HIPAA/medical authority confirmed? Yes   Relationship to patient of person spoken to? Self   Refill Screening Questions    When does the patient need  "to receive the medication? 03/09/23   Refill Delivery Questions    How will the patient receive the medication? Pickup   When does the patient need to receive the medication? 03/09/23   Expected Copay ($) 0   Is the patient able to afford the medication copay? Yes   Payment Method zero copay   Days supply of Refill 30   Supplies needed? No supplies needed   Refill activity completed? Yes   Refill activity plan Refill scheduled   Shipment/Pickup Date: 03/09/23            Objective    He has a past medical history of Anxiety, GERD (gastroesophageal reflux disease), Hypertension, and Osteoarthritis.    Tried/failed medications: none    BP Readings from Last 4 Encounters:   02/23/23 113/69   02/16/23 122/63   02/16/23 (!) 113/59   02/14/23 125/79     Ht Readings from Last 4 Encounters:   02/23/23 5' 9" (1.753 m)   02/14/23 5' 9" (1.753 m)   01/20/23 5' 9" (1.753 m)   06/17/21 5' 9" (1.753 m)     Wt Readings from Last 4 Encounters:   02/23/23 85.2 kg (187 lb 11.6 oz)   02/14/23 85.2 kg (187 lb 13.3 oz)   01/20/23 88.5 kg (195 lb)   06/17/21 95.7 kg (210 lb 15.7 oz)     Recent Labs   Lab Result Units 03/02/23  1540 02/23/23  1604 02/16/23  0543 02/14/23  1753   RBC M/uL 3.81 L 3.65 L 3.68 L 3.67 L   Hemoglobin g/dL 11.3 L 10.6 L 10.9 L 11.2 L   Hematocrit % 36.6 L 34.5 L 34.6 L 34.3 L   WBC K/uL 33.25 H 151.40 .10 .60 HH   Gran % % 70.0 48.0 64.0 20.0 L   Platelets K/uL 207 136 L 140 L 143 L   Sodium mmol/L 140 138 137 141   Potassium mmol/L 4.3 4.3 3.8 4.2   Chloride mmol/L 106 105 104 106   Glucose mg/dL 109 89 112 H 87   BUN mg/dL 21 H 18 18 19   Creatinine mg/dL 1.3 1.1 1.3 1.4   Calcium mg/dL 9.3 8.9 9.5 9.6   Total Protein g/dL 6.9 6.7 6.9 7.6   Albumin g/dL 3.7 3.5 3.6 4.0   Total Bilirubin mg/dL 0.6 0.6 0.6 0.6   Alkaline Phosphatase U/L 75 77 97 102   AST U/L 23 28 34 34   ALT U/L 30 33 32 31     The goals of cancer treatment include:  Achieving remission of cancer, if possible  Reducing tumor size " and spread of cancer, if remission is not possible  Minimizing pain and symptoms of the cancer  Preventing infection and other complications of treatment  Promoting adequate nutrition  Encouraging proper hydration  Improving or maintaining quality of life  Maintaining optimal therapy adherence  Minimizing and managing side effects    Goals of Therapy Status: Discussed (new start)    Assessment/Plan  Patient plans to start therapy on 03/09/23      Indication, dosage, appropriateness, effectiveness, safety and convenience of his specialty medication(s) were reviewed today.     Patient Education   Patient received education on the following:   Expectations and possible outcomes of therapy  Proper use, timely administration, and missed dose management  Duration of therapy  Side effects, including prevention, minimization, and management  Contraindications and safety precautions  New or changed medications, including prescribe and over the counter medications and supplements  Reviews recommended vaccinations, as appropriate  Storage, safe handling, and disposal        Tasks added this encounter   4/1/2023 - Refill Call (Auto Added)  3/10/2023 - Pickup Reminder  8/28/2023 - Clinical - Follow Up Assesement (180 day)   Tasks due within next 3 months   No tasks due.     Nilo Cope, PharmD  [facility]  [address]  [facility] [address]  Phone: [phone]  Fax: [phone]

## 2023-03-11 ENCOUNTER — PATIENT MESSAGE (OUTPATIENT)
Dept: HEMATOLOGY/ONCOLOGY | Facility: CLINIC | Age: 53
End: 2023-03-11
Payer: MEDICAID

## 2023-03-11 ENCOUNTER — NURSE TRIAGE (OUTPATIENT)
Dept: ADMINISTRATIVE | Facility: CLINIC | Age: 53
End: 2023-03-11
Payer: MEDICAID

## 2023-03-12 NOTE — TELEPHONE ENCOUNTER
Caller has questions about taking newly prescribed Sprycel and Hydrea which was prescribed x 1 month ago together. On call provider contacted, Dr. Wade, stated that pt should continue sprycel and hold hydrea until Monday when Dr. Underwood office.   Reason for Disposition   [1] Caller has URGENT medicine question about med that PCP or specialist prescribed AND [2] triager unable to answer question    Additional Information   Negative: [1] Intentional drug overdose AND [2] suicidal thoughts or ideas   Negative: MORE THAN A DOUBLE DOSE of a prescription or over-the-counter (OTC) drug   Negative: [1] DOUBLE DOSE (an extra dose or lesser amount) of prescription drug AND [2] any symptoms (e.g., dizziness, nausea, pain, sleepiness)   Negative: [1] DOUBLE DOSE (an extra dose or lesser amount) of over-the-counter (OTC) drug AND [2] any symptoms (e.g., dizziness, nausea, pain, sleepiness)   Negative: Took another person's prescription drug   Negative: [1] DOUBLE DOSE (an extra dose or lesser amount) of prescription drug AND [2] NO symptoms (Exception: a double dose of antibiotics)   Negative: Diabetes drug error or overdose (e.g., took wrong type of insulin or took extra dose)   Negative: [1] Prescription not at pharmacy AND [2] was prescribed by PCP recently (Exception: triager has access to EMR and prescription is recorded there. Go to Home Care and confirm for pharmacy.)   Negative: [1] Pharmacy calling with prescription question AND [2] triager unable to answer question    Protocols used: Medication Question Call-A-

## 2023-03-30 ENCOUNTER — OFFICE VISIT (OUTPATIENT)
Dept: HEMATOLOGY/ONCOLOGY | Facility: CLINIC | Age: 53
End: 2023-03-30
Payer: MEDICAID

## 2023-03-30 VITALS
HEART RATE: 63 BPM | BODY MASS INDEX: 29.33 KG/M2 | DIASTOLIC BLOOD PRESSURE: 58 MMHG | OXYGEN SATURATION: 97 % | TEMPERATURE: 99 F | SYSTOLIC BLOOD PRESSURE: 121 MMHG | RESPIRATION RATE: 16 BRPM | HEIGHT: 69 IN | WEIGHT: 198 LBS

## 2023-03-30 DIAGNOSIS — C92.10 CHRONIC MYELOID LEUKEMIA: Primary | ICD-10-CM

## 2023-03-30 DIAGNOSIS — R33.9 URINARY RETENTION: ICD-10-CM

## 2023-03-30 PROCEDURE — 3074F PR MOST RECENT SYSTOLIC BLOOD PRESSURE < 130 MM HG: ICD-10-PCS | Mod: CPTII,,, | Performed by: INTERNAL MEDICINE

## 2023-03-30 PROCEDURE — 1159F MED LIST DOCD IN RCRD: CPT | Mod: CPTII,,, | Performed by: INTERNAL MEDICINE

## 2023-03-30 PROCEDURE — 3008F PR BODY MASS INDEX (BMI) DOCUMENTED: ICD-10-PCS | Mod: CPTII,,, | Performed by: INTERNAL MEDICINE

## 2023-03-30 PROCEDURE — 3074F SYST BP LT 130 MM HG: CPT | Mod: CPTII,,, | Performed by: INTERNAL MEDICINE

## 2023-03-30 PROCEDURE — 99999 PR PBB SHADOW E&M-EST. PATIENT-LVL III: ICD-10-PCS | Mod: PBBFAC,,, | Performed by: INTERNAL MEDICINE

## 2023-03-30 PROCEDURE — 3008F BODY MASS INDEX DOCD: CPT | Mod: CPTII,,, | Performed by: INTERNAL MEDICINE

## 2023-03-30 PROCEDURE — 3078F DIAST BP <80 MM HG: CPT | Mod: CPTII,,, | Performed by: INTERNAL MEDICINE

## 2023-03-30 PROCEDURE — 99213 OFFICE O/P EST LOW 20 MIN: CPT | Mod: PBBFAC | Performed by: INTERNAL MEDICINE

## 2023-03-30 PROCEDURE — 99215 OFFICE O/P EST HI 40 MIN: CPT | Mod: S$PBB,,, | Performed by: INTERNAL MEDICINE

## 2023-03-30 PROCEDURE — 99999 PR PBB SHADOW E&M-EST. PATIENT-LVL III: CPT | Mod: PBBFAC,,, | Performed by: INTERNAL MEDICINE

## 2023-03-30 PROCEDURE — 99215 PR OFFICE/OUTPT VISIT, EST, LEVL V, 40-54 MIN: ICD-10-PCS | Mod: S$PBB,,, | Performed by: INTERNAL MEDICINE

## 2023-03-30 PROCEDURE — 3078F PR MOST RECENT DIASTOLIC BLOOD PRESSURE < 80 MM HG: ICD-10-PCS | Mod: CPTII,,, | Performed by: INTERNAL MEDICINE

## 2023-03-30 PROCEDURE — 1159F PR MEDICATION LIST DOCUMENTED IN MEDICAL RECORD: ICD-10-PCS | Mod: CPTII,,, | Performed by: INTERNAL MEDICINE

## 2023-03-30 PROCEDURE — 1160F RVW MEDS BY RX/DR IN RCRD: CPT | Mod: CPTII,,, | Performed by: INTERNAL MEDICINE

## 2023-03-30 PROCEDURE — 1160F PR REVIEW ALL MEDS BY PRESCRIBER/CLIN PHARMACIST DOCUMENTED: ICD-10-PCS | Mod: CPTII,,, | Performed by: INTERNAL MEDICINE

## 2023-03-30 RX ORDER — IBUPROFEN 800 MG/1
800 TABLET ORAL EVERY 8 HOURS PRN
COMMUNITY
Start: 2022-10-24

## 2023-03-30 RX ORDER — ALLOPURINOL 300 MG/1
300 TABLET ORAL DAILY
Qty: 30 TABLET | Refills: 0 | Status: SHIPPED | OUTPATIENT
Start: 2023-03-30 | End: 2023-04-29

## 2023-03-30 RX ORDER — DEXTROAMPHETAMINE SACCHARATE, AMPHETAMINE ASPARTATE, DEXTROAMPHETAMINE SULFATE AND AMPHETAMINE SULFATE 5; 5; 5; 5 MG/1; MG/1; MG/1; MG/1
1 TABLET ORAL 3 TIMES DAILY
COMMUNITY
Start: 2023-03-14

## 2023-03-30 RX ORDER — TAMSULOSIN HYDROCHLORIDE 0.4 MG/1
0.4 CAPSULE ORAL DAILY
Qty: 30 CAPSULE | Refills: 0 | Status: SHIPPED | OUTPATIENT
Start: 2023-03-30 | End: 2023-05-12 | Stop reason: SDUPTHER

## 2023-03-30 RX ORDER — FLUOXETINE HYDROCHLORIDE 40 MG/1
40 CAPSULE ORAL
COMMUNITY
Start: 2022-11-19

## 2023-03-30 NOTE — PROGRESS NOTES
HEMATOLOGIC MALIGNANCIES CONSULT NOTE    IDENTIFYING STATEMENT   Linh Schmidt (Linh) is a 52 y.o. male with a  of 1970 from [facility],  for evaluation of leukocytosis.     Leukocytosis   2023: ED visit for left lateral abdominal pain. Abdominal Xray shows splenomegaly.   2023: Sent to the emergency department for abnormal labs. Left flank pain x 2 months.  K, Hgb 11.2 g/dL, Plt 143 K,  Cr 1.4, normal LFTs. Abdominal US with hepatosplenomegaly. Patient given hydrea and allopurinol, unable to transfer to [facility] due to diversion.   2023: Presents to [facility] ED. Improved WBC with hydrea. WBC 297K, Hgb 10.9 g/dL, 140K. Differential with basophilia, myelocytes and metamyelocytes.   2023: Bone marrow biopsy prelim is reported as chronic myeloid leukemia, in chronic phase. BCR/ABL p210 transcript > 55%.   3/8/2023: Started dasatinib   2. HLD  3. Depression  4. Testosterone deficiency   5. Headaches     HISTORY OF PRESENT ILLNESS:    Patient presents with his wife.     Presents for 3 week toxicity check after starting dasatinib.     He reports that he feels well. Denies fevers, chills, sweats, n/v, LAD, change in bowel movements. Reports abdominal pain improved.     Past Medical History:   Diagnosis Date    Anxiety     GERD (gastroesophageal reflux disease)     Hypertension     Osteoarthritis        Family History   Problem Relation Age of Onset    Mental illness Mother     Diabetes Maternal Grandmother     Heart disease Maternal Grandfather     Hyperlipidemia Maternal Grandfather     Stroke Maternal Grandfather        Social History     Socioeconomic History    Marital status:    Tobacco Use    Smoking status: Former    Smokeless tobacco: Never   Substance and Sexual Activity    Alcohol use: No    Drug use: No    Sexual activity: Yes     Partners: Female   Other Topics Concern    Patient feels they ought to cut down on drinking/drug use No    Patient annoyed by others criticizing  their drinking/drug use No    Patient has felt bad or guilty about drinking/drug use No    Patient has had a drink/used drugs as an eye opener in the AM No         MEDICATIONS:     Current Outpatient Medications on File Prior to Visit   Medication Sig Dispense Refill    atenoloL (TENORMIN) 50 MG tablet TAKE 1 TABLET BY MOUTH 2 TIMES DAILY. 60 tablet 1    atorvastatin (LIPITOR) 40 MG tablet Take 1 tablet (40 mg total) by mouth once daily. 90 tablet 3    butalbital-acetaminophen-caffeine -40 mg (FIORICET, ESGIC) -40 mg per tablet TAKE 1 TABLET BY MOUTH EVERY 6 HOURS AS NEEDED FOR PAIN OR FOR HEADACHE 120 tablet 0    dasatinib (SPRYCEL) 100 mg Take 1 tablet (100 mg total) by mouth once daily. 30 tablet 0    FLUoxetine 20 MG capsule Take 60 mg by mouth once daily.      multivitamin capsule Take 1 capsule by mouth once daily.      tamsulosin (FLOMAX) 0.4 mg Cap Take 1 capsule (0.4 mg total) by mouth once daily. 30 capsule 0    testosterone cypionate (DEPOTESTOTERONE CYPIONATE) 200 mg/mL injection INJECT 1 ML INTRAMUSCULARLY EVERY WEEK  1     No current facility-administered medications on file prior to visit.       ALLERGIES: Review of patient's allergies indicates:  No Known Allergies     ROS:     Constitutional: Negative for fatigue. Negative for activity change, appetite change, chills, fever and unexpected weight change.   HENT: Negative for nosebleeds and sore throat.    Respiratory: Negative for cough, chest tightness, shortness of breath and wheezing.    Cardiovascular: Negative for leg swelling. Negative for palpitations.   Gastrointestinal: Negative for constipation. Improved abdominal pain. Denies diarrhea, nausea and vomiting.   Musculoskeletal: Negative for arthralgias, joint swelling and myalgias.   Hematological: Negative for adenopathy.       PHYSICAL EXAM:  Vitals:    03/30/23 1501   BP: (!) 121/58   Pulse: 63   Resp: 16   Temp: 98.8 °F (37.1 °C)   SpO2: 97%   Weight: 89.8 kg (197 lb 15.6 oz)  "  Height: 5' 9" (1.753 m)   PainSc: 0-No pain       Physical Exam  Constitutional:       Appearance: Normal appearance.   HENT:      Head: Normocephalic and atraumatic.      Nose: Nose normal.   Eyes:      Extraocular Movements: Extraocular movements intact.      Pupils: Pupils are equal, round, and reactive to light.   Cardiovascular:      Rate and Rhythm: Normal rate and regular rhythm.   Pulmonary:      Effort: Pulmonary effort is normal.      Breath sounds: Normal breath sounds.   Abdominal:      Comments: Abdominal distention improved. Splenomegaly improved.    Skin:     General: Skin is warm and dry.   Neurological:      General: No focal deficit present.      Mental Status: He is alert and oriented to person, place, and time.   Psychiatric:         Mood and Affect: Mood normal.         Behavior: Behavior normal.     LAB:   Results for orders placed or performed in visit on 03/29/23   CBC Auto Differential   Result Value Ref Range    WBC 5.64 3.90 - 12.70 K/uL    RBC 4.17 (L) 4.60 - 6.20 M/uL    Hemoglobin 13.6 (L) 14.0 - 18.0 g/dL    Hematocrit 43.2 40.0 - 54.0 %     (H) 82 - 98 fL    MCH 32.6 (H) 27.0 - 31.0 pg    MCHC 31.5 (L) 32.0 - 36.0 g/dL    RDW 25.6 (H) 11.5 - 14.5 %    Platelets 319 150 - 450 K/uL    MPV 10.6 9.2 - 12.9 fL    Immature Granulocytes 0.5 0.0 - 0.5 %    Gran # (ANC) 2.6 1.8 - 7.7 K/uL    Immature Grans (Abs) 0.03 0.00 - 0.04 K/uL    Lymph # 2.2 1.0 - 4.8 K/uL    Mono # 0.6 0.3 - 1.0 K/uL    Eos # 0.2 0.0 - 0.5 K/uL    Baso # 0.01 0.00 - 0.20 K/uL    nRBC 0 0 /100 WBC    Gran % 45.8 38.0 - 73.0 %    Lymph % 39.7 18.0 - 48.0 %    Mono % 10.1 4.0 - 15.0 %    Eosinophil % 3.7 0.0 - 8.0 %    Basophil % 0.2 0.0 - 1.9 %    Differential Method Automated    Comprehensive Metabolic Panel   Result Value Ref Range    Sodium 140 136 - 145 mmol/L    Potassium 4.3 3.5 - 5.1 mmol/L    Chloride 109 95 - 110 mmol/L    CO2 22 (L) 23 - 29 mmol/L    Glucose 93 70 - 110 mg/dL    BUN 16 6 - 20 mg/dL    " Creatinine 1.3 0.5 - 1.4 mg/dL    Calcium 8.8 8.7 - 10.5 mg/dL    Total Protein 6.9 6.0 - 8.4 g/dL    Albumin 4.0 3.5 - 5.2 g/dL    Total Bilirubin 0.3 0.1 - 1.0 mg/dL    Alkaline Phosphatase 82 55 - 135 U/L    AST 36 10 - 40 U/L    ALT 31 10 - 44 U/L    Anion Gap 9 8 - 16 mmol/L    eGFR >60.0 >60 mL/min/1.73 m^2   Uric Acid   Result Value Ref Range    Uric Acid 4.0 3.4 - 7.0 mg/dL   Phosphorus   Result Value Ref Range    Phosphorus 2.3 (L) 2.7 - 4.5 mg/dL       PROBLEMS ASSESSED THIS VISIT:    1. Chronic myeloid leukemia    2. Urinary retention      IMPRESSION:    1. Chronic myeloid leukemia    PLAN:       Patient presents with a new diagnosis of chronic myeloid leukemia in chronic phase.  Discussed this is a hematological malignancy that can have a favorable prognosis with use of tyrosine kinase inhibitors when molecular disease control is achieved.     Tolerating dasatinib well. No toxicities thus far. In hematology recovery. BCR/ABL in 2 months.   Allopurinol 300mg daily.     Route Chart for Scheduling    BMT Chart Routing  Urgent    Follow up with physician 2 months.   Follow up with [facility]    Provider visit type    Infusion scheduling note    Injection scheduling note    Labs CBC, CMP and BCR/ABL   Scheduling:  Preferred lab:  Lab interval:  in 2 months prior to md visit.   Imaging    Pharmacy appointment    Other referrals            Naty Underwood MD  Hematology/Oncology Fellow PGY V  [facility]

## 2023-04-03 ENCOUNTER — PATIENT MESSAGE (OUTPATIENT)
Dept: PHARMACY | Facility: CLINIC | Age: 53
End: 2023-04-03
Payer: MEDICAID

## 2023-04-03 DIAGNOSIS — C92.10 CHRONIC MYELOID LEUKEMIA: ICD-10-CM

## 2023-04-06 ENCOUNTER — SPECIALTY PHARMACY (OUTPATIENT)
Dept: PHARMACY | Facility: CLINIC | Age: 53
End: 2023-04-06
Payer: MEDICAID

## 2023-04-17 ENCOUNTER — PATIENT MESSAGE (OUTPATIENT)
Dept: HEMATOLOGY/ONCOLOGY | Facility: CLINIC | Age: 53
End: 2023-04-17
Payer: MEDICAID

## 2023-05-03 DIAGNOSIS — C92.10 CHRONIC MYELOID LEUKEMIA: ICD-10-CM

## 2023-05-04 ENCOUNTER — PATIENT MESSAGE (OUTPATIENT)
Dept: PHARMACY | Facility: CLINIC | Age: 53
End: 2023-05-04
Payer: MEDICAID

## 2023-05-08 ENCOUNTER — SPECIALTY PHARMACY (OUTPATIENT)
Dept: PHARMACY | Facility: CLINIC | Age: 53
End: 2023-05-08
Payer: MEDICAID

## 2023-05-08 NOTE — TELEPHONE ENCOUNTER
Specialty Pharmacy - Refill Coordination    Specialty Medication Orders Linked to Encounter      Flowsheet Row Most Recent Value   Medication #1 dasatinib (SPRYCEL) 100 mg (Order#[id number], Rx#[id number])            Refill Questions - Documented Responses      Flowsheet Row Most Recent Value   Patient Availability and HIPAA Verification    Does patient want to proceed with activity? Yes   HIPAA/medical authority confirmed? Yes   Relationship to patient of person spoken to? Self   Refill Screening Questions    Changes to allergies? No   Changes to medications? No   New conditions since last clinic visit? No   Unplanned office visit, urgent care, ED, or hospital admission in the last 4 weeks? No   How does patient/caregiver feel medication is working? Good   Financial problems or insurance changes? No   How many doses of your specialty medications were missed in the last 4 weeks? 1   Why were doses missed? Simply forgot   Would patient like to speak to a pharmacist? No   When does the patient need to receive the medication? 05/15/23   Refill Delivery Questions    How will the patient receive the medication? MEDRx   When does the patient need to receive the medication? 05/15/23   Address in [facility] confirmed and updated if neccessary? Yes   Expected Copay ($) 0   Is the patient able to afford the medication copay? Yes   Payment Method zero copay   Days supply of Refill 30   Supplies needed? No supplies needed   Refill activity completed? Yes   Refill activity plan Refill scheduled   Shipment/Pickup Date: 05/10/23            Current Outpatient Medications   Medication Sig    atenoloL (TENORMIN) 50 MG tablet TAKE 1 TABLET BY MOUTH 2 TIMES DAILY.    atorvastatin (LIPITOR) 40 MG tablet Take 1 tablet (40 mg total) by mouth once daily.    butalbital-acetaminophen-caffeine -40 mg (FIORICET, ESGIC) -40 mg per tablet TAKE 1 TABLET BY MOUTH EVERY 6 HOURS AS NEEDED FOR PAIN OR FOR HEADACHE    dasatinib  (SPRYCEL) 100 mg Take 1 tablet (100 mg total) by mouth once daily.    dextroamphetamine-amphetamine (ADDERALL) 20 mg tablet Take 1 tablet by mouth 3 (three) times daily.    FLUoxetine 40 MG capsule Take 40 mg by mouth.    ibuprofen (ADVIL,MOTRIN) 800 MG tablet Take 800 mg by mouth every 8 (eight) hours as needed.    multivitamin capsule Take 1 capsule by mouth once daily.    tamsulosin (FLOMAX) 0.4 mg Cap Take 1 capsule (0.4 mg total) by mouth once daily.    testosterone cypionate (DEPOTESTOTERONE CYPIONATE) 200 mg/mL injection INJECT 1 ML INTRAMUSCULARLY EVERY WEEK   Last reviewed on 4/4/2023  9:11 AM by José Carrion MD    Review of patient's allergies indicates:  No Known Allergies Last reviewed on  4/4/2023 9:11 AM by José Carrion      Tasks added this encounter   No tasks added.   Tasks due within next 3 months   5/8/2023 - Refill Coordination Outreach (1 time occurrence)     [facility]  [address] [place] [id number]  Phone: [phone]  Fax: [phone]

## 2023-05-12 DIAGNOSIS — R33.9 URINARY RETENTION: ICD-10-CM

## 2023-05-12 RX ORDER — TAMSULOSIN HYDROCHLORIDE 0.4 MG/1
0.4 CAPSULE ORAL DAILY
Qty: 30 CAPSULE | Refills: 0 | Status: SHIPPED | OUTPATIENT
Start: 2023-05-12 | End: 2023-06-18 | Stop reason: SDUPTHER

## 2023-06-01 ENCOUNTER — OFFICE VISIT (OUTPATIENT)
Dept: HEMATOLOGY/ONCOLOGY | Facility: CLINIC | Age: 53
End: 2023-06-01
Payer: MEDICAID

## 2023-06-01 ENCOUNTER — TELEPHONE (OUTPATIENT)
Dept: HEMATOLOGY/ONCOLOGY | Facility: CLINIC | Age: 53
End: 2023-06-01
Payer: MEDICAID

## 2023-06-01 ENCOUNTER — LAB VISIT (OUTPATIENT)
Dept: LAB | Facility: HOSPITAL | Age: 53
End: 2023-06-01
Payer: MEDICAID

## 2023-06-01 VITALS
HEIGHT: 69 IN | RESPIRATION RATE: 18 BRPM | BODY MASS INDEX: 27.74 KG/M2 | OXYGEN SATURATION: 96 % | HEART RATE: 68 BPM | TEMPERATURE: 99 F | WEIGHT: 187.25 LBS | DIASTOLIC BLOOD PRESSURE: 60 MMHG | SYSTOLIC BLOOD PRESSURE: 117 MMHG

## 2023-06-01 DIAGNOSIS — C92.10 CHRONIC MYELOID LEUKEMIA: ICD-10-CM

## 2023-06-01 DIAGNOSIS — C92.10 CHRONIC MYELOID LEUKEMIA: Primary | ICD-10-CM

## 2023-06-01 LAB
ALBUMIN SERPL BCP-MCNC: 3.9 G/DL (ref 3.5–5.2)
ALP SERPL-CCNC: 84 U/L (ref 55–135)
ALT SERPL W/O P-5'-P-CCNC: 50 U/L (ref 10–44)
ANION GAP SERPL CALC-SCNC: 11 MMOL/L (ref 8–16)
AST SERPL-CCNC: 33 U/L (ref 10–40)
BASOPHILS # BLD AUTO: 0.03 K/UL (ref 0–0.2)
BASOPHILS NFR BLD: 0.3 % (ref 0–1.9)
BILIRUB SERPL-MCNC: 0.6 MG/DL (ref 0.1–1)
BUN SERPL-MCNC: 12 MG/DL (ref 6–20)
CALCIUM SERPL-MCNC: 9.2 MG/DL (ref 8.7–10.5)
CHLORIDE SERPL-SCNC: 105 MMOL/L (ref 95–110)
CO2 SERPL-SCNC: 26 MMOL/L (ref 23–29)
CREAT SERPL-MCNC: 1.4 MG/DL (ref 0.5–1.4)
DIFFERENTIAL METHOD: ABNORMAL
EOSINOPHIL # BLD AUTO: 0.3 K/UL (ref 0–0.5)
EOSINOPHIL NFR BLD: 3.1 % (ref 0–8)
ERYTHROCYTE [DISTWIDTH] IN BLOOD BY AUTOMATED COUNT: 16.6 % (ref 11.5–14.5)
EST. GFR  (NO RACE VARIABLE): >60 ML/MIN/1.73 M^2
GLUCOSE SERPL-MCNC: 89 MG/DL (ref 70–110)
HCT VFR BLD AUTO: 49.8 % (ref 40–54)
HGB BLD-MCNC: 16.2 G/DL (ref 14–18)
IMM GRANULOCYTES # BLD AUTO: 0.03 K/UL (ref 0–0.04)
IMM GRANULOCYTES NFR BLD AUTO: 0.3 % (ref 0–0.5)
LYMPHOCYTES # BLD AUTO: 3.4 K/UL (ref 1–4.8)
LYMPHOCYTES NFR BLD: 35.6 % (ref 18–48)
MCH RBC QN AUTO: 31.2 PG (ref 27–31)
MCHC RBC AUTO-ENTMCNC: 32.5 G/DL (ref 32–36)
MCV RBC AUTO: 96 FL (ref 82–98)
MONOCYTES # BLD AUTO: 1 K/UL (ref 0.3–1)
MONOCYTES NFR BLD: 10.6 % (ref 4–15)
NEUTROPHILS # BLD AUTO: 4.8 K/UL (ref 1.8–7.7)
NEUTROPHILS NFR BLD: 50.1 % (ref 38–73)
NRBC BLD-RTO: 0 /100 WBC
PLATELET # BLD AUTO: 171 K/UL (ref 150–450)
PMV BLD AUTO: 10.5 FL (ref 9.2–12.9)
POTASSIUM SERPL-SCNC: 4.6 MMOL/L (ref 3.5–5.1)
PROT SERPL-MCNC: 6.8 G/DL (ref 6–8.4)
RBC # BLD AUTO: 5.19 M/UL (ref 4.6–6.2)
SODIUM SERPL-SCNC: 142 MMOL/L (ref 136–145)
WBC # BLD AUTO: 9.64 K/UL (ref 3.9–12.7)

## 2023-06-01 PROCEDURE — 81206 BCR/ABL1 GENE MAJOR BP: CPT | Performed by: INTERNAL MEDICINE

## 2023-06-01 PROCEDURE — 3078F DIAST BP <80 MM HG: CPT | Mod: CPTII,,, | Performed by: INTERNAL MEDICINE

## 2023-06-01 PROCEDURE — 3078F PR MOST RECENT DIASTOLIC BLOOD PRESSURE < 80 MM HG: ICD-10-PCS | Mod: CPTII,,, | Performed by: INTERNAL MEDICINE

## 2023-06-01 PROCEDURE — 3074F PR MOST RECENT SYSTOLIC BLOOD PRESSURE < 130 MM HG: ICD-10-PCS | Mod: CPTII,,, | Performed by: INTERNAL MEDICINE

## 2023-06-01 PROCEDURE — 99214 PR OFFICE/OUTPT VISIT, EST, LEVL IV, 30-39 MIN: ICD-10-PCS | Mod: S$PBB,,, | Performed by: INTERNAL MEDICINE

## 2023-06-01 PROCEDURE — 3008F PR BODY MASS INDEX (BMI) DOCUMENTED: ICD-10-PCS | Mod: CPTII,,, | Performed by: INTERNAL MEDICINE

## 2023-06-01 PROCEDURE — 85025 COMPLETE CBC W/AUTO DIFF WBC: CPT | Performed by: INTERNAL MEDICINE

## 2023-06-01 PROCEDURE — 80053 COMPREHEN METABOLIC PANEL: CPT | Performed by: INTERNAL MEDICINE

## 2023-06-01 PROCEDURE — 99999 PR PBB SHADOW E&M-EST. PATIENT-LVL III: CPT | Mod: PBBFAC,,, | Performed by: INTERNAL MEDICINE

## 2023-06-01 PROCEDURE — 99214 OFFICE O/P EST MOD 30 MIN: CPT | Mod: S$PBB,,, | Performed by: INTERNAL MEDICINE

## 2023-06-01 PROCEDURE — 99999 PR PBB SHADOW E&M-EST. PATIENT-LVL III: ICD-10-PCS | Mod: PBBFAC,,, | Performed by: INTERNAL MEDICINE

## 2023-06-01 PROCEDURE — 99213 OFFICE O/P EST LOW 20 MIN: CPT | Mod: PBBFAC | Performed by: INTERNAL MEDICINE

## 2023-06-01 PROCEDURE — 3008F BODY MASS INDEX DOCD: CPT | Mod: CPTII,,, | Performed by: INTERNAL MEDICINE

## 2023-06-01 PROCEDURE — 3074F SYST BP LT 130 MM HG: CPT | Mod: CPTII,,, | Performed by: INTERNAL MEDICINE

## 2023-06-01 PROCEDURE — 1159F PR MEDICATION LIST DOCUMENTED IN MEDICAL RECORD: ICD-10-PCS | Mod: CPTII,,, | Performed by: INTERNAL MEDICINE

## 2023-06-01 PROCEDURE — 1159F MED LIST DOCD IN RCRD: CPT | Mod: CPTII,,, | Performed by: INTERNAL MEDICINE

## 2023-06-01 RX ORDER — ALLOPURINOL 300 MG/1
300 TABLET ORAL DAILY
Qty: 30 TABLET | Refills: 3 | Status: SHIPPED | OUTPATIENT
Start: 2023-06-01 | End: 2023-10-27 | Stop reason: SDUPTHER

## 2023-06-01 NOTE — PROGRESS NOTES
HEMATOLOGIC MALIGNANCIES CONSULT NOTE    IDENTIFYING STATEMENT   Linh Schmidt (Linh) is a 52 y.o. male with a  of 1970 from [facility],  for evaluation of leukocytosis.     Leukocytosis   2023: ED visit for left lateral abdominal pain. Abdominal Xray shows splenomegaly.   2023: Sent to the emergency department for abnormal labs. Left flank pain x 2 months.  K, Hgb 11.2 g/dL, Plt 143 K,  Cr 1.4, normal LFTs. Abdominal US with hepatosplenomegaly. Patient given hydrea and allopurinol, unable to transfer to [facility] due to diversion.   2023: Presents to [facility] ED. Improved WBC with hydrea. WBC 297K, Hgb 10.9 g/dL, 140K. Differential with basophilia, myelocytes and metamyelocytes.   2023: Bone marrow biopsy prelim is reported as chronic myeloid leukemia, in chronic phase. BCR/ABL p210 transcript > 55%.   3/8/2023: Started dasatinib   2. HLD  3. Depression  4. Testosterone deficiency   5. Headaches     HISTORY OF PRESENT ILLNESS:    Patient presents alone.     Presents 3 month follow up after starting dasatinib.     He reports that he feels well. Denies fevers, chills, sweats, n/v, LAD. Mild diarrhea, also has energy drinks. Denies abdominal pain.     Past Medical History:   Diagnosis Date    Anxiety     GERD (gastroesophageal reflux disease)     Hypertension     Osteoarthritis        Family History   Problem Relation Age of Onset    Mental illness Mother     Diabetes Maternal Grandmother     Heart disease Maternal Grandfather     Hyperlipidemia Maternal Grandfather     Stroke Maternal Grandfather        Social History     Socioeconomic History    Marital status:    Tobacco Use    Smoking status: Former    Smokeless tobacco: Never   Substance and Sexual Activity    Alcohol use: No    Drug use: No    Sexual activity: Yes     Partners: Female   Other Topics Concern    Patient feels they ought to cut down on drinking/drug use No    Patient annoyed by others criticizing their  drinking/drug use No    Patient has felt bad or guilty about drinking/drug use No    Patient has had a drink/used drugs as an eye opener in the AM No         MEDICATIONS:     Current Outpatient Medications on File Prior to Visit   Medication Sig Dispense Refill    atenoloL (TENORMIN) 50 MG tablet TAKE 1 TABLET BY MOUTH 2 TIMES DAILY. 60 tablet 1    atorvastatin (LIPITOR) 40 MG tablet Take 1 tablet (40 mg total) by mouth once daily. 90 tablet 3    butalbital-acetaminophen-caffeine -40 mg (FIORICET, ESGIC) -40 mg per tablet TAKE 1 TABLET BY MOUTH EVERY 6 HOURS AS NEEDED FOR PAIN OR FOR HEADACHE 120 tablet 0    dasatinib (SPRYCEL) 100 mg Take 1 tablet (100 mg total) by mouth once daily. 30 tablet 0    dextroamphetamine-amphetamine (ADDERALL) 20 mg tablet Take 1 tablet by mouth 3 (three) times daily.      FLUoxetine 40 MG capsule Take 40 mg by mouth.      ibuprofen (ADVIL,MOTRIN) 800 MG tablet Take 800 mg by mouth every 8 (eight) hours as needed.      multivitamin capsule Take 1 capsule by mouth once daily.      tamsulosin (FLOMAX) 0.4 mg Cap Take 1 capsule (0.4 mg total) by mouth once daily. 30 capsule 0    testosterone cypionate (DEPOTESTOTERONE CYPIONATE) 200 mg/mL injection INJECT 1 ML INTRAMUSCULARLY EVERY WEEK  1     No current facility-administered medications on file prior to visit.       ALLERGIES: Review of patient's allergies indicates:  No Known Allergies     ROS:     Constitutional: Negative for fatigue. Negative for activity change, appetite change, chills, fever and unexpected weight change.   HENT: Negative for nosebleeds and sore throat.    Respiratory: Negative for cough, chest tightness, shortness of breath and wheezing.    Cardiovascular: Negative for leg swelling. Negative for palpitations.   Gastrointestinal: Negative for constipation. Improved abdominal pain. Denies diarrhea, nausea and vomiting. Mild diarrhea occurs infrequently.   Musculoskeletal: Negative for arthralgias, joint  "swelling and myalgias.   Hematological: Negative for adenopathy.       PHYSICAL EXAM:  Vitals:    06/01/23 1516   BP: 117/60   Pulse: 68   Resp: 18   Temp: 98.5 °F (36.9 °C)   TempSrc: Oral   SpO2: 96%   Weight: 84.9 kg (187 lb 4.5 oz)   Height: 5' 9" (1.753 m)   PainSc: 0-No pain       Physical Exam  Constitutional:       Appearance: Normal appearance.   HENT:      Head: Normocephalic and atraumatic.      Nose: Nose normal.   Eyes:      Extraocular Movements: Extraocular movements intact.      Pupils: Pupils are equal, round, and reactive to light.   Cardiovascular:      Rate and Rhythm: Normal rate and regular rhythm.   Pulmonary:      Effort: Pulmonary effort is normal.      Breath sounds: Normal breath sounds.   Abdominal:      Comments: Abdominal distention improved. Splenomegaly resolved.    Skin:     General: Skin is warm and dry.   Neurological:      General: No focal deficit present.      Mental Status: He is alert and oriented to person, place, and time.   Psychiatric:         Mood and Affect: Mood normal.         Behavior: Behavior normal.     LAB:   Results for orders placed or performed in visit on 05/29/23   CBC W/ AUTO DIFFERENTIAL   Result Value Ref Range    WBC 12.13 3.90 - 12.70 K/uL    RBC 4.94 4.60 - 6.20 M/uL    Hemoglobin 16.0 14.0 - 18.0 g/dL    Hematocrit 49.2 40.0 - 54.0 %     (H) 82 - 98 fL    MCH 32.4 (H) 27.0 - 31.0 pg    MCHC 32.5 32.0 - 36.0 g/dL    RDW 16.2 (H) 11.5 - 14.5 %    Platelets 193 150 - 450 K/uL    MPV 10.3 9.2 - 12.9 fL    Immature Granulocytes 0.3 0.0 - 0.5 %    Gran # (ANC) 5.6 1.8 - 7.7 K/uL    Immature Grans (Abs) 0.04 0.00 - 0.04 K/uL    Lymph # 4.5 1.0 - 4.8 K/uL    Mono # 1.4 (H) 0.3 - 1.0 K/uL    Eos # 0.5 0.0 - 0.5 K/uL    Baso # 0.06 0.00 - 0.20 K/uL    nRBC 0 0 /100 WBC    Gran % 46.1 38.0 - 73.0 %    Lymph % 37.3 18.0 - 48.0 %    Mono % 11.8 4.0 - 15.0 %    Eosinophil % 4.0 0.0 - 8.0 %    Basophil % 0.5 0.0 - 1.9 %    Differential Method Automated  "   COMPREHENSIVE METABOLIC PANEL   Result Value Ref Range    Sodium 142 136 - 145 mmol/L    Potassium 4.3 3.5 - 5.1 mmol/L    Chloride 108 95 - 110 mmol/L    CO2 27 23 - 29 mmol/L    Glucose 115 (H) 70 - 110 mg/dL    BUN 14 6 - 20 mg/dL    Creatinine 1.3 0.5 - 1.4 mg/dL    Calcium 9.0 8.7 - 10.5 mg/dL    Total Protein 6.5 6.0 - 8.4 g/dL    Albumin 3.8 3.5 - 5.2 g/dL    Total Bilirubin 0.3 0.1 - 1.0 mg/dL    Alkaline Phosphatase 67 55 - 135 U/L    AST 21 10 - 40 U/L    ALT 39 10 - 44 U/L    Anion Gap 7 (L) 8 - 16 mmol/L    eGFR >60.0 >60 mL/min/1.73 m^2   URIC ACID   Result Value Ref Range    Uric Acid 4.3 3.4 - 7.0 mg/dL       PROBLEMS ASSESSED THIS VISIT:    1. Chronic myeloid leukemia        IMPRESSION:    1. Chronic myeloid leukemia with hematological response    PLAN:       Patient presents for 3 month follow up for chronic myeloid leukemia in chronic phase. Currently with complete hematological response. BCR/ABL pending.    Tolerating dasatinib well. No toxicities.     Repeat cbc/cmp in 6 weeks. Then 3 months.     Allopurinol 300mg daily.     Route Chart for Scheduling    BMT Chart Routing      Follow up with physician 4 months.   Follow up with [facility]    Provider visit type    Infusion scheduling note    Injection scheduling note    Labs CBC and CMP   Scheduling:  Preferred lab:  Lab interval:  cbc, cmp in 6 weeks, then in 3 months with BCR/ABL. Test in [facility].   Imaging    Pharmacy appointment    Other referrals            Naty Underwood MD  Hematology/Oncology Fellow PGY V  [facility]

## 2023-06-02 ENCOUNTER — PATIENT MESSAGE (OUTPATIENT)
Dept: PHARMACY | Facility: CLINIC | Age: 53
End: 2023-06-02
Payer: MEDICAID

## 2023-06-05 ENCOUNTER — PATIENT MESSAGE (OUTPATIENT)
Dept: PHARMACY | Facility: CLINIC | Age: 53
End: 2023-06-05
Payer: MEDICAID

## 2023-06-06 LAB
BCR/ABL,P210 RESULT: NORMAL
PATH REPORT.FINAL DX SPEC: NORMAL
SPECIMEN TYPE: NORMAL

## 2023-06-09 ENCOUNTER — SPECIALTY PHARMACY (OUTPATIENT)
Dept: PHARMACY | Facility: CLINIC | Age: 53
End: 2023-06-09
Payer: MEDICAID

## 2023-06-09 DIAGNOSIS — C92.10 CHRONIC MYELOID LEUKEMIA: ICD-10-CM

## 2023-06-09 NOTE — TELEPHONE ENCOUNTER
Outgoing call: Pt stated he has about 6days on hand. RR sent, will follow up with pt once approved.

## 2023-06-12 NOTE — TELEPHONE ENCOUNTER
Specialty Pharmacy - Refill Coordination    Specialty Medication Orders Linked to Encounter      Flowsheet Row Most Recent Value   Medication #1 dasatinib (SPRYCEL) 100 mg (Order#[id number], Rx#[id number])            Refill Questions - Documented Responses      Flowsheet Row Most Recent Value   Patient Availability and HIPAA Verification    Does patient want to proceed with activity? Yes   HIPAA/medical authority confirmed? Yes   Relationship to patient of person spoken to? Self   Refill Screening Questions    Changes to allergies? No   Changes to medications? No   New conditions since last clinic visit? No   Unplanned office visit, urgent care, ED, or hospital admission in the last 4 weeks? No   How does patient/caregiver feel medication is working? Very good   Financial problems or insurance changes? No   How many doses of your specialty medications were missed in the last 4 weeks? 0   Would patient like to speak to a pharmacist? No   When does the patient need to receive the medication? 06/16/23   Refill Delivery Questions    How will the patient receive the medication? MEDRx   When does the patient need to receive the medication? 06/16/23   Shipping Address Home   Address in [facility] confirmed and updated if neccessary? Yes   Expected Copay ($) 0   Is the patient able to afford the medication copay? Yes   Payment Method zero copay   Days supply of Refill 30   Supplies needed? No supplies needed   Refill activity completed? Yes   Refill activity plan Refill scheduled   Shipment/Pickup Date: 06/13/23            Current Outpatient Medications   Medication Sig    allopurinoL (ZYLOPRIM) 300 MG tablet Take 1 tablet (300 mg total) by mouth once daily.    atenoloL (TENORMIN) 50 MG tablet TAKE 1 TABLET BY MOUTH 2 TIMES DAILY.    atorvastatin (LIPITOR) 40 MG tablet Take 1 tablet (40 mg total) by mouth once daily.    butalbital-acetaminophen-caffeine -40 mg (FIORICET, ESGIC) -40 mg per tablet TAKE 1  TABLET BY MOUTH EVERY 6 HOURS AS NEEDED FOR PAIN OR FOR HEADACHE    dasatinib (SPRYCEL) 100 mg Take 1 tablet (100 mg total) by mouth once daily.    dextroamphetamine-amphetamine (ADDERALL) 20 mg tablet Take 1 tablet by mouth 3 (three) times daily.    FLUoxetine 40 MG capsule Take 40 mg by mouth.    ibuprofen (ADVIL,MOTRIN) 800 MG tablet Take 800 mg by mouth every 8 (eight) hours as needed.    multivitamin capsule Take 1 capsule by mouth once daily.    tamsulosin (FLOMAX) 0.4 mg Cap Take 1 capsule (0.4 mg total) by mouth once daily.    testosterone cypionate (DEPOTESTOTERONE CYPIONATE) 200 mg/mL injection INJECT 1 ML INTRAMUSCULARLY EVERY WEEK   Last reviewed on 6/1/2023  3:16 PM by Harika Tyler MA    Review of patient's allergies indicates:  No Known Allergies Last reviewed on  6/1/2023 3:16 PM by Harika Tyler      Tasks added this encounter   No tasks added.   Tasks due within next 3 months   8/28/2023 - Clinical Assessment (6 month recurrence)     Killian Evans, PharmD  [facility]  [address] [place] [id number]  Phone: [phone]  Fax: [phone]

## 2023-06-18 DIAGNOSIS — R33.9 URINARY RETENTION: ICD-10-CM

## 2023-06-19 RX ORDER — TAMSULOSIN HYDROCHLORIDE 0.4 MG/1
0.4 CAPSULE ORAL DAILY
Qty: 30 CAPSULE | Refills: 0 | Status: SHIPPED | OUTPATIENT
Start: 2023-06-19 | End: 2023-07-28

## 2023-07-06 ENCOUNTER — SPECIALTY PHARMACY (OUTPATIENT)
Dept: PHARMACY | Facility: CLINIC | Age: 53
End: 2023-07-06
Payer: MEDICAID

## 2023-07-06 DIAGNOSIS — C92.10 CHRONIC MYELOID LEUKEMIA: ICD-10-CM

## 2023-07-06 NOTE — TELEPHONE ENCOUNTER
Outgoing call regarding refill Sprycel. Faxed [facility] for refills. Pt has a 2 week supply left on hand. Will follow up once refills are approve.

## 2023-07-07 ENCOUNTER — PATIENT MESSAGE (OUTPATIENT)
Dept: PHARMACY | Facility: CLINIC | Age: 53
End: 2023-07-07
Payer: MEDICAID

## 2023-07-13 NOTE — TELEPHONE ENCOUNTER
Specialty Pharmacy - Refill Coordination    Specialty Medication Orders Linked to Encounter      Flowsheet Row Most Recent Value   Medication #1 dasatinib (SPRYCEL) 100 mg (Order#[id number], Rx#[id number])            Refill Questions - Documented Responses      Flowsheet Row Most Recent Value   Patient Availability and HIPAA Verification    Does patient want to proceed with activity? Yes   HIPAA/medical authority confirmed? Yes   Relationship to patient of person spoken to? Self   Refill Screening Questions    Changes to allergies? No   Changes to medications? No   New conditions since last clinic visit? No   Unplanned office visit, urgent care, ED, or hospital admission in the last 4 weeks? No   How does patient/caregiver feel medication is working? Good   Financial problems or insurance changes? No   How many doses of your specialty medications were missed in the last 4 weeks? 0   Would patient like to speak to a pharmacist? No   When does the patient need to receive the medication? 07/22/23   Refill Delivery Questions    How will the patient receive the medication? MEDRx   When does the patient need to receive the medication? 07/22/23   Shipping Address Home   Address in [facility] confirmed and updated if neccessary? Yes   Expected Copay ($) 0   Is the patient able to afford the medication copay? Yes   Payment Method zero copay   Days supply of Refill 30   Supplies needed? No supplies needed   Refill activity completed? Yes   Refill activity plan Refill scheduled   Shipment/Pickup Date: 07/18/23            Current Outpatient Medications   Medication Sig    allopurinoL (ZYLOPRIM) 300 MG tablet Take 1 tablet (300 mg total) by mouth once daily.    atenoloL (TENORMIN) 50 MG tablet TAKE 1 TABLET BY MOUTH 2 TIMES DAILY.    atorvastatin (LIPITOR) 40 MG tablet Take 1 tablet (40 mg total) by mouth once daily.    butalbital-acetaminophen-caffeine -40 mg (FIORICET, ESGIC) -40 mg per tablet TAKE 1 TABLET  BY MOUTH EVERY 6 HOURS AS NEEDED FOR PAIN OR FOR HEADACHE    dasatinib (SPRYCEL) 100 mg Take 1 tablet (100 mg total) by mouth once daily.    dextroamphetamine-amphetamine (ADDERALL) 20 mg tablet Take 1 tablet by mouth 3 (three) times daily.    FLUoxetine 40 MG capsule Take 40 mg by mouth.    ibuprofen (ADVIL,MOTRIN) 800 MG tablet Take 800 mg by mouth every 8 (eight) hours as needed.    multivitamin capsule Take 1 capsule by mouth once daily.    tamsulosin (FLOMAX) 0.4 mg Cap Take 1 capsule (0.4 mg total) by mouth once daily.    testosterone cypionate (DEPOTESTOTERONE CYPIONATE) 200 mg/mL injection INJECT 1 ML INTRAMUSCULARLY EVERY WEEK   Last reviewed on 6/1/2023  3:16 PM by Harika Tyler MA    Review of patient's allergies indicates:  No Known Allergies Last reviewed on  6/1/2023 3:16 PM by Harika Tyler      Tasks added this encounter   No tasks added.   Tasks due within next 3 months   8/28/2023 - Clinical Assessment (6 month recurrence)  7/13/2023 - Refill Coordination Outreach (1 time occurrence)     [facility]  [address]  [facility] [address]  Phone: [phone]  Fax: [phone]

## 2023-07-28 DIAGNOSIS — R33.9 URINARY RETENTION: ICD-10-CM

## 2023-07-28 RX ORDER — TAMSULOSIN HYDROCHLORIDE 0.4 MG/1
CAPSULE ORAL
Qty: 30 CAPSULE | Refills: 0 | Status: SHIPPED | OUTPATIENT
Start: 2023-07-28 | End: 2023-08-28 | Stop reason: SDUPTHER

## 2023-08-10 ENCOUNTER — SPECIALTY PHARMACY (OUTPATIENT)
Dept: PHARMACY | Facility: CLINIC | Age: 53
End: 2023-08-10
Payer: MEDICAID

## 2023-08-10 DIAGNOSIS — C92.10 CHRONIC MYELOID LEUKEMIA: ICD-10-CM

## 2023-08-10 NOTE — TELEPHONE ENCOUNTER
Outgoing call: Patient stated he has 10 on hand, informed pt that a refill request was sent to his MD and once approved [facility] will follow up.

## 2023-08-14 NOTE — TELEPHONE ENCOUNTER
Specialty Pharmacy - Refill Coordination    Specialty Medication Orders Linked to Encounter      Flowsheet Row Most Recent Value   Medication #1 dasatinib (SPRYCEL) 100 mg (Order#[id number], Rx#[id number])            Refill Questions - Documented Responses      Flowsheet Row Most Recent Value   Patient Availability and HIPAA Verification    Does patient want to proceed with activity? Yes   HIPAA/medical authority confirmed? Yes   Relationship to patient of person spoken to? Self   Refill Screening Questions    Changes to allergies? No   Changes to medications? No   New conditions since last clinic visit? No   Unplanned office visit, urgent care, ED, or hospital admission in the last 4 weeks? No   How does patient/caregiver feel medication is working? Good   Financial problems or insurance changes? No   How many doses of your specialty medications were missed in the last 4 weeks? 0   Would patient like to speak to a pharmacist? No   When does the patient need to receive the medication? 08/21/23   Refill Delivery Questions    How will the patient receive the medication? MEDRx   When does the patient need to receive the medication? 08/21/23   Shipping Address Home   Address in [facility] confirmed and updated if neccessary? Yes   Expected Copay ($) 0   Is the patient able to afford the medication copay? Yes   Payment Method zero copay   Days supply of Refill 30   Supplies needed? No supplies needed   Refill activity completed? Yes   Refill activity plan Refill scheduled   Shipment/Pickup Date: 08/17/23            Current Outpatient Medications   Medication Sig    allopurinoL (ZYLOPRIM) 300 MG tablet Take 1 tablet (300 mg total) by mouth once daily.    atenoloL (TENORMIN) 50 MG tablet TAKE 1 TABLET BY MOUTH 2 TIMES DAILY.    atorvastatin (LIPITOR) 40 MG tablet Take 1 tablet (40 mg total) by mouth once daily.    butalbital-acetaminophen-caffeine -40 mg (FIORICET, ESGIC) -40 mg per tablet TAKE 1 TABLET  BY MOUTH EVERY 6 HOURS AS NEEDED FOR PAIN OR FOR HEADACHE    dasatinib (SPRYCEL) 100 mg Take 1 tablet (100 mg total) by mouth once daily.    dextroamphetamine-amphetamine (ADDERALL) 20 mg tablet Take 1 tablet by mouth 3 (three) times daily.    FLUoxetine 40 MG capsule Take 40 mg by mouth.    ibuprofen (ADVIL,MOTRIN) 800 MG tablet Take 800 mg by mouth every 8 (eight) hours as needed.    multivitamin capsule Take 1 capsule by mouth once daily.    tamsulosin (FLOMAX) 0.4 mg Cap TAKE 1 CAPSULE BY MOUTH ONCE DAILY.    testosterone cypionate (DEPOTESTOTERONE CYPIONATE) 200 mg/mL injection INJECT 1 ML INTRAMUSCULARLY EVERY WEEK   Last reviewed on 6/1/2023  3:16 PM by Harika Tyler MA    Review of patient's allergies indicates:  No Known Allergies Last reviewed on  6/1/2023 3:16 PM by Harika Tyler      Tasks added this encounter   No tasks added.   Tasks due within next 3 months   8/28/2023 - Clinical Assessment (6 month recurrence)  8/13/2023 - Refill Coordination Outreach (1 time occurrence)     [facility]  [address] [facility]  [facility] [address]  Phone: [phone]  Fax: [phone]

## 2023-08-28 DIAGNOSIS — R33.9 URINARY RETENTION: ICD-10-CM

## 2023-08-28 RX ORDER — TAMSULOSIN HYDROCHLORIDE 0.4 MG/1
1 CAPSULE ORAL DAILY
Qty: 30 CAPSULE | Refills: 0 | Status: SHIPPED | OUTPATIENT
Start: 2023-08-28 | End: 2023-09-26 | Stop reason: SDUPTHER

## 2023-09-08 DIAGNOSIS — C92.10 CHRONIC MYELOID LEUKEMIA: ICD-10-CM

## 2023-09-18 ENCOUNTER — PATIENT MESSAGE (OUTPATIENT)
Dept: PRIMARY CARE CLINIC | Facility: CLINIC | Age: 53
End: 2023-09-18
Payer: MEDICAID

## 2023-09-26 DIAGNOSIS — R33.9 URINARY RETENTION: ICD-10-CM

## 2023-09-26 PROBLEM — C92.10 CML (CHRONIC MYELOCYTIC LEUKEMIA): Status: ACTIVE | Noted: 2023-09-26

## 2023-09-26 RX ORDER — TAMSULOSIN HYDROCHLORIDE 0.4 MG/1
1 CAPSULE ORAL DAILY
Qty: 30 CAPSULE | Refills: 0 | Status: SHIPPED | OUTPATIENT
Start: 2023-09-26 | End: 2023-11-01 | Stop reason: SDUPTHER

## 2023-10-05 ENCOUNTER — TELEPHONE (OUTPATIENT)
Dept: HEMATOLOGY/ONCOLOGY | Facility: CLINIC | Age: 53
End: 2023-10-05
Payer: MEDICAID

## 2023-10-05 DIAGNOSIS — E87.5 HYPERKALEMIA: Primary | ICD-10-CM

## 2023-10-05 NOTE — TELEPHONE ENCOUNTER
Called patient to discuss lab results. Did not . Unable leave voicemail. Repeat labs ordered on 10/4, no show. Will ask schedulers to reschedule labs.     Sent lokelma to preferred pharmacy.

## 2023-10-09 DIAGNOSIS — C92.10 CHRONIC MYELOID LEUKEMIA: ICD-10-CM

## 2023-10-12 ENCOUNTER — LAB VISIT (OUTPATIENT)
Dept: LAB | Facility: HOSPITAL | Age: 53
End: 2023-10-12
Payer: MEDICAID

## 2023-10-12 ENCOUNTER — OFFICE VISIT (OUTPATIENT)
Dept: HEMATOLOGY/ONCOLOGY | Facility: CLINIC | Age: 53
End: 2023-10-12
Payer: MEDICAID

## 2023-10-12 VITALS
WEIGHT: 207.56 LBS | BODY MASS INDEX: 30.74 KG/M2 | OXYGEN SATURATION: 97 % | TEMPERATURE: 99 F | RESPIRATION RATE: 18 BRPM | SYSTOLIC BLOOD PRESSURE: 138 MMHG | HEIGHT: 69 IN | HEART RATE: 63 BPM | DIASTOLIC BLOOD PRESSURE: 74 MMHG

## 2023-10-12 DIAGNOSIS — C92.10 CHRONIC MYELOID LEUKEMIA: ICD-10-CM

## 2023-10-12 DIAGNOSIS — C92.10 CHRONIC MYELOID LEUKEMIA: Primary | ICD-10-CM

## 2023-10-12 LAB
ALBUMIN SERPL BCP-MCNC: 4 G/DL (ref 3.5–5.2)
ALP SERPL-CCNC: 76 U/L (ref 55–135)
ALT SERPL W/O P-5'-P-CCNC: 54 U/L (ref 10–44)
ANION GAP SERPL CALC-SCNC: 8 MMOL/L (ref 8–16)
AST SERPL-CCNC: 50 U/L (ref 10–40)
BASOPHILS # BLD AUTO: 0.05 K/UL (ref 0–0.2)
BASOPHILS NFR BLD: 0.5 % (ref 0–1.9)
BILIRUB SERPL-MCNC: 0.6 MG/DL (ref 0.1–1)
BUN SERPL-MCNC: 19 MG/DL (ref 6–20)
CALCIUM SERPL-MCNC: 9.3 MG/DL (ref 8.7–10.5)
CHLORIDE SERPL-SCNC: 104 MMOL/L (ref 95–110)
CO2 SERPL-SCNC: 27 MMOL/L (ref 23–29)
CREAT SERPL-MCNC: 1.8 MG/DL (ref 0.5–1.4)
DIFFERENTIAL METHOD: ABNORMAL
EOSINOPHIL # BLD AUTO: 0.3 K/UL (ref 0–0.5)
EOSINOPHIL NFR BLD: 2.8 % (ref 0–8)
ERYTHROCYTE [DISTWIDTH] IN BLOOD BY AUTOMATED COUNT: 13.8 % (ref 11.5–14.5)
EST. GFR  (NO RACE VARIABLE): 44.7 ML/MIN/1.73 M^2
GLUCOSE SERPL-MCNC: 71 MG/DL (ref 70–110)
HCT VFR BLD AUTO: 52.2 % (ref 40–54)
HGB BLD-MCNC: 17.3 G/DL (ref 14–18)
IMM GRANULOCYTES # BLD AUTO: 0.06 K/UL (ref 0–0.04)
IMM GRANULOCYTES NFR BLD AUTO: 0.6 % (ref 0–0.5)
LYMPHOCYTES # BLD AUTO: 3 K/UL (ref 1–4.8)
LYMPHOCYTES NFR BLD: 27.6 % (ref 18–48)
MCH RBC QN AUTO: 31.6 PG (ref 27–31)
MCHC RBC AUTO-ENTMCNC: 33.1 G/DL (ref 32–36)
MCV RBC AUTO: 95 FL (ref 82–98)
MONOCYTES # BLD AUTO: 1.6 K/UL (ref 0.3–1)
MONOCYTES NFR BLD: 14.5 % (ref 4–15)
NEUTROPHILS # BLD AUTO: 5.9 K/UL (ref 1.8–7.7)
NEUTROPHILS NFR BLD: 54 % (ref 38–73)
NRBC BLD-RTO: 0 /100 WBC
PLATELET # BLD AUTO: 177 K/UL (ref 150–450)
PMV BLD AUTO: 11.1 FL (ref 9.2–12.9)
POTASSIUM SERPL-SCNC: 4.7 MMOL/L (ref 3.5–5.1)
PROT SERPL-MCNC: 7.1 G/DL (ref 6–8.4)
RBC # BLD AUTO: 5.47 M/UL (ref 4.6–6.2)
SODIUM SERPL-SCNC: 139 MMOL/L (ref 136–145)
WBC # BLD AUTO: 10.89 K/UL (ref 3.9–12.7)

## 2023-10-12 PROCEDURE — 1159F MED LIST DOCD IN RCRD: CPT | Mod: CPTII,,, | Performed by: INTERNAL MEDICINE

## 2023-10-12 PROCEDURE — 99214 PR OFFICE/OUTPT VISIT, EST, LEVL IV, 30-39 MIN: ICD-10-PCS | Mod: S$PBB,,, | Performed by: INTERNAL MEDICINE

## 2023-10-12 PROCEDURE — 85025 COMPLETE CBC W/AUTO DIFF WBC: CPT | Performed by: INTERNAL MEDICINE

## 2023-10-12 PROCEDURE — 99999 PR PBB SHADOW E&M-EST. PATIENT-LVL III: ICD-10-PCS | Mod: PBBFAC,,, | Performed by: INTERNAL MEDICINE

## 2023-10-12 PROCEDURE — 99999 PR PBB SHADOW E&M-EST. PATIENT-LVL III: CPT | Mod: PBBFAC,,, | Performed by: INTERNAL MEDICINE

## 2023-10-12 PROCEDURE — 99214 OFFICE O/P EST MOD 30 MIN: CPT | Mod: S$PBB,,, | Performed by: INTERNAL MEDICINE

## 2023-10-12 PROCEDURE — 3008F BODY MASS INDEX DOCD: CPT | Mod: CPTII,,, | Performed by: INTERNAL MEDICINE

## 2023-10-12 PROCEDURE — 1159F PR MEDICATION LIST DOCUMENTED IN MEDICAL RECORD: ICD-10-PCS | Mod: CPTII,,, | Performed by: INTERNAL MEDICINE

## 2023-10-12 PROCEDURE — 3008F PR BODY MASS INDEX (BMI) DOCUMENTED: ICD-10-PCS | Mod: CPTII,,, | Performed by: INTERNAL MEDICINE

## 2023-10-12 PROCEDURE — 80053 COMPREHEN METABOLIC PANEL: CPT | Performed by: INTERNAL MEDICINE

## 2023-10-12 PROCEDURE — 99213 OFFICE O/P EST LOW 20 MIN: CPT | Mod: PBBFAC | Performed by: INTERNAL MEDICINE

## 2023-10-12 PROCEDURE — 3078F DIAST BP <80 MM HG: CPT | Mod: CPTII,,, | Performed by: INTERNAL MEDICINE

## 2023-10-12 PROCEDURE — 3075F PR MOST RECENT SYSTOLIC BLOOD PRESS GE 130-139MM HG: ICD-10-PCS | Mod: CPTII,,, | Performed by: INTERNAL MEDICINE

## 2023-10-12 PROCEDURE — 36415 COLL VENOUS BLD VENIPUNCTURE: CPT | Performed by: INTERNAL MEDICINE

## 2023-10-12 PROCEDURE — 3078F PR MOST RECENT DIASTOLIC BLOOD PRESSURE < 80 MM HG: ICD-10-PCS | Mod: CPTII,,, | Performed by: INTERNAL MEDICINE

## 2023-10-12 PROCEDURE — 3075F SYST BP GE 130 - 139MM HG: CPT | Mod: CPTII,,, | Performed by: INTERNAL MEDICINE

## 2023-10-12 RX ORDER — SODIUM ZIRCONIUM CYCLOSILICATE 10 G/10G
10 POWDER, FOR SUSPENSION ORAL 3 TIMES DAILY
COMMUNITY
Start: 2023-10-09

## 2023-10-12 NOTE — PROGRESS NOTES
HEMATOLOGIC MALIGNANCIES CONSULT NOTE    IDENTIFYING STATEMENT   Linh Schmidt (Linh) is a 53 y.o. male with a  of 1970 from [facility],  for evaluation of leukocytosis.     Leukocytosis   2023: ED visit for left lateral abdominal pain. Abdominal Xray shows splenomegaly.   2023: Sent to the emergency department for abnormal labs. Left flank pain x 2 months.  K, Hgb 11.2 g/dL, Plt 143 K,  Cr 1.4, normal LFTs. Abdominal US with hepatosplenomegaly. Patient given hydrea and allopurinol, unable to transfer to [facility] due to diversion.   2023: Presents to [facility] ED. Improved WBC with hydrea. WBC 297K, Hgb 10.9 g/dL, 140K. Differential with basophilia, myelocytes and metamyelocytes.   2023: Bone marrow biopsy prelim is reported as chronic myeloid leukemia, in chronic phase. BCR/ABL p210 transcript > 55%.   3/8/2023: Started dasatinib   2. HLD  3. Depression  4. Testosterone deficiency   5. Headaches     HISTORY OF PRESENT ILLNESS:    Patient presents with wife.    He reports feeling well. He reports that he feels well. Denies fevers, chills, sweats, SOB, chest pain, edema, n/v, LAD.     Presents for 7 months follow up after starting dasatinib. He is in major molecular response of 0.05.       Past Medical History:   Diagnosis Date    Anxiety     GERD (gastroesophageal reflux disease)     Hypertension     Osteoarthritis        Family History   Problem Relation Age of Onset    Mental illness Mother     Diabetes Maternal Grandmother     Heart disease Maternal Grandfather     Hyperlipidemia Maternal Grandfather     Stroke Maternal Grandfather        Social History     Socioeconomic History    Marital status:    Tobacco Use    Smoking status: Former    Smokeless tobacco: Never   Substance and Sexual Activity    Alcohol use: No    Drug use: No    Sexual activity: Yes     Partners: Female   Other Topics Concern    Patient feels they ought to cut down on drinking/drug use No    Patient  annoyed by others criticizing their drinking/drug use No    Patient has felt bad or guilty about drinking/drug use No    Patient has had a drink/used drugs as an eye opener in the AM No         MEDICATIONS:     Current Outpatient Medications on File Prior to Visit   Medication Sig Dispense Refill    allopurinoL (ZYLOPRIM) 300 MG tablet Take 1 tablet (300 mg total) by mouth once daily. 30 tablet 3    atenoloL (TENORMIN) 50 MG tablet TAKE 1 TABLET BY MOUTH 2 TIMES DAILY. 60 tablet 1    atorvastatin (LIPITOR) 40 MG tablet Take 1 tablet (40 mg total) by mouth once daily. 90 tablet 3    butalbital-acetaminophen-caffeine -40 mg (FIORICET, ESGIC) -40 mg per tablet TAKE 1 TABLET BY MOUTH EVERY 6 HOURS AS NEEDED FOR PAIN OR FOR HEADACHE 120 tablet 0    dasatinib (SPRYCEL) 100 mg Take 1 tablet (100 mg total) by mouth once daily. 30 tablet 0    dextroamphetamine-amphetamine (ADDERALL) 20 mg tablet Take 1 tablet by mouth 3 (three) times daily.      FLUoxetine 40 MG capsule Take 40 mg by mouth.      ibuprofen (ADVIL,MOTRIN) 800 MG tablet Take 800 mg by mouth every 8 (eight) hours as needed.      multivitamin capsule Take 1 capsule by mouth once daily.      tamsulosin (FLOMAX) 0.4 mg Cap Take 1 capsule (0.4 mg total) by mouth once daily. 30 capsule 0    testosterone cypionate (DEPOTESTOTERONE CYPIONATE) 200 mg/mL injection INJECT 1 ML INTRAMUSCULARLY EVERY WEEK  1     No current facility-administered medications on file prior to visit.       ALLERGIES: Review of patient's allergies indicates:  No Known Allergies     ROS:     Constitutional: Negative for fatigue. Negative for activity change, appetite change, chills, fever and unexpected weight change.   HENT: Negative for nosebleeds and sore throat.    Respiratory: Negative for cough, chest tightness, shortness of breath and wheezing.    Cardiovascular: Negative for leg swelling. Negative for palpitations.   Gastrointestinal: Negative for constipation.Denies diarrhea,  "nausea and vomiting.   Musculoskeletal: Negative for arthralgias, joint swelling and myalgias.   Hematological: Negative for adenopathy.       PHYSICAL EXAM:  Vitals:    10/12/23 1502   BP: 138/74   Pulse: 63   Resp: 18   Temp: 98.6 °F (37 °C)   TempSrc: Oral   SpO2: 97%   Weight: 94.2 kg (207 lb 9 oz)   Height: 5' 9" (1.753 m)   PainSc: 0-No pain         Physical Exam  Constitutional:       Appearance: Normal appearance.   HENT:      Head: Normocephalic and atraumatic.      Nose: Nose normal.   Eyes:      Extraocular Movements: Extraocular movements intact.      Pupils: Pupils are equal, round, and reactive to light.   Cardiovascular:      Rate and Rhythm: Normal rate and regular rhythm.   Pulmonary:      Effort: Pulmonary effort is normal.      Breath sounds: Normal breath sounds.   Abdominal:      Comments: Abdominal distention improved. Splenomegaly resolved.    Skin:     General: Skin is warm and dry.   Neurological:      General: No focal deficit present.      Mental Status: He is alert and oriented to person, place, and time.   Psychiatric:         Mood and Affect: Mood normal.         Behavior: Behavior normal.     LAB:   Results for orders placed or performed in visit on 10/03/23   CBC W/ AUTO DIFFERENTIAL   Result Value Ref Range    WBC 9.24 3.90 - 12.70 K/uL    RBC 5.43 4.60 - 6.20 M/uL    Hemoglobin 17.1 14.0 - 18.0 g/dL    Hematocrit 52.9 40.0 - 54.0 %    MCV 97 82 - 98 fL    MCH 31.5 (H) 27.0 - 31.0 pg    MCHC 32.3 32.0 - 36.0 g/dL    RDW 13.8 11.5 - 14.5 %    Platelets 185 150 - 450 K/uL    MPV 11.2 9.2 - 12.9 fL    Immature Granulocytes 1.3 (H) 0.0 - 0.5 %    Gran # (ANC) 6.2 1.8 - 7.7 K/uL    Immature Grans (Abs) 0.12 (H) 0.00 - 0.04 K/uL    Lymph # 1.8 1.0 - 4.8 K/uL    Mono # 0.8 0.3 - 1.0 K/uL    Eos # 0.3 0.0 - 0.5 K/uL    Baso # 0.04 0.00 - 0.20 K/uL    nRBC 0 0 /100 WBC    Gran % 66.9 38.0 - 73.0 %    Lymph % 19.8 18.0 - 48.0 %    Mono % 8.7 4.0 - 15.0 %    Eosinophil % 2.9 0.0 - 8.0 %    " Basophil % 0.4 0.0 - 1.9 %    Differential Method Automated    COMPREHENSIVE METABOLIC PANEL   Result Value Ref Range    Sodium 141 136 - 145 mmol/L    Potassium 5.7 (H) 3.5 - 5.1 mmol/L    Chloride 109 95 - 110 mmol/L    CO2 24 23 - 29 mmol/L    Glucose 83 70 - 110 mg/dL    BUN 19 6 - 20 mg/dL    Creatinine 1.6 (H) 0.5 - 1.4 mg/dL    Calcium 9.8 8.7 - 10.5 mg/dL    Total Protein 6.9 6.0 - 8.4 g/dL    Albumin 4.0 3.5 - 5.2 g/dL    Total Bilirubin 0.4 0.1 - 1.0 mg/dL    Alkaline Phosphatase 73 55 - 135 U/L    AST 36 10 - 40 U/L    ALT 49 (H) 10 - 44 U/L    eGFR 51.5 (A) >60 mL/min/1.73 m^2    Anion Gap 8 8 - 16 mmol/L   URIC ACID   Result Value Ref Range    Uric Acid 3.7 3.4 - 7.0 mg/dL   BCR/ABL, p210, Quant, Monitor, blood   Result Value Ref Range    Specimen Type, p210, Quant, bld Blood     Final Diagnosis, p210, Quant, bld SEE BELOW     BCR/ABL,p210 Result see interpretation        PROBLEMS ASSESSED THIS VISIT:    1. Chronic myeloid leukemia        IMPRESSION:    1. Chronic myeloid leukemia with hematological and molecular response    PLAN:       Patient presents for 7 month follow up for chronic myeloid leukemia in chronic phase. Currently with complete hematological response and major molecular response.   Tolerating dasatinib well. Continue therapy.     Repeat cbc/cmp/bcr/abl in 3 months.   Allopurinol 300mg daily.     Route Chart for Scheduling    BMT Chart Routing      Follow up with physician 3 months.   Follow up with [place]    Provider visit type    Infusion scheduling note    Injection scheduling note    Labs CBC and CMP   Scheduling:  Preferred lab:  Lab interval:  BCR/ABl p210 in 3 months   Imaging    Pharmacy appointment    Other referrals                  Naty Underwood MD  Hematology/Oncology Fellow PGY VI  [facility]

## 2023-10-18 ENCOUNTER — PATIENT MESSAGE (OUTPATIENT)
Dept: CARDIOLOGY | Facility: CLINIC | Age: 53
End: 2023-10-18
Payer: MEDICAID

## 2023-10-27 DIAGNOSIS — C92.10 CHRONIC MYELOID LEUKEMIA: ICD-10-CM

## 2023-10-27 RX ORDER — ALLOPURINOL 300 MG/1
300 TABLET ORAL DAILY
Qty: 30 TABLET | Refills: 3 | Status: SHIPPED | OUTPATIENT
Start: 2023-10-27 | End: 2024-02-05 | Stop reason: SDUPTHER

## 2023-11-01 DIAGNOSIS — R33.9 URINARY RETENTION: ICD-10-CM

## 2023-11-01 RX ORDER — TAMSULOSIN HYDROCHLORIDE 0.4 MG/1
1 CAPSULE ORAL DAILY
Qty: 30 CAPSULE | Refills: 0 | Status: SHIPPED | OUTPATIENT
Start: 2023-11-01 | End: 2023-12-21 | Stop reason: SDUPTHER

## 2023-11-09 DIAGNOSIS — C92.10 CHRONIC MYELOID LEUKEMIA: ICD-10-CM

## 2023-12-08 DIAGNOSIS — C92.10 CHRONIC MYELOID LEUKEMIA: ICD-10-CM

## 2023-12-21 DIAGNOSIS — R33.9 URINARY RETENTION: ICD-10-CM

## 2023-12-21 RX ORDER — TAMSULOSIN HYDROCHLORIDE 0.4 MG/1
1 CAPSULE ORAL DAILY
Qty: 30 CAPSULE | Refills: 0 | Status: SHIPPED | OUTPATIENT
Start: 2023-12-21 | End: 2024-01-23 | Stop reason: SDUPTHER

## 2024-01-10 DIAGNOSIS — C92.10 CHRONIC MYELOID LEUKEMIA: ICD-10-CM

## 2024-01-16 ENCOUNTER — OFFICE VISIT (OUTPATIENT)
Dept: HEMATOLOGY/ONCOLOGY | Facility: CLINIC | Age: 54
End: 2024-01-16
Payer: MEDICAID

## 2024-01-16 VITALS
WEIGHT: 211.31 LBS | BODY MASS INDEX: 31.3 KG/M2 | TEMPERATURE: 98 F | HEART RATE: 64 BPM | DIASTOLIC BLOOD PRESSURE: 66 MMHG | OXYGEN SATURATION: 97 % | HEIGHT: 69 IN | RESPIRATION RATE: 98 BRPM | SYSTOLIC BLOOD PRESSURE: 133 MMHG

## 2024-01-16 DIAGNOSIS — C92.10 CHRONIC MYELOID LEUKEMIA: Primary | ICD-10-CM

## 2024-01-16 PROBLEM — R07.9 CHEST PAIN: Status: RESOLVED | Noted: 2020-03-19 | Resolved: 2024-01-16

## 2024-01-16 PROCEDURE — 99214 OFFICE O/P EST MOD 30 MIN: CPT | Mod: PBBFAC | Performed by: INTERNAL MEDICINE

## 2024-01-16 PROCEDURE — 3008F BODY MASS INDEX DOCD: CPT | Mod: CPTII,,, | Performed by: INTERNAL MEDICINE

## 2024-01-16 PROCEDURE — 99214 OFFICE O/P EST MOD 30 MIN: CPT | Mod: S$PBB,,, | Performed by: INTERNAL MEDICINE

## 2024-01-16 PROCEDURE — 3075F SYST BP GE 130 - 139MM HG: CPT | Mod: CPTII,,, | Performed by: INTERNAL MEDICINE

## 2024-01-16 PROCEDURE — 99999 PR PBB SHADOW E&M-EST. PATIENT-LVL IV: CPT | Mod: PBBFAC,,, | Performed by: INTERNAL MEDICINE

## 2024-01-16 PROCEDURE — 1159F MED LIST DOCD IN RCRD: CPT | Mod: CPTII,,, | Performed by: INTERNAL MEDICINE

## 2024-01-16 PROCEDURE — 3078F DIAST BP <80 MM HG: CPT | Mod: CPTII,,, | Performed by: INTERNAL MEDICINE

## 2024-01-16 PROCEDURE — 1160F RVW MEDS BY RX/DR IN RCRD: CPT | Mod: CPTII,,, | Performed by: INTERNAL MEDICINE

## 2024-01-16 RX ORDER — LAMOTRIGINE 25 MG/1
50 TABLET ORAL NIGHTLY
COMMUNITY
Start: 2024-01-04

## 2024-01-16 NOTE — PROGRESS NOTES
Route Chart for Scheduling    BMT Chart Routing      Follow up with physician 3 months.   Follow up with [place]    Provider visit type Malignant hem   Infusion scheduling note    Injection scheduling note    Labs CBC, CMP and BCR/ABL   Scheduling:  Preferred lab:  Lab interval:  labs one week before visit   Imaging    Pharmacy appointment    Other referrals

## 2024-01-17 NOTE — PROGRESS NOTES
HEMATOLOGIC MALIGNANCIES PROGRESS NOTE    IDENTIFYING STATEMENT   Linh Morales) is a 53 y.o. male with a  of 1970 from [place], [place] with the diagnosis of chronic myeloid leukemia.      ONCOLOGY HISTORY:    Chronic myeloid leukemia   2023: ED visit for left lateral abdominal pain. Abdominal Xray shows splenomegaly.   2023: Sent to the emergency department for abnormal labs. Left flank pain x 2 months.  K, Hgb 11.2 g/dL, Plt 143 K,  Cr 1.4, normal LFTs. Abdominal US with hepatosplenomegaly. Patient given hydrea and allopurinol, unable to transfer to [facility] due to diversion.   2023: Presents to [facility] ED. Improved WBC with hydrea. WBC 297K, Hgb 10.9 g/dL, 140K. Differential with basophilia, myelocytes and metamyelocytes.   2023: Bone marrow biopsy - chronic myeloid leukemia, in chronic phase. BCR/ABL p210 transcript > 55%.   3/8/2023: Started dasatinib   2. HLD  3. Depression  4. Testosterone deficiency   5. Headaches     DATE   BCR/ABL p210  2023  >55% (started dasatinib 3/8/2023)  2023  0.2%  10/3/2023  0.05%  2024  0.1%    INTERVAL HISTORY:      Mr. Schmidt returns to clinic in follow-up of CML. He is feeling well at this time. He denies any adverse effects of dasatinib and reports adherence. He notices diarrhea occasionally but otherwise denies limb swelling, cough, shortness of breath, heart palpitations, nausea/vomiting, cramps, rash.     Past Medical History, Past Social History and Past Family History have been reviewed and are unchanged except as noted in the interval history.    MEDICATIONS:     Current Outpatient Medications on File Prior to Visit   Medication Sig Dispense Refill    allopurinoL (ZYLOPRIM) 300 MG tablet Take 1 tablet (300 mg total) by mouth once daily. 30 tablet 3    atenoloL (TENORMIN) 50 MG tablet TAKE 1 TABLET BY MOUTH 2 TIMES DAILY. 60 tablet 1    dasatinib (SPRYCEL) 100 mg Take 1 tablet (100 mg total) by mouth once daily. 30 tablet 0     FLUoxetine 40 MG capsule Take 40 mg by mouth.      ibuprofen (ADVIL,MOTRIN) 800 MG tablet Take 800 mg by mouth every 8 (eight) hours as needed.      lamoTRIgine (LAMICTAL) 25 MG tablet Take 50 mg by mouth every evening.      multivitamin capsule Take 1 capsule by mouth once daily.      tamsulosin (FLOMAX) 0.4 mg Cap Take 1 capsule (0.4 mg total) by mouth once daily. 30 capsule 0    testosterone cypionate (DEPOTESTOTERONE CYPIONATE) 200 mg/mL injection INJECT 1 ML INTRAMUSCULARLY EVERY WEEK  1    atorvastatin (LIPITOR) 40 MG tablet Take 1 tablet (40 mg total) by mouth once daily. 90 tablet 3    butalbital-acetaminophen-caffeine -40 mg (FIORICET, ESGIC) -40 mg per tablet TAKE 1 TABLET BY MOUTH EVERY 6 HOURS AS NEEDED FOR PAIN OR FOR HEADACHE (Patient not taking: Reported on 1/16/2024) 120 tablet 0    dextroamphetamine-amphetamine (ADDERALL) 20 mg tablet Take 1 tablet by mouth 3 (three) times daily.      LOKELMA 10 gram packet Take 10 g by mouth 3 (three) times daily.       No current facility-administered medications on file prior to visit.       ALLERGIES: Review of patient's allergies indicates:  No Known Allergies     ROS:       Review of Systems   Constitutional:  Negative for diaphoresis, fatigue, fever and unexpected weight change.   HENT:   Negative for lump/mass and sore throat.    Eyes:  Negative for icterus.   Respiratory:  Negative for cough and shortness of breath.    Cardiovascular:  Negative for chest pain and palpitations.   Gastrointestinal:  Positive for diarrhea. Negative for abdominal distention, constipation, nausea and vomiting.   Genitourinary:  Negative for dysuria and frequency.    Musculoskeletal:  Negative for arthralgias, gait problem and myalgias.   Skin:  Negative for rash.   Neurological:  Negative for dizziness, gait problem and headaches.   Hematological:  Negative for adenopathy. Does not bruise/bleed easily.   Psychiatric/Behavioral:  The patient is not nervous/anxious.  "       PHYSICAL EXAM:  Vitals:    01/16/24 1520   BP: 133/66   Pulse: 64   Resp: (!) 98   Temp: 98.4 °F (36.9 °C)   TempSrc: Oral   SpO2: 97%   Weight: 95.8 kg (211 lb 5 oz)   Height: 5' 9" (1.753 m)   PainSc: 0-No pain       KARNOFSKY PERFORMANCE STATUS 90%  ECOG 0    Physical Exam  Constitutional:       General: He is not in acute distress.     Appearance: He is well-developed.   HENT:      Head: Normocephalic and atraumatic.      Mouth/Throat:      Mouth: No oral lesions.   Eyes:      Conjunctiva/sclera: Conjunctivae normal.   Neck:      Thyroid: No thyromegaly.   Cardiovascular:      Rate and Rhythm: Normal rate and regular rhythm.      Heart sounds: Normal heart sounds. No murmur heard.  Pulmonary:      Breath sounds: Normal breath sounds. No wheezing or rales.   Abdominal:      General: There is no distension.      Palpations: Abdomen is soft. There is no hepatomegaly, splenomegaly or mass.      Tenderness: There is no abdominal tenderness.   Lymphadenopathy:      Cervical: No cervical adenopathy.      Right cervical: No deep cervical adenopathy.     Left cervical: No deep cervical adenopathy.   Skin:     Findings: No rash.   Neurological:      Mental Status: He is alert and oriented to person, place, and time.      Cranial Nerves: No cranial nerve deficit.      Coordination: Coordination normal.      Deep Tendon Reflexes: Reflexes are normal and symmetric.         LAB:   Results for orders placed or performed in visit on 01/12/24   BCR/ABL, p210, Quant, Monitor, blood   Result Value Ref Range    Specimen Type, p210, Quant, bld Blood     Final Diagnosis, p210, Quant, bld SEE BELOW     BCR/ABL,p210 Result see interpretation    CBC W/ AUTO DIFFERENTIAL   Result Value Ref Range    WBC 11.56 3.90 - 12.70 K/uL    RBC 5.54 4.60 - 6.20 M/uL    Hemoglobin 17.0 14.0 - 18.0 g/dL    Hematocrit 51.9 40.0 - 54.0 %    MCV 94 82 - 98 fL    MCH 30.7 27.0 - 31.0 pg    MCHC 32.8 32.0 - 36.0 g/dL    RDW 14.3 11.5 - 14.5 %    " Platelets 159 150 - 450 K/uL    MPV 10.6 9.2 - 12.9 fL    Immature Granulocytes 0.7 (H) 0.0 - 0.5 %    Gran # (ANC) 6.3 1.8 - 7.7 K/uL    Immature Grans (Abs) 0.08 (H) 0.00 - 0.04 K/uL    Lymph # 3.3 1.0 - 4.8 K/uL    Mono # 1.5 (H) 0.3 - 1.0 K/uL    Eos # 0.3 0.0 - 0.5 K/uL    Baso # 0.05 0.00 - 0.20 K/uL    nRBC 0 0 /100 WBC    Gran % 54.7 38.0 - 73.0 %    Lymph % 28.6 18.0 - 48.0 %    Mono % 13.2 4.0 - 15.0 %    Eosinophil % 2.4 0.0 - 8.0 %    Basophil % 0.4 0.0 - 1.9 %    Differential Method Automated    COMPREHENSIVE METABOLIC PANEL   Result Value Ref Range    Sodium 141 136 - 145 mmol/L    Potassium 4.6 3.5 - 5.1 mmol/L    Chloride 106 95 - 110 mmol/L    CO2 21 (L) 23 - 29 mmol/L    Glucose 83 70 - 110 mg/dL    BUN 22 (H) 6 - 20 mg/dL    Creatinine 1.4 0.5 - 1.4 mg/dL    Calcium 9.2 8.7 - 10.5 mg/dL    Total Protein 7.1 6.0 - 8.4 g/dL    Albumin 4.0 3.5 - 5.2 g/dL    Total Bilirubin 0.7 0.1 - 1.0 mg/dL    Alkaline Phosphatase 73 55 - 135 U/L    AST 33 10 - 40 U/L    ALT 40 10 - 44 U/L    eGFR >60.0 >60 mL/min/1.73 m^2    Anion Gap 14 8 - 16 mmol/L       PROBLEMS ASSESSED THIS VISIT:    1. Chronic myeloid leukemia        PLAN:       Chronic myeloid leukemia  Mr. Schmidt has maintained major molecular response (MMR) on dasatinib therapy. He is currently at response level MR 3.0.     He is tolerating dasatinib well without significant adverse effect. Continue current therapy.    Follow-up  3 months     José Carrion MD  Hematology and Stem Cell Transplant

## 2024-01-23 DIAGNOSIS — R33.9 URINARY RETENTION: ICD-10-CM

## 2024-01-26 RX ORDER — TAMSULOSIN HYDROCHLORIDE 0.4 MG/1
1 CAPSULE ORAL DAILY
Qty: 30 CAPSULE | Refills: 11 | Status: SHIPPED | OUTPATIENT
Start: 2024-01-26 | End: 2024-02-23 | Stop reason: SDUPTHER

## 2024-02-05 DIAGNOSIS — C92.10 CHRONIC MYELOID LEUKEMIA: ICD-10-CM

## 2024-02-05 RX ORDER — ALLOPURINOL 300 MG/1
300 TABLET ORAL DAILY
Qty: 30 TABLET | Refills: 3 | Status: SHIPPED | OUTPATIENT
Start: 2024-02-05 | End: 2024-04-11 | Stop reason: SDUPTHER

## 2024-02-19 DIAGNOSIS — C92.10 CHRONIC MYELOID LEUKEMIA: ICD-10-CM

## 2024-02-19 RX ORDER — DASATINIB 100 MG/1
100 TABLET ORAL DAILY
Qty: 30 TABLET | Refills: 0 | Status: CANCELLED | OUTPATIENT
Start: 2024-02-19 | End: 2024-03-20

## 2024-02-22 DIAGNOSIS — C92.10 CHRONIC MYELOID LEUKEMIA: ICD-10-CM

## 2024-02-23 DIAGNOSIS — R33.9 URINARY RETENTION: ICD-10-CM

## 2024-02-26 RX ORDER — TAMSULOSIN HYDROCHLORIDE 0.4 MG/1
1 CAPSULE ORAL DAILY
Qty: 30 CAPSULE | Refills: 11 | Status: SHIPPED | OUTPATIENT
Start: 2024-02-26 | End: 2024-03-26 | Stop reason: SDUPTHER

## 2024-03-15 DIAGNOSIS — C92.10 CHRONIC MYELOID LEUKEMIA: ICD-10-CM

## 2024-03-20 DIAGNOSIS — C92.10 CHRONIC MYELOID LEUKEMIA: ICD-10-CM

## 2024-03-26 DIAGNOSIS — R33.9 URINARY RETENTION: ICD-10-CM

## 2024-03-28 RX ORDER — TAMSULOSIN HYDROCHLORIDE 0.4 MG/1
1 CAPSULE ORAL DAILY
Qty: 30 CAPSULE | Refills: 11 | Status: SHIPPED | OUTPATIENT
Start: 2024-03-28 | End: 2024-04-24 | Stop reason: SDUPTHER

## 2024-04-11 DIAGNOSIS — C92.10 CHRONIC MYELOID LEUKEMIA: ICD-10-CM

## 2024-04-12 DIAGNOSIS — C92.10 CHRONIC MYELOID LEUKEMIA: ICD-10-CM

## 2024-04-16 ENCOUNTER — OFFICE VISIT (OUTPATIENT)
Dept: HEMATOLOGY/ONCOLOGY | Facility: CLINIC | Age: 54
End: 2024-04-16
Payer: COMMERCIAL

## 2024-04-16 VITALS
OXYGEN SATURATION: 97 % | BODY MASS INDEX: 28.78 KG/M2 | HEART RATE: 59 BPM | WEIGHT: 194.88 LBS | TEMPERATURE: 97 F | DIASTOLIC BLOOD PRESSURE: 67 MMHG | SYSTOLIC BLOOD PRESSURE: 128 MMHG | RESPIRATION RATE: 18 BRPM

## 2024-04-16 DIAGNOSIS — C92.10 CHRONIC MYELOID LEUKEMIA: Primary | ICD-10-CM

## 2024-04-16 PROCEDURE — 99214 OFFICE O/P EST MOD 30 MIN: CPT | Mod: S$GLB,,, | Performed by: INTERNAL MEDICINE

## 2024-04-16 PROCEDURE — 1159F MED LIST DOCD IN RCRD: CPT | Mod: CPTII,S$GLB,, | Performed by: INTERNAL MEDICINE

## 2024-04-16 PROCEDURE — 3078F DIAST BP <80 MM HG: CPT | Mod: CPTII,S$GLB,, | Performed by: INTERNAL MEDICINE

## 2024-04-16 PROCEDURE — 3074F SYST BP LT 130 MM HG: CPT | Mod: CPTII,S$GLB,, | Performed by: INTERNAL MEDICINE

## 2024-04-16 PROCEDURE — 99999 PR PBB SHADOW E&M-EST. PATIENT-LVL IV: CPT | Mod: PBBFAC,,, | Performed by: INTERNAL MEDICINE

## 2024-04-16 PROCEDURE — 1160F RVW MEDS BY RX/DR IN RCRD: CPT | Mod: CPTII,S$GLB,, | Performed by: INTERNAL MEDICINE

## 2024-04-16 PROCEDURE — 3008F BODY MASS INDEX DOCD: CPT | Mod: CPTII,S$GLB,, | Performed by: INTERNAL MEDICINE

## 2024-04-16 RX ORDER — DASATINIB 100 MG/1
100 TABLET ORAL
Qty: 30 TABLET | Refills: 11 | Status: SHIPPED | OUTPATIENT
Start: 2024-04-16 | End: 2024-04-27 | Stop reason: SDUPTHER

## 2024-04-16 RX ORDER — ALLOPURINOL 300 MG/1
300 TABLET ORAL DAILY
Qty: 30 TABLET | Refills: 3 | Status: SHIPPED | OUTPATIENT
Start: 2024-04-16

## 2024-04-16 NOTE — PROGRESS NOTES
Route Chart for Scheduling    BMT Chart Routing      Follow up with physician 3 months.   Follow up with [place]    Provider visit type Malignant hem   Infusion scheduling note    Injection scheduling note    Labs CBC, CMP and BCR/ABL   Scheduling:  Preferred lab:  Lab interval:  labs one week before appt please   Imaging    Pharmacy appointment    Other referrals

## 2024-04-23 NOTE — PROGRESS NOTES
HEMATOLOGIC MALIGNANCIES PROGRESS NOTE    IDENTIFYING STATEMENT   Linh Morales) is a 53 y.o. male with a  of 1970 from [place], [place] with the diagnosis of chronic myeloid leukemia.      ONCOLOGY HISTORY:    Chronic myeloid leukemia   2023: ED visit for left lateral abdominal pain. Abdominal Xray shows splenomegaly.   2023: Sent to the emergency department for abnormal labs. Left flank pain x 2 months.  K, Hgb 11.2 g/dL, Plt 143 K,  Cr 1.4, normal LFTs. Abdominal US with hepatosplenomegaly. Patient given hydrea and allopurinol, unable to transfer to [facility] due to diversion.   2023: Presents to [facility] ED. Improved WBC with hydrea. WBC 297K, Hgb 10.9 g/dL, 140K. Differential with basophilia, myelocytes and metamyelocytes.   2023: Bone marrow biopsy - chronic myeloid leukemia, in chronic phase. BCR/ABL p210 transcript > 55%.   3/8/2023: Started dasatinib   2. HLD  3. Depression  4. Testosterone deficiency   5. Headaches     DATE   BCR/ABL p210  2023  >55% (started dasatinib 3/8/2023)  2023  0.2%  10/3/2023  0.05%  2024  0.1%    INTERVAL HISTORY:      Mr. Schmidt returns to clinic in follow-up of CML. He is feeling well at this time. He denies any adverse effects of dasatinib and reports adherence. He denies limb swelling, cough, shortness of breath, heart palpitations, nausea/vomiting, cramps, rash.     Past Medical History, Past Social History and Past Family History have been reviewed and are unchanged except as noted in the interval history.    MEDICATIONS:     Current Outpatient Medications on File Prior to Visit   Medication Sig Dispense Refill    atenoloL (TENORMIN) 50 MG tablet TAKE 1 TABLET BY MOUTH 2 TIMES DAILY. 60 tablet 1    dextroamphetamine-amphetamine (ADDERALL) 20 mg tablet Take 1 tablet by mouth 3 (three) times daily.      FLUoxetine 40 MG capsule Take 40 mg by mouth.      ibuprofen (ADVIL,MOTRIN) 800 MG tablet Take 800 mg by mouth every 8 (eight)  hours as needed.      lamoTRIgine (LAMICTAL) 25 MG tablet Take 50 mg by mouth every evening.      multivitamin capsule Take 1 capsule by mouth once daily.      tamsulosin (FLOMAX) 0.4 mg Cap Take 1 capsule (0.4 mg total) by mouth once daily. 30 capsule 11    allopurinoL (ZYLOPRIM) 300 MG tablet Take 1 tablet (300 mg total) by mouth once daily. 30 tablet 3    atorvastatin (LIPITOR) 40 MG tablet Take 1 tablet (40 mg total) by mouth once daily. 90 tablet 3    butalbital-acetaminophen-caffeine -40 mg (FIORICET, ESGIC) -40 mg per tablet TAKE 1 TABLET BY MOUTH EVERY 6 HOURS AS NEEDED FOR PAIN OR FOR HEADACHE (Patient not taking: Reported on 1/16/2024) 120 tablet 0    dasatinib (SPRYCEL) 100 mg TAKE 1 TABLET ONCE DAILY 30 tablet 11    LOKELMA 10 gram packet Take 10 g by mouth 3 (three) times daily. (Patient not taking: Reported on 4/16/2024)      testosterone cypionate (DEPOTESTOTERONE CYPIONATE) 200 mg/mL injection INJECT 1 ML INTRAMUSCULARLY EVERY WEEK  1     No current facility-administered medications on file prior to visit.       ALLERGIES: Review of patient's allergies indicates:  No Known Allergies     ROS:       Review of Systems   Constitutional:  Negative for diaphoresis, fatigue, fever and unexpected weight change.   HENT:   Negative for lump/mass and sore throat.    Eyes:  Negative for icterus.   Respiratory:  Negative for cough and shortness of breath.    Cardiovascular:  Negative for chest pain and palpitations.   Gastrointestinal:  Negative for abdominal distention, constipation, diarrhea, nausea and vomiting.   Genitourinary:  Negative for dysuria and frequency.    Musculoskeletal:  Negative for arthralgias, gait problem and myalgias.   Skin:  Negative for rash.   Neurological:  Negative for dizziness, gait problem and headaches.   Hematological:  Negative for adenopathy. Does not bruise/bleed easily.   Psychiatric/Behavioral:  The patient is not nervous/anxious.        PHYSICAL EXAM:  Vitals:     04/16/24 1435   BP: 128/67   Pulse: (!) 59   Resp: 18   Temp: 97.4 °F (36.3 °C)   TempSrc: Oral   SpO2: 97%   Weight: 88.4 kg (194 lb 14.2 oz)   PainSc: 0-No pain       KARNOFSKY PERFORMANCE STATUS 90%  ECOG 0    Physical Exam  Constitutional:       General: He is not in acute distress.     Appearance: He is well-developed.   HENT:      Head: Normocephalic and atraumatic.      Mouth/Throat:      Mouth: No oral lesions.   Eyes:      Conjunctiva/sclera: Conjunctivae normal.   Neck:      Thyroid: No thyromegaly.   Cardiovascular:      Rate and Rhythm: Normal rate and regular rhythm.      Heart sounds: Normal heart sounds. No murmur heard.  Pulmonary:      Breath sounds: Normal breath sounds. No wheezing or rales.   Abdominal:      General: There is no distension.      Palpations: Abdomen is soft. There is no hepatomegaly, splenomegaly or mass.      Tenderness: There is no abdominal tenderness.   Lymphadenopathy:      Cervical: No cervical adenopathy.      Right cervical: No deep cervical adenopathy.     Left cervical: No deep cervical adenopathy.   Skin:     Findings: No rash.   Neurological:      Mental Status: He is alert and oriented to person, place, and time.      Cranial Nerves: No cranial nerve deficit.      Coordination: Coordination normal.      Deep Tendon Reflexes: Reflexes are normal and symmetric.         LAB:   Results for orders placed or performed in visit on 04/10/24   CBC Auto Differential   Result Value Ref Range    WBC 9.19 3.90 - 12.70 K/uL    RBC 5.60 4.60 - 6.20 M/uL    Hemoglobin 17.6 14.0 - 18.0 g/dL    Hematocrit 53.8 40.0 - 54.0 %    MCV 96 82 - 98 fL    MCH 31.4 (H) 27.0 - 31.0 pg    MCHC 32.7 32.0 - 36.0 g/dL    RDW 14.5 11.5 - 14.5 %    Platelets 181 150 - 450 K/uL    MPV 10.7 9.2 - 12.9 fL    Immature Granulocytes 0.8 (H) 0.0 - 0.5 %    Gran # (ANC) 5.4 1.8 - 7.7 K/uL    Immature Grans (Abs) 0.07 (H) 0.00 - 0.04 K/uL    Lymph # 2.5 1.0 - 4.8 K/uL    Mono # 0.9 0.3 - 1.0 K/uL    Eos #  0.3 0.0 - 0.5 K/uL    Baso # 0.06 0.00 - 0.20 K/uL    nRBC 0 0 /100 WBC    Gran % 58.3 38.0 - 73.0 %    Lymph % 27.6 18.0 - 48.0 %    Mono % 9.9 4.0 - 15.0 %    Eosinophil % 2.7 0.0 - 8.0 %    Basophil % 0.7 0.0 - 1.9 %    Differential Method Automated    Comprehensive Metabolic Panel   Result Value Ref Range    Sodium 141 136 - 145 mmol/L    Potassium 4.1 3.5 - 5.1 mmol/L    Chloride 107 95 - 110 mmol/L    CO2 23 23 - 29 mmol/L    Glucose 101 70 - 110 mg/dL    BUN 19 6 - 20 mg/dL    Creatinine 1.4 0.5 - 1.4 mg/dL    Calcium 9.5 8.7 - 10.5 mg/dL    Total Protein 6.9 6.0 - 8.4 g/dL    Albumin 4.0 3.5 - 5.2 g/dL    Total Bilirubin 0.4 0.1 - 1.0 mg/dL    Alkaline Phosphatase 54 (L) 55 - 135 U/L    AST 39 10 - 40 U/L    ALT 51 (H) 10 - 44 U/L    eGFR >60.0 >60 mL/min/1.73 m^2    Anion Gap 11 8 - 16 mmol/L       PROBLEMS ASSESSED THIS VISIT:    1. Chronic myeloid leukemia      PLAN:       Chronic myeloid leukemia  Mr. Schmidt has maintained major molecular response (MMR) on dasatinib therapy. He is currently at response level MR 3.0. BCR/ABL was not drawn on current labs. We will check again prior to next visit.     He is tolerating dasatinib well without significant adverse effect. Continue current therapy.    Follow-up  3 months     José Carrion MD  Hematology and Stem Cell Transplant

## 2024-04-24 DIAGNOSIS — R33.9 URINARY RETENTION: ICD-10-CM

## 2024-04-26 RX ORDER — TAMSULOSIN HYDROCHLORIDE 0.4 MG/1
1 CAPSULE ORAL DAILY
Qty: 30 CAPSULE | Refills: 11 | Status: SHIPPED | OUTPATIENT
Start: 2024-04-26

## 2024-04-27 DIAGNOSIS — C92.10 CHRONIC MYELOID LEUKEMIA: ICD-10-CM

## 2024-07-16 ENCOUNTER — OFFICE VISIT (OUTPATIENT)
Dept: HEMATOLOGY/ONCOLOGY | Facility: CLINIC | Age: 54
End: 2024-07-16
Payer: COMMERCIAL

## 2024-07-16 VITALS
RESPIRATION RATE: 18 BRPM | BODY MASS INDEX: 31.34 KG/M2 | WEIGHT: 211.63 LBS | OXYGEN SATURATION: 98 % | HEIGHT: 69 IN | TEMPERATURE: 98 F | DIASTOLIC BLOOD PRESSURE: 65 MMHG | SYSTOLIC BLOOD PRESSURE: 133 MMHG | HEART RATE: 53 BPM

## 2024-07-16 DIAGNOSIS — C92.10 CHRONIC MYELOID LEUKEMIA: Primary | ICD-10-CM

## 2024-07-16 PROCEDURE — 1159F MED LIST DOCD IN RCRD: CPT | Mod: CPTII,S$GLB,, | Performed by: PHYSICIAN ASSISTANT

## 2024-07-16 PROCEDURE — 3075F SYST BP GE 130 - 139MM HG: CPT | Mod: CPTII,S$GLB,, | Performed by: PHYSICIAN ASSISTANT

## 2024-07-16 PROCEDURE — 3008F BODY MASS INDEX DOCD: CPT | Mod: CPTII,S$GLB,, | Performed by: PHYSICIAN ASSISTANT

## 2024-07-16 PROCEDURE — 3078F DIAST BP <80 MM HG: CPT | Mod: CPTII,S$GLB,, | Performed by: PHYSICIAN ASSISTANT

## 2024-07-16 PROCEDURE — 99999 PR PBB SHADOW E&M-EST. PATIENT-LVL IV: CPT | Mod: PBBFAC,,, | Performed by: PHYSICIAN ASSISTANT

## 2024-07-16 PROCEDURE — 99214 OFFICE O/P EST MOD 30 MIN: CPT | Mod: S$GLB,,, | Performed by: PHYSICIAN ASSISTANT

## 2024-07-16 PROCEDURE — 1160F RVW MEDS BY RX/DR IN RCRD: CPT | Mod: CPTII,S$GLB,, | Performed by: PHYSICIAN ASSISTANT

## 2024-07-16 NOTE — PROGRESS NOTES
HEMATOLOGIC MALIGNANCIES PROGRESS NOTE    IDENTIFYING STATEMENT   Linh Morales) is a 54 y.o. male with a  of 1970 from [place], [place] with the diagnosis of chronic myeloid leukemia.      ONCOLOGY HISTORY:    Chronic myeloid leukemia   2023: ED visit for left lateral abdominal pain. Abdominal Xray shows splenomegaly.   2023: Sent to the emergency department for abnormal labs. Left flank pain x 2 months.  K, Hgb 11.2 g/dL, Plt 143 K,  Cr 1.4, normal LFTs. Abdominal US with hepatosplenomegaly. Patient given hydrea and allopurinol, unable [place] [facility] [place].   2023: Presents to [facility] [place]. Improved WBC with hydrea. WBC 297K, Hgb 10.9 g/dL, 140K. Differential with basophilia, myelocytes and metamyelocytes.   2023: Bone marrow biopsy - chronic myeloid leukemia, in chronic phase. BCR/ABL p210 transcript > 55%.   3/8/2023: Started dasatinib   2. HLD  3. Depression  4. Testosterone deficiency   5. Headaches     DATE   BCR/ABL p210  2023  >55% (started dasatinib 3/8/2023)  2023  0.2%  10/3/2023  0.05%  2024  0.1%  2024  0.05%    INTERVAL HISTORY:      Mr. Schmidt returns to clinic in follow-up of CML, accompanied by his wife and two sons. He is feeling well at this time. He denies any adverse effects of dasatinib and reports adherence. He denies limb swelling, cough, shortness of breath, heart palpitations, nausea/vomiting, cramps, rash.     Past Medical History, Past Social History and Past Family History have been reviewed and are unchanged except as noted in the interval history.    MEDICATIONS:     Current Outpatient Medications on File Prior to Visit   Medication Sig Dispense Refill    allopurinoL (ZYLOPRIM) 300 MG tablet Take 1 tablet (300 mg total) by mouth once daily. 30 tablet 3    atenoloL (TENORMIN) 50 MG tablet TAKE 1 TABLET BY MOUTH 2 TIMES DAILY. 60 tablet 1    dasatinib (SPRYCEL) 100 mg Take 1 tablet (100 mg total) by mouth once daily.  30 tablet 11    dextroamphetamine-amphetamine (ADDERALL) 20 mg tablet Take 1 tablet by mouth 3 (three) times daily.      FLUoxetine 40 MG capsule Take 40 mg by mouth.      ibuprofen (ADVIL,MOTRIN) 800 MG tablet Take 800 mg by mouth every 8 (eight) hours as needed.      lamoTRIgine (LAMICTAL) 25 MG tablet Take 50 mg by mouth every evening.      multivitamin capsule Take 1 capsule by mouth once daily.      tamsulosin (FLOMAX) 0.4 mg Cap Take 1 capsule (0.4 mg total) by mouth once daily. 30 capsule 11    testosterone cypionate (DEPOTESTOTERONE CYPIONATE) 200 mg/mL injection INJECT 1 ML INTRAMUSCULARLY EVERY WEEK  1    atorvastatin (LIPITOR) 40 MG tablet Take 1 tablet (40 mg total) by mouth once daily. (Patient not taking: Reported on 7/16/2024) 90 tablet 3    butalbital-acetaminophen-caffeine -40 mg (FIORICET, ESGIC) -40 mg per tablet TAKE 1 TABLET BY MOUTH EVERY 6 HOURS AS NEEDED FOR PAIN OR FOR HEADACHE (Patient not taking: Reported on 1/16/2024) 120 tablet 0    LOKELMA 10 gram packet Take 10 g by mouth 3 (three) times daily. (Patient not taking: Reported on 4/16/2024)       No current facility-administered medications on file prior to visit.       ALLERGIES: Review of patient's allergies indicates:  No Known Allergies     ROS:       Review of Systems   Constitutional:  Negative for diaphoresis, fatigue, fever and unexpected weight change.   HENT:   Negative for lump/mass and sore throat.    Eyes:  Negative for icterus.   Respiratory:  Negative for cough and shortness of breath.    Cardiovascular:  Negative for chest pain and palpitations.   Gastrointestinal:  Negative for abdominal distention, constipation, diarrhea, nausea and vomiting.   Genitourinary:  Negative for dysuria and frequency.    Musculoskeletal:  Negative for arthralgias, gait problem and myalgias.   Skin:  Negative for rash.   Neurological:  Negative for dizziness, gait problem and headaches.   Hematological:  Negative for adenopathy.  "Does not bruise/bleed easily.   Psychiatric/Behavioral:  The patient is not nervous/anxious.        PHYSICAL EXAM:  Vitals:    07/16/24 1426   BP: 133/65   Pulse: (!) 53   Resp: 18   Temp: 98.4 °F (36.9 °C)   TempSrc: Oral   SpO2: 98%   Weight: 96 kg (211 lb 10.3 oz)   Height: 5' 9" (1.753 m)   PainSc: 0-No pain       KARNOFSKY PERFORMANCE STATUS 90%  ECOG 0    Physical Exam  Constitutional:       General: He is not in acute distress.     Appearance: He is well-developed.   HENT:      Head: Normocephalic and atraumatic.      Mouth/Throat:      Mouth: No oral lesions.   Eyes:      Conjunctiva/sclera: Conjunctivae normal.   Neck:      Thyroid: No thyromegaly.   Cardiovascular:      Rate and Rhythm: Normal rate and regular rhythm.      Heart sounds: Normal heart sounds. No murmur heard.  Pulmonary:      Breath sounds: Normal breath sounds. No wheezing or rales.   Abdominal:      General: There is no distension.      Palpations: Abdomen is soft. There is no hepatomegaly, splenomegaly or mass.      Tenderness: There is no abdominal tenderness.   Lymphadenopathy:      Cervical: No cervical adenopathy.      Right cervical: No deep cervical adenopathy.     Left cervical: No deep cervical adenopathy.   Skin:     Findings: No rash.   Neurological:      Mental Status: He is alert and oriented to person, place, and time.      Cranial Nerves: No cranial nerve deficit.      Coordination: Coordination normal.      Deep Tendon Reflexes: Reflexes are normal and symmetric.         LAB:   Results for orders placed or performed in visit on 07/09/24   BCR/ABL, p210, Quant, Monitor, blood   Result Value Ref Range    Specimen Type, p210, Quant, bld Blood     Final Diagnosis, p210, Quant, bld SEE BELOW     BCR/ABL,p210 Result see interpretation    CBC Auto Differential   Result Value Ref Range    WBC 8.77 3.90 - 12.70 K/uL    RBC 5.34 4.60 - 6.20 M/uL    Hemoglobin 16.6 14.0 - 18.0 g/dL    Hematocrit 51.7 40.0 - 54.0 %    MCV 97 82 - 98 fL "    MCH 31.1 (H) 27.0 - 31.0 pg    MCHC 32.1 32.0 - 36.0 g/dL    RDW 13.5 11.5 - 14.5 %    Platelets 167 150 - 450 K/uL    MPV 10.9 9.2 - 12.9 fL    Immature Granulocytes 0.8 (H) 0.0 - 0.5 %    Gran # (ANC) 5.5 1.8 - 7.7 K/uL    Immature Grans (Abs) 0.07 (H) 0.00 - 0.04 K/uL    Lymph # 2.1 1.0 - 4.8 K/uL    Mono # 0.8 0.3 - 1.0 K/uL    Eos # 0.2 0.0 - 0.5 K/uL    Baso # 0.04 0.00 - 0.20 K/uL    nRBC 0 0 /100 WBC    Gran % 62.8 38.0 - 73.0 %    Lymph % 24.3 18.0 - 48.0 %    Mono % 8.9 4.0 - 15.0 %    Eosinophil % 2.7 0.0 - 8.0 %    Basophil % 0.5 0.0 - 1.9 %    Differential Method Automated    Comprehensive Metabolic Panel   Result Value Ref Range    Sodium 143 136 - 145 mmol/L    Potassium 5.1 3.5 - 5.1 mmol/L    Chloride 107 95 - 110 mmol/L    CO2 25 23 - 29 mmol/L    Glucose 109 70 - 110 mg/dL    BUN 19 6 - 20 mg/dL    Creatinine 1.5 (H) 0.5 - 1.4 mg/dL    Calcium 9.5 8.7 - 10.5 mg/dL    Total Protein 7.0 6.0 - 8.4 g/dL    Albumin 4.0 3.5 - 5.2 g/dL    Total Bilirubin 0.4 0.1 - 1.0 mg/dL    Alkaline Phosphatase 76 55 - 135 U/L    AST 32 10 - 40 U/L    ALT 42 10 - 44 U/L    eGFR 55.3 (A) >60 mL/min/1.73 m^2    Anion Gap 11 8 - 16 mmol/L       PROBLEMS ASSESSED THIS VISIT:    1. Chronic myeloid leukemia      PLAN:       Chronic myeloid leukemia  Mr. Schmidt has maintained major molecular response (MMR) on dasatinib therapy. He is currently at response level MR 3.0. Most recent BCR-ABL 0.05% consisted with ongoing MMR.     He is tolerating dasatinib well without significant adverse effect. Continue current therapy.    SARY  Slightly increased creatinine on labs (baseline 1.4, up to 1.5). Encouraged improved hydration.     Follow-up  3 months     Grisel Thornton PA-C  Hematology and Stem Cell Transplant

## 2024-07-23 DIAGNOSIS — C92.10 CHRONIC MYELOID LEUKEMIA: ICD-10-CM

## 2024-07-24 RX ORDER — ALLOPURINOL 300 MG/1
300 TABLET ORAL DAILY
Qty: 30 TABLET | Refills: 3 | Status: SHIPPED | OUTPATIENT
Start: 2024-07-24

## 2024-10-15 ENCOUNTER — OFFICE VISIT (OUTPATIENT)
Dept: HEMATOLOGY/ONCOLOGY | Facility: CLINIC | Age: 54
End: 2024-10-15
Payer: COMMERCIAL

## 2024-10-15 VITALS
BODY MASS INDEX: 29.63 KG/M2 | OXYGEN SATURATION: 96 % | RESPIRATION RATE: 18 BRPM | WEIGHT: 200.06 LBS | HEIGHT: 69 IN | DIASTOLIC BLOOD PRESSURE: 71 MMHG | SYSTOLIC BLOOD PRESSURE: 130 MMHG | HEART RATE: 60 BPM

## 2024-10-15 DIAGNOSIS — C92.10 CHRONIC MYELOID LEUKEMIA: Primary | ICD-10-CM

## 2024-10-15 DIAGNOSIS — K13.0 LESION OF LIP: ICD-10-CM

## 2024-10-15 PROCEDURE — 99999 PR PBB SHADOW E&M-EST. PATIENT-LVL IV: CPT | Mod: PBBFAC,,, | Performed by: PHYSICIAN ASSISTANT

## 2024-10-15 NOTE — PROGRESS NOTES
HEMATOLOGIC MALIGNANCIES PROGRESS NOTE    IDENTIFYING STATEMENT   Linh Morales) is a 54 y.o. male with a  of 1970 from [place], [place] with the diagnosis of chronic myeloid leukemia.      ONCOLOGY HISTORY:    Chronic myeloid leukemia   2023: ED visit for left lateral abdominal pain. Abdominal Xray shows splenomegaly.   2023: Sent to the emergency department for abnormal labs. Left flank pain x 2 months.  K, Hgb 11.2 g/dL, Plt 143 K,  Cr 1.4, normal LFTs. Abdominal US with hepatosplenomegaly. Patient given hydrea and allopurinol, unable to transfer to [facility] due to diversion.   2023: Presents to [facility] ED. Improved WBC with hydrea. WBC 297K, Hgb 10.9 g/dL, 140K. Differential with basophilia, myelocytes and metamyelocytes.   2023: Bone marrow biopsy - chronic myeloid leukemia, in chronic phase. BCR/ABL p210 transcript > 55%.   3/8/2023: Started dasatinib   2. HLD  3. Depression  4. Testosterone deficiency   5. Headaches     DATE   BCR/ABL p210  2023  >55% (started dasatinib 3/8/2023)  2023  0.2%  10/3/2023  0.05%  2024  0.1%  2024  0.05%    INTERVAL HISTORY:      Mr. Schmidt returns to clinic in follow-up of CML, accompanied by his wife and two sons. He is feeling well at this time. He denies any adverse effects of dasatinib and reports adherence. He denies limb swelling, cough, shortness of breath, heart palpitations, nausea/vomiting, cramps, rash.     BCR/ABL lab was not drawn, repeat labs tomorrow.     Past Medical History, Past Social History and Past Family History have been reviewed and are unchanged except as noted in the interval history.    MEDICATIONS:     Current Outpatient Medications on File Prior to Visit   Medication Sig Dispense Refill    allopurinoL (ZYLOPRIM) 300 MG tablet TAKE 1 TABLET (300 MG TOTAL) BY MOUTH ONCE DAILY. 30 tablet 3    atenoloL (TENORMIN) 50 MG tablet TAKE 1 TABLET BY MOUTH 2 TIMES DAILY. 60 tablet 1    dasatinib (SPRYCEL) 100  mg Take 1 tablet (100 mg total) by mouth once daily. 30 tablet 11    dextroamphetamine-amphetamine (ADDERALL) 20 mg tablet Take 1 tablet by mouth 3 (three) times daily.      FLUoxetine 40 MG capsule Take 40 mg by mouth.      ibuprofen (ADVIL,MOTRIN) 800 MG tablet Take 800 mg by mouth every 8 (eight) hours as needed.      lamoTRIgine (LAMICTAL) 25 MG tablet Take 50 mg by mouth every evening.      multivitamin capsule Take 1 capsule by mouth once daily.      tamsulosin (FLOMAX) 0.4 mg Cap Take 1 capsule (0.4 mg total) by mouth once daily. 30 capsule 11    testosterone cypionate (DEPOTESTOTERONE CYPIONATE) 200 mg/mL injection INJECT 1 ML INTRAMUSCULARLY EVERY WEEK  1    atorvastatin (LIPITOR) 40 MG tablet Take 1 tablet (40 mg total) by mouth once daily. (Patient not taking: Reported on 7/16/2024) 90 tablet 3    butalbital-acetaminophen-caffeine -40 mg (FIORICET, ESGIC) -40 mg per tablet TAKE 1 TABLET BY MOUTH EVERY 6 HOURS AS NEEDED FOR PAIN OR FOR HEADACHE (Patient not taking: Reported on 10/15/2024) 120 tablet 0    LOKELMA 10 gram packet Take 10 g by mouth 3 (three) times daily. (Patient not taking: Reported on 10/15/2024)       No current facility-administered medications on file prior to visit.       ALLERGIES: Review of patient's allergies indicates:  No Known Allergies     ROS:       Review of Systems   Constitutional:  Negative for diaphoresis, fatigue, fever and unexpected weight change.   HENT:   Negative for lump/mass and sore throat.    Eyes:  Negative for icterus.   Respiratory:  Negative for cough and shortness of breath.    Cardiovascular:  Negative for chest pain and palpitations.   Gastrointestinal:  Negative for abdominal distention, constipation, diarrhea, nausea and vomiting.   Genitourinary:  Negative for dysuria and frequency.    Musculoskeletal:  Negative for arthralgias, gait problem and myalgias.   Skin:  Negative for rash.   Neurological:  Negative for dizziness, gait problem and  "headaches.   Hematological:  Negative for adenopathy. Does not bruise/bleed easily.   Psychiatric/Behavioral:  The patient is not nervous/anxious.        PHYSICAL EXAM:  Vitals:    10/15/24 1429   BP: 130/71   Pulse: 60   Resp: 18   SpO2: 96%   Weight: 90.8 kg (200 lb 1.1 oz)   Height: 5' 9" (1.753 m)   PainSc: 0-No pain       KARNOFSKY PERFORMANCE STATUS 100%  ECOG 0    Physical Exam  Constitutional:       General: He is not in acute distress.     Appearance: He is well-developed.   HENT:      Head: Normocephalic and atraumatic.      Mouth/Throat:      Mouth: No oral lesions.      Comments: Small, circumscribed purple lesion to left lower lip (appears c/w hemangioma)  Eyes:      Conjunctiva/sclera: Conjunctivae normal.   Neck:      Thyroid: No thyromegaly.   Cardiovascular:      Rate and Rhythm: Normal rate and regular rhythm.      Heart sounds: Normal heart sounds. No murmur heard.  Pulmonary:      Breath sounds: Normal breath sounds. No wheezing or rales.   Abdominal:      General: There is no distension.      Palpations: Abdomen is soft. There is no hepatomegaly, splenomegaly or mass.      Tenderness: There is no abdominal tenderness.   Lymphadenopathy:      Cervical: No cervical adenopathy.      Right cervical: No deep cervical adenopathy.     Left cervical: No deep cervical adenopathy.   Skin:     Findings: No rash.   Neurological:      Mental Status: He is alert and oriented to person, place, and time.      Cranial Nerves: No cranial nerve deficit.      Coordination: Coordination normal.      Deep Tendon Reflexes: Reflexes are normal and symmetric.         LAB:   Results for orders placed or performed in visit on 10/14/24   CBC Auto Differential    Collection Time: 10/14/24  2:14 PM   Result Value Ref Range    WBC 10.97 3.90 - 12.70 K/uL    RBC 5.42 4.60 - 6.20 M/uL    Hemoglobin 16.8 14.0 - 18.0 g/dL    Hematocrit 50.8 40.0 - 54.0 %    MCV 94 82 - 98 fL    MCH 31.0 27.0 - 31.0 pg    MCHC 33.1 32.0 - 36.0 " g/dL    RDW 14.4 11.5 - 14.5 %    Platelets 175 150 - 450 K/uL    MPV 9.9 9.2 - 12.9 fL    Immature Granulocytes 0.8 (H) 0.0 - 0.5 %    Gran # (ANC) 5.7 1.8 - 7.7 K/uL    Immature Grans (Abs) 0.09 (H) 0.00 - 0.04 K/uL    Lymph # 3.5 1.0 - 4.8 K/uL    Mono # 1.3 (H) 0.3 - 1.0 K/uL    Eos # 0.3 0.0 - 0.5 K/uL    Baso # 0.05 0.00 - 0.20 K/uL    nRBC 0 0 /100 WBC    Gran % 52.2 38.0 - 73.0 %    Lymph % 32.0 18.0 - 48.0 %    Mono % 11.9 4.0 - 15.0 %    Eosinophil % 2.6 0.0 - 8.0 %    Basophil % 0.5 0.0 - 1.9 %    Differential Method Automated    Comprehensive Metabolic Panel    Collection Time: 10/14/24  2:14 PM   Result Value Ref Range    Sodium 139 136 - 145 mmol/L    Potassium 4.4 3.5 - 5.1 mmol/L    Chloride 107 95 - 110 mmol/L    CO2 23 23 - 29 mmol/L    Glucose 100 70 - 110 mg/dL    BUN 14 6 - 20 mg/dL    Creatinine 1.4 0.5 - 1.4 mg/dL    Calcium 9.2 8.7 - 10.5 mg/dL    Total Protein 7.0 6.0 - 8.4 g/dL    Albumin 4.0 3.5 - 5.2 g/dL    Total Bilirubin 0.7 0.1 - 1.0 mg/dL    Alkaline Phosphatase 80 55 - 135 U/L    AST 25 10 - 40 U/L    ALT 39 10 - 44 U/L    eGFR >60.0 >60 mL/min/1.73 m^2    Anion Gap 9 8 - 16 mmol/L   Lactate Dehydrogenase    Collection Time: 10/14/24  2:14 PM   Result Value Ref Range     110 - 260 U/L   Lipid panel    Collection Time: 10/14/24  2:14 PM   Result Value Ref Range    Cholesterol 178 120 - 199 mg/dL    Triglycerides 107 30 - 150 mg/dL    HDL 49 40 - 75 mg/dL    LDL Cholesterol 107.6 63.0 - 159.0 mg/dL    HDL/Cholesterol Ratio 27.5 20.0 - 50.0 %    Total Cholesterol/HDL Ratio 3.6 2.0 - 5.0    Non-HDL Cholesterol 129 mg/dL       PROBLEMS ASSESSED THIS VISIT:    1. Lesion of lip      PLAN:       Chronic myeloid leukemia  Mr. Schmidt has maintained major molecular response (MMR) on dasatinib therapy. He is currently at response level MR 3.0. Most recent BCR-ABL 0.05% from July consistent with ongoing MMR, will have repeat BCR/ABL testing tomorrow (lab wasn't drawn).     He is  tolerating dasatinib well without significant adverse effect. Continue current therapy.    Lesion of Lip  Small, circular, purple lesion to L lower lip. Suspect hemangioma. Has never had dermatology evaluation, does have family history of skin cancer - will refer to dermatology for skin eval to be cautious.     Follow-up  3 months       BMT Chart Routing      Follow up with physician 3 months. f/u with mildred in 3 months, labs 1 wk before   Follow up with [place]    Provider visit type    Infusion scheduling note    Injection scheduling note    Labs CBC, CMP, BCR/ABL and LDH   Scheduling:  Preferred lab:  Lab interval:  labs at [facility] 1 wk before   Imaging    Pharmacy appointment    Other referrals                    Grisel Thornton PA-C  Hematology and Stem Cell Transplant

## 2024-11-13 DIAGNOSIS — C92.10 CHRONIC MYELOID LEUKEMIA: ICD-10-CM

## 2024-11-21 RX ORDER — ALLOPURINOL 300 MG/1
300 TABLET ORAL DAILY
Qty: 30 TABLET | Refills: 3 | Status: SHIPPED | OUTPATIENT
Start: 2024-11-21

## 2024-12-02 DIAGNOSIS — C92.10 CHRONIC MYELOID LEUKEMIA: Primary | ICD-10-CM

## 2024-12-06 ENCOUNTER — OFFICE VISIT (OUTPATIENT)
Dept: PRIMARY CARE CLINIC | Facility: CLINIC | Age: 54
End: 2024-12-06
Payer: COMMERCIAL

## 2024-12-06 VITALS
HEIGHT: 69 IN | RESPIRATION RATE: 18 BRPM | BODY MASS INDEX: 29.83 KG/M2 | OXYGEN SATURATION: 99 % | WEIGHT: 201.38 LBS | SYSTOLIC BLOOD PRESSURE: 138 MMHG | HEART RATE: 77 BPM | DIASTOLIC BLOOD PRESSURE: 86 MMHG

## 2024-12-06 DIAGNOSIS — S46.211A STRAIN OF RIGHT BICEPS, INITIAL ENCOUNTER: ICD-10-CM

## 2024-12-06 DIAGNOSIS — R79.89 LOW TESTOSTERONE IN MALE: ICD-10-CM

## 2024-12-06 DIAGNOSIS — F90.2 ATTENTION DEFICIT HYPERACTIVITY DISORDER (ADHD), COMBINED TYPE: ICD-10-CM

## 2024-12-06 DIAGNOSIS — F41.9 ANXIETY: ICD-10-CM

## 2024-12-06 DIAGNOSIS — I10 ESSENTIAL HYPERTENSION: ICD-10-CM

## 2024-12-06 DIAGNOSIS — C92.10 CML (CHRONIC MYELOCYTIC LEUKEMIA): Primary | ICD-10-CM

## 2024-12-06 DIAGNOSIS — Z79.899 OTHER LONG TERM (CURRENT) DRUG THERAPY: ICD-10-CM

## 2024-12-06 DIAGNOSIS — R00.0 TACHYCARDIA: ICD-10-CM

## 2024-12-06 PROCEDURE — 99999 PR PBB SHADOW E&M-EST. PATIENT-LVL V: CPT | Mod: PBBFAC,,, | Performed by: STUDENT IN AN ORGANIZED HEALTH CARE EDUCATION/TRAINING PROGRAM

## 2024-12-06 RX ORDER — LAMOTRIGINE 25 MG/1
50 TABLET ORAL NIGHTLY
Status: CANCELLED | OUTPATIENT
Start: 2024-12-06

## 2024-12-06 RX ORDER — ATENOLOL 50 MG/1
50 TABLET ORAL 2 TIMES DAILY
Qty: 60 TABLET | Refills: 1 | Status: CANCELLED | OUTPATIENT
Start: 2024-12-06

## 2024-12-06 RX ORDER — LAMOTRIGINE 25 MG/1
50 TABLET ORAL NIGHTLY
Qty: 30 TABLET | Refills: 11 | Status: SHIPPED | OUTPATIENT
Start: 2024-12-06

## 2024-12-06 RX ORDER — DEXTROAMPHETAMINE SACCHARATE, AMPHETAMINE ASPARTATE, DEXTROAMPHETAMINE SULFATE AND AMPHETAMINE SULFATE 5; 5; 5; 5 MG/1; MG/1; MG/1; MG/1
1 TABLET ORAL 2 TIMES DAILY
Qty: 60 TABLET | Refills: 0 | Status: SHIPPED | OUTPATIENT
Start: 2025-02-04

## 2024-12-06 RX ORDER — DEXTROAMPHETAMINE SACCHARATE, AMPHETAMINE ASPARTATE, DEXTROAMPHETAMINE SULFATE AND AMPHETAMINE SULFATE 5; 5; 5; 5 MG/1; MG/1; MG/1; MG/1
1 TABLET ORAL 3 TIMES DAILY
Status: CANCELLED | OUTPATIENT
Start: 2024-12-06

## 2024-12-06 RX ORDER — FLUOXETINE HYDROCHLORIDE 40 MG/1
40 CAPSULE ORAL DAILY
Qty: 90 CAPSULE | Refills: 3 | Status: SHIPPED | OUTPATIENT
Start: 2024-12-06

## 2024-12-06 RX ORDER — DEXTROAMPHETAMINE SACCHARATE, AMPHETAMINE ASPARTATE, DEXTROAMPHETAMINE SULFATE AND AMPHETAMINE SULFATE 5; 5; 5; 5 MG/1; MG/1; MG/1; MG/1
1 TABLET ORAL 2 TIMES DAILY
Qty: 60 TABLET | Refills: 0 | Status: SHIPPED | OUTPATIENT
Start: 2024-12-06

## 2024-12-06 RX ORDER — ATENOLOL 50 MG/1
50 TABLET ORAL 2 TIMES DAILY
Qty: 60 TABLET | Refills: 11 | Status: SHIPPED | OUTPATIENT
Start: 2024-12-06

## 2024-12-06 RX ORDER — DEXTROAMPHETAMINE SACCHARATE, AMPHETAMINE ASPARTATE, DEXTROAMPHETAMINE SULFATE AND AMPHETAMINE SULFATE 5; 5; 5; 5 MG/1; MG/1; MG/1; MG/1
1 TABLET ORAL 2 TIMES DAILY
Qty: 60 TABLET | Refills: 0 | Status: SHIPPED | OUTPATIENT
Start: 2025-01-05

## 2024-12-06 NOTE — PATIENT INSTRUCTIONS
Assessment & Plan    IMPRESSION:  - Assessed current medication regimen, including elevated-dose Adderall (60mg daily) and testosterone replacement therapy  - Concerned about potential cardiac strain from combination of elevated-dose stimulant and testosterone  - Recommend reducing Adderall dosage from 60mg to 40mg daily due to cardiac concerns and standard dosing guidelines  - Deferred starting antihypertensive medication due to borderline blood pressure readings (elevated 130s/mid 80s)  - Evaluated recent lab results, noting normal blood counts, liver and kidney function  - Reviewed EKG from August, found to be normal  - Continued mood stabilizer (Lamotrigine) and antidepressant (Fluoxetine) without changes  - Suspected possible bicep tear based on physical exam and patient-reported symptoms    ATTENTION-DEFICIT HYPERACTIVITY DISORDER (ADHD):  - Explained that Adderall does not build up in the system like some other medications (e.g., Fluoxetine) and can be skipped occasionally without significant issues.  - Discussed potential side effects of stimulant medications, including decreased appetite, sleep disturbances, and palpitations.  - Educated on the importance of not taking Adderall within 6-8 hours of bedtime to avoid sleep disruption.  - Informed about the body's ability to develop tolerance to stimulant medications over time.  - Linh to avoid taking Adderall within 6-8 hours of bedtime.  - Decreased Adderall from 20mg 3 times daily to 20mg twice daily.  - Refilled Adderall 20mg, #60 pills, with instructions for twice daily dosing.  - Provided 3 separate 30-day prescriptions dated December 6th, January 5th, and February 4th.    MAJOR DEPRESSIVE DISORDER:  - Continued Fluoxetine 40mg daily.    UNSPECIFIED ANXIETY DISORDER:  - Continued Lamotrigine at current dose.    HYPERURICEMIA:  - Continued allopurinol 300mg daily.    THYROID FUNCTION:  - Explained the normal range for thyroid levels (0.4 to 4).  -  Ordered thyroid function test for approximately 6 months from now.    DIABETES SCREENING:  - Explained the recommendation for diabetes screening (A1C) every 3 years for non-diabetic individuals.  - Ordered A1C (diabetes screening) for approximately 6 months from now.    TESTICULAR HYPOFUNCTION:  - Referred to urology for evaluation and management of testosterone replacement therapy.    ESSENTIAL HYPERTENSION:  - Linh to continue monitoring blood pressure.    STRAIN OF MUSCLES AT SHOULDER AND UPPER ARM:  - Considered referral to sports medicine for evaluation of potential bicep tear.    FOLLOW-UP:  - Follow up virtually in 90 days to assess Adderall efficacy and dosing.  - Follow up in office in 1 year for in-person evaluation, including cardiac assessment and potential EKG.  - Contact the office if experiencing any issues with medication changes or new symptoms.

## 2024-12-06 NOTE — PROGRESS NOTES
Assessment:       1. CML (chronic myelocytic leukemia)    2. Essential hypertension    3. Tachycardia    4. Attention deficit hyperactivity disorder (ADHD), combined type    5. Anxiety    6. Low testosterone in male    7. Other long term (current) drug therapy    8. Strain of right biceps, initial encounter           Plan:     Assessment & Plan    IMPRESSION:  - Assessed current medication regimen, including elevated-dose Adderall (60mg daily) and testosterone replacement therapy  - Concerned about potential cardiac strain from combination of elevated-dose stimulant and testosterone  - Recommend reducing Adderall dosage from 60mg to 40mg daily due to cardiac concerns and standard dosing guidelines  - Deferred starting antihypertensive medication due to borderline blood pressure readings (elevated 130s/mid 80s)  - Evaluated recent lab results, noting normal blood counts, liver and kidney function  - Reviewed EKG from August, found to be normal  - Continued mood stabilizer (Lamotrigine) and antidepressant (Fluoxetine) without changes  - Suspected possible bicep tear based on physical exam and patient-reported symptoms    ATTENTION-DEFICIT HYPERACTIVITY DISORDER (ADHD):  - Explained that Adderall does not build up in the system like some other medications (e.g., Fluoxetine) and can be skipped occasionally without significant issues.  - Discussed potential side effects of stimulant medications, including decreased appetite, sleep disturbances, and palpitations.  - Educated on the importance of not taking Adderall within 6-8 hours of bedtime to avoid sleep disruption.  - Informed about the body's ability to develop tolerance to stimulant medications over time.  - Linh to avoid taking Adderall within 6-8 hours of bedtime.  - Decreased Adderall from 20mg 3 times daily to 20mg twice daily.  - Refilled Adderall 20mg, #60 pills, with instructions for twice daily dosing.  - Provided 3 separate 30-day prescriptions dated  December 6th, January 5th, and February 4th.    MAJOR DEPRESSIVE DISORDER:  - Continued Fluoxetine 40mg daily.    UNSPECIFIED ANXIETY DISORDER:  - Continued Lamotrigine at current dose.    HYPERURICEMIA:  - Continued allopurinol 300mg daily.    THYROID FUNCTION:  - Explained the normal range for thyroid levels (0.4 to 4).  - Ordered thyroid function test for approximately 6 months from now.    DIABETES SCREENING:  - Explained the recommendation for diabetes screening (A1C) every 3 years for non-diabetic individuals.  - Ordered A1C (diabetes screening) for approximately 6 months from now.    TESTICULAR HYPOFUNCTION:  - Referred to urology for evaluation and management of testosterone replacement therapy.    ESSENTIAL HYPERTENSION:  - Linh to continue monitoring blood pressure.    STRAIN OF MUSCLES AT SHOULDER AND UPPER ARM:  - Considered referral to sports medicine for evaluation of potential bicep tear.    FOLLOW-UP:  - Follow up virtually in 90 days to assess Adderall efficacy and dosing.  - Follow up in office in 1 year for in-person evaluation, including cardiac assessment and potential EKG.  - Contact the office if experiencing any issues with medication changes or new symptoms.             CML (chronic myelocytic leukemia)  Comments:  Dx in Feb 2023.  Orders:  -     Ambulatory referral/consult to Urology; Future; Expected date: 12/13/2024  -     CBC Auto Differential; Future; Expected date: 12/06/2024  -     Comprehensive Metabolic Panel; Future; Expected date: 12/06/2024  -     Hemoglobin A1C; Future; Expected date: 12/06/2024  -     TSH; Future; Expected date: 12/06/2024  -     Lipid Panel; Future; Expected date: 12/06/2024    Essential hypertension  Comments:  fairly stable on meds and diet exercise  Orders:  -     atenoloL (TENORMIN) 50 MG tablet; Take 1 tablet (50 mg total) by mouth 2 (two) times daily.  Dispense: 60 tablet; Refill: 11    Tachycardia  Comments:  anxiety vs medications  Orders:  -      atenoloL (TENORMIN) 50 MG tablet; Take 1 tablet (50 mg total) by mouth 2 (two) times daily.  Dispense: 60 tablet; Refill: 11  -     CBC Auto Differential; Future; Expected date: 12/06/2024  -     Comprehensive Metabolic Panel; Future; Expected date: 12/06/2024  -     Hemoglobin A1C; Future; Expected date: 12/06/2024  -     TSH; Future; Expected date: 12/06/2024  -     Lipid Panel; Future; Expected date: 12/06/2024    Attention deficit hyperactivity disorder (ADHD), combined type  -     dextroamphetamine-amphetamine (ADDERALL) 20 mg tablet; Take 1 tablet by mouth 2 (two) times a day.  Dispense: 60 tablet; Refill: 0  -     dextroamphetamine-amphetamine (ADDERALL) 20 mg tablet; Take 1 tablet by mouth 2 (two) times a day.  Dispense: 60 tablet; Refill: 0  -     dextroamphetamine-amphetamine (ADDERALL) 20 mg tablet; Take 1 tablet by mouth 2 (two) times a day.  Dispense: 60 tablet; Refill: 0  -     CBC Auto Differential; Future; Expected date: 12/06/2024  -     Comprehensive Metabolic Panel; Future; Expected date: 12/06/2024  -     Hemoglobin A1C; Future; Expected date: 12/06/2024  -     TSH; Future; Expected date: 12/06/2024  -     Lipid Panel; Future; Expected date: 12/06/2024    Anxiety  -     FLUoxetine 40 MG capsule; Take 1 capsule (40 mg total) by mouth once daily.  Dispense: 90 capsule; Refill: 3  -     lamoTRIgine (LAMICTAL) 25 MG tablet; Take 2 tablets (50 mg total) by mouth every evening.  Dispense: 30 tablet; Refill: 11    Low testosterone in male  -     Ambulatory referral/consult to Urology; Future; Expected date: 12/13/2024    Other long term (current) drug therapy  -     CBC Auto Differential; Future; Expected date: 12/06/2024  -     Comprehensive Metabolic Panel; Future; Expected date: 12/06/2024  -     Hemoglobin A1C; Future; Expected date: 12/06/2024  -     TSH; Future; Expected date: 12/06/2024  -     Lipid Panel; Future; Expected date: 12/06/2024    Strain of right biceps, initial encounter  -      Ambulatory referral/consult to Sports Medicine; Future; Expected date: 12/13/2024                This note was generated with the assistance of ambient listening technology. Verbal consent was obtained by the patient and accompanying visitor(s) for the recording of patient appointment to facilitate this note. I attest to having reviewed and edited the generated note for accuracy, though some syntax or spelling errors may persist. Please contact the author of this note for any clarification.      Subjective:           Patient ID: Linh Schmidt   Age:  54 y.o.  Sex: male     Chief Complaint:   Medication Refill      History of Present Illness:    Linh Schmidt is a 54 y.o. male who presents today with a chief complaint of Medication Refill  .      ADHD medication refill discussed at visit.    Linh Schmidt is a 54 y.o. male, with ADHD and no significant comorbid conditions.  Is taking Dextroamphetamine/amphetamine short acting (Adderall) Daily    Medication has been effective.  Helping with better focus at work, completes tasks more consistently, and feels less distractable    Patient reports the following side effects: denies insomnia, denies heart palpitations, and decreased appetite or weight loss            History of Present Illness    CHIEF COMPLAINT:  Linh presents today for follow up.    CHRONIC MYELOID LEUKEMIA:  He was diagnosed with chronic myeloid leukemia years ago and reports regular follow-ups. He is currently on Dacitinib therapy and has achieved a major myeloma response. He recalls experiencing side pain prior to his diagnosis. He demonstrates difficulty recalling specific dates related to his diagnosis and treatment history.    HYPERTENSION:  He has a history of being on blood pressure medication. He reports his blood pressure has been consistently in the range of 130s-150s systolic over 80s diastolic for a long time.    MEDICATIONS:  Current medications include Allopurinol daily for  elevated uric acid levels, Fluoxetine 40 mg daily, Lamotrigine as a mood stabilizer following leukemia diagnosis, Flomax (prescribed during initial hospitalization for leukemia), testosterone (being monitored due to potential cardiovascular effects), and Adderall (dosage being reconsidered due to potential cardiac strain in combination with testosterone therapy). He denies any known drug allergies.    SOCIAL HISTORY:  He is a former smoker with rare alcohol use, consuming alcohol only once or twice a year. He denies recreational drug use, including marijuana. He reports being sexually active with his wife.    FAMILY HISTORY:  His biological father is in good health, maintains fitness through regular running, and has a lean physique. Father has a history of skin cancer and polyps, but denies any history of stroke or heart attack. Mother has a history of various medical issues since childhood. He reports concerns about possible early signs of dementia in his mother, noting cognitive changes during conversations.    MUSCULOSKELETAL:  He reports a recent injury to his bicep with visible bruising in the area. He denies experiencing any limitations in flexing the muscle despite the injury.      ROS:  Musculoskeletal: +muscle pain           Review of Systems   Constitutional: Negative.  Negative for fatigue and fever.   HENT: Negative.  Negative for congestion, rhinorrhea, sinus pressure and sinus pain.    Eyes: Negative.    Respiratory: Negative.  Negative for cough, shortness of breath and wheezing.    Cardiovascular: Negative.  Negative for chest pain, palpitations and leg swelling.   Gastrointestinal: Negative.  Negative for constipation, diarrhea, nausea and vomiting.   Endocrine: Negative.    Genitourinary: Negative.  Negative for difficulty urinating, frequency and urgency.   Musculoskeletal: Negative.    Skin:  Positive for color change (right bicep bruise).   Allergic/Immunologic: Negative for food allergies.  "  Neurological:  Negative for weakness and headaches.   Psychiatric/Behavioral:  Positive for decreased concentration and dysphoric mood. Negative for sleep disturbance. The patient is not nervous/anxious.            Objective:        Vitals:    12/06/24 1350 12/06/24 1451   BP: (!) 142/90 138/86   BP Location:  Left arm   Patient Position:  Sitting   Pulse: 77    Resp: 18    SpO2: 99%    Weight: 91.3 kg (201 lb 6.2 oz)    Height: 5' 9" (1.753 m)        Body mass index is 29.74 kg/m².      Physical Exam  Vitals reviewed.   Constitutional:       General: He is not in acute distress.     Appearance: Normal appearance. He is not ill-appearing.      Comments: As per BMI.   HENT:      Head: Normocephalic and atraumatic.      Right Ear: External ear normal.      Left Ear: External ear normal.      Nose: Nose normal.   Eyes:      Extraocular Movements: Extraocular movements intact.      Conjunctiva/sclera: Conjunctivae normal.   Cardiovascular:      Rate and Rhythm: Normal rate and regular rhythm.      Pulses: Normal pulses.      Heart sounds: No murmur heard.  Pulmonary:      Effort: Pulmonary effort is normal. No respiratory distress.      Breath sounds: No wheezing.   Musculoskeletal:         General: No swelling or deformity.      Cervical back: Normal range of motion.      Right lower leg: No edema.      Left lower leg: No edema.   Skin:     Capillary Refill: Capillary refill takes less than 2 seconds.      Findings: Bruising (right bicep) present.   Neurological:      General: No focal deficit present.      Mental Status: He is alert and oriented to person, place, and time.      Motor: No weakness.      Gait: Gait normal.   Psychiatric:         Mood and Affect: Mood normal.         Physical Exam    Vitals: Blood pressure: 142/90. Heart rate: 77 bpm.  Musculoskeletal: Bruising on bicep. Full flexion and extension of bicep. Good flexion and extension in pinky finger.               Past Medical History:   Diagnosis Date " "   Anxiety     GERD (gastroesophageal reflux disease)     Hypertension     Osteoarthritis        Lab Results   Component Value Date     10/14/2024    K 4.4 10/14/2024     10/14/2024    CO2 23 10/14/2024    BUN 14 10/14/2024    CREATININE 1.4 10/14/2024    ANIONGAP 9 10/14/2024     No results found for: "HGBA1C"  No results found for: "BNP", "BNPTRIAGEBLO"    Lab Results   Component Value Date    WBC 10.97 10/14/2024    HGB 16.8 10/14/2024    HCT 50.8 10/14/2024     10/14/2024    GRAN 5.7 10/14/2024    GRAN 52.2 10/14/2024     Lab Results   Component Value Date    CHOL 178 10/14/2024    HDL 49 10/14/2024    LDLCALC 107.6 10/14/2024    TRIG 107 10/14/2024        Outpatient Encounter Medications as of 12/6/2024   Medication Sig Dispense Refill    allopurinoL (ZYLOPRIM) 300 MG tablet TAKE 1 TABLET (300 MG TOTAL) BY MOUTH ONCE DAILY. 30 tablet 3    dasatinib (SPRYCEL) 100 mg Take 1 tablet (100 mg total) by mouth once daily. 30 tablet 11    ibuprofen (ADVIL,MOTRIN) 800 MG tablet Take 800 mg by mouth every 8 (eight) hours as needed.      multivitamin capsule Take 1 capsule by mouth once daily.      tamsulosin (FLOMAX) 0.4 mg Cap Take 1 capsule (0.4 mg total) by mouth once daily. 30 capsule 11    testosterone cypionate (DEPOTESTOTERONE CYPIONATE) 200 mg/mL injection INJECT 1 ML INTRAMUSCULARLY EVERY WEEK  1    [DISCONTINUED] atenoloL (TENORMIN) 50 MG tablet TAKE 1 TABLET BY MOUTH 2 TIMES DAILY. 60 tablet 1    [DISCONTINUED] dextroamphetamine-amphetamine (ADDERALL) 20 mg tablet Take 1 tablet by mouth 3 (three) times daily.      [DISCONTINUED] FLUoxetine 40 MG capsule Take 40 mg by mouth once daily.      [DISCONTINUED] lamoTRIgine (LAMICTAL) 25 MG tablet Take 50 mg by mouth every evening.      atenoloL (TENORMIN) 50 MG tablet Take 1 tablet (50 mg total) by mouth 2 (two) times daily. 60 tablet 11    [START ON 2/4/2025] dextroamphetamine-amphetamine (ADDERALL) 20 mg tablet Take 1 tablet by mouth 2 (two) times " a day. 60 tablet 0    [START ON 1/5/2025] dextroamphetamine-amphetamine (ADDERALL) 20 mg tablet Take 1 tablet by mouth 2 (two) times a day. 60 tablet 0    dextroamphetamine-amphetamine (ADDERALL) 20 mg tablet Take 1 tablet by mouth 2 (two) times a day. 60 tablet 0    FLUoxetine 40 MG capsule Take 1 capsule (40 mg total) by mouth once daily. 90 capsule 3    lamoTRIgine (LAMICTAL) 25 MG tablet Take 2 tablets (50 mg total) by mouth every evening. 30 tablet 11    [DISCONTINUED] atorvastatin (LIPITOR) 40 MG tablet Take 1 tablet (40 mg total) by mouth once daily. (Patient not taking: Reported on 7/16/2024) 90 tablet 3    [DISCONTINUED] butalbital-acetaminophen-caffeine -40 mg (FIORICET, ESGIC) -40 mg per tablet TAKE 1 TABLET BY MOUTH EVERY 6 HOURS AS NEEDED FOR PAIN OR FOR HEADACHE (Patient not taking: Reported on 1/16/2024) 120 tablet 0    [DISCONTINUED] LOKELMA 10 gram packet Take 10 g by mouth 3 (three) times daily. (Patient not taking: Reported on 4/16/2024)       No facility-administered encounter medications on file as of 12/6/2024.

## 2024-12-09 NOTE — PROGRESS NOTES
"Subjective:      Linh Schmidt is a 54 y.o. male who presents for evaluation of hypogonadism.      Long history of hypogonadism; currently self injecting 200 mg weekly.  Reports that he has been on the dose for several years and is happy with symptom control. Presents today to establish care due to current provider not seeing patients.  He does perform therapeutic phlebotomy every 3 months.  Denies bothersome LUTS, on Flomax  No previous PSA/CAMPBELL results available.   No personal or family hx of prostate cancer     The following portions of the patient's history were reviewed and updated as appropriate: allergies, current medications, past family history, past medical history, past social history, past surgical history and problem list.    Review of Systems  Constitutional: no fever or chills  ENT: no nasal congestion or sore throat  Respiratory: no cough or shortness of breath  Cardiovascular: no chest pain or palpitations  Gastrointestinal: no nausea or vomiting, tolerating diet  Genitourinary: as per HPI  Hematologic/Lymphatic: no easy bruising or lymphadenopathy  Musculoskeletal: no arthralgias or myalgias  Neurological: no seizures or tremors  Behavioral/Psych: no auditory or visual hallucinations     Objective:   Vitals: /78 (BP Location: Left arm, Patient Position: Sitting)   Pulse 81   Ht 5' 9" (1.753 m)   Wt 92.6 kg (204 lb 2.3 oz)   BMI 30.15 kg/m²     Physical Exam   General: alert and oriented, no acute distress  Head: normocephalic, atraumatic  Neck: supple, no lymphadenopathy, normal ROM, no masses  Respiratory: Symmetric expansion, non-labored breathing  Cardiovascular: regular rate and rhythm, nomal pulses, no peripheral edema  Abdomen: soft, non tender, non distended  Genitourinary: defer   Skin: normal coloration and turgor, no rashes, no suspicious skin lesions noted  Neuro: alert and oriented x3, no gross deficits  Psych: normal judgment and insight, normal mood/affect, and " non-anxious    Physical Exam    Lab Review   Urinalysis demonstrates negative for all components  Lab Results   Component Value Date    WBC 10.97 10/14/2024    HGB 16.8 10/14/2024    HCT 50.8 10/14/2024    MCV 94 10/14/2024     10/14/2024     Lab Results   Component Value Date    CREATININE 1.4 10/14/2024    BUN 14 10/14/2024     Component      Latest Ref Rng 2/6/2020 3/27/2020   Testosterone, Total      304 - 1227 ng/dL 1335 (H)  >1500 (H)       Bladder Scan PVR: 0 cc     Assessment and Plan:   1. CML (chronic myelocytic leukemia)  2. Low testosterone in male  - Prostate Specific Antigen, Diagnostic; Future  - Testosterone; Future  - CBC Auto Differential; Future     --patient has a long history of hypogonadism.  Currently self injecting 200 mg weekly without issue.  His PCP blood work is unavailable today will request.  Will plan to obtain CBC, trough testosterone level and PSA tomorrow. Refill after labs reviewed  --repeat labs every 6 months while on TRT; RTC yearly       This note is dictated on M*Modal word recognition program.  There are word recognition mistakes that are occasionally missed on review.

## 2024-12-10 DIAGNOSIS — M79.631 RIGHT FOREARM PAIN: Primary | ICD-10-CM

## 2024-12-11 ENCOUNTER — OFFICE VISIT (OUTPATIENT)
Dept: ORTHOPEDICS | Facility: CLINIC | Age: 54
End: 2024-12-11
Payer: COMMERCIAL

## 2024-12-11 ENCOUNTER — OFFICE VISIT (OUTPATIENT)
Dept: UROLOGY | Facility: CLINIC | Age: 54
End: 2024-12-11
Payer: COMMERCIAL

## 2024-12-11 VITALS
DIASTOLIC BLOOD PRESSURE: 78 MMHG | BODY MASS INDEX: 30.23 KG/M2 | SYSTOLIC BLOOD PRESSURE: 132 MMHG | WEIGHT: 204.13 LBS | HEIGHT: 69 IN | HEART RATE: 81 BPM

## 2024-12-11 VITALS
BODY MASS INDEX: 30 KG/M2 | SYSTOLIC BLOOD PRESSURE: 135 MMHG | HEART RATE: 63 BPM | WEIGHT: 203.13 LBS | DIASTOLIC BLOOD PRESSURE: 68 MMHG

## 2024-12-11 DIAGNOSIS — M25.521 RIGHT ELBOW PAIN: ICD-10-CM

## 2024-12-11 DIAGNOSIS — M25.511 RIGHT SHOULDER PAIN, UNSPECIFIED CHRONICITY: Primary | ICD-10-CM

## 2024-12-11 DIAGNOSIS — R79.89 LOW TESTOSTERONE IN MALE: ICD-10-CM

## 2024-12-11 DIAGNOSIS — S46.211A STRAIN OF RIGHT BICEPS, INITIAL ENCOUNTER: ICD-10-CM

## 2024-12-11 DIAGNOSIS — C92.10 CML (CHRONIC MYELOCYTIC LEUKEMIA): ICD-10-CM

## 2024-12-11 PROCEDURE — 99999 PR PBB SHADOW E&M-EST. PATIENT-LVL III: CPT | Mod: PBBFAC,,, | Performed by: ORTHOPAEDIC SURGERY

## 2024-12-11 PROCEDURE — 1159F MED LIST DOCD IN RCRD: CPT | Mod: CPTII,S$GLB,, | Performed by: NURSE PRACTITIONER

## 2024-12-11 PROCEDURE — 3075F SYST BP GE 130 - 139MM HG: CPT | Mod: CPTII,S$GLB,, | Performed by: NURSE PRACTITIONER

## 2024-12-11 PROCEDURE — 3078F DIAST BP <80 MM HG: CPT | Mod: CPTII,S$GLB,, | Performed by: NURSE PRACTITIONER

## 2024-12-11 PROCEDURE — 3008F BODY MASS INDEX DOCD: CPT | Mod: CPTII,S$GLB,, | Performed by: NURSE PRACTITIONER

## 2024-12-11 PROCEDURE — 1160F RVW MEDS BY RX/DR IN RCRD: CPT | Mod: CPTII,S$GLB,, | Performed by: NURSE PRACTITIONER

## 2024-12-11 PROCEDURE — 3075F SYST BP GE 130 - 139MM HG: CPT | Mod: CPTII,S$GLB,, | Performed by: ORTHOPAEDIC SURGERY

## 2024-12-11 PROCEDURE — 3078F DIAST BP <80 MM HG: CPT | Mod: CPTII,S$GLB,, | Performed by: ORTHOPAEDIC SURGERY

## 2024-12-11 PROCEDURE — 3008F BODY MASS INDEX DOCD: CPT | Mod: CPTII,S$GLB,, | Performed by: ORTHOPAEDIC SURGERY

## 2024-12-11 PROCEDURE — 99204 OFFICE O/P NEW MOD 45 MIN: CPT | Mod: S$GLB,,, | Performed by: ORTHOPAEDIC SURGERY

## 2024-12-11 PROCEDURE — 99999 PR PBB SHADOW E&M-EST. PATIENT-LVL IV: CPT | Mod: PBBFAC,,, | Performed by: NURSE PRACTITIONER

## 2024-12-11 PROCEDURE — 99214 OFFICE O/P EST MOD 30 MIN: CPT | Mod: S$GLB,,, | Performed by: NURSE PRACTITIONER

## 2024-12-11 NOTE — PROGRESS NOTES
Patient ID: Linh Schmidt is a 54 y.o. male    Chief Complaint:   Chief Complaint   Patient presents with    Right Forearm - Pain    Right Shoulder - Pain       History of Present Illness:    Pleasant 54-year-old male here for evaluation of right shoulder pain and swelling.  For the past week or so he has noticed pain and swelling in the upper biceps which began after twisting his wrist.  Did not necessarily feel a pop in his shoulder.  Noticed some bruising and ecchymosis.  Otherwise doing well with very little pain.    PAST MEDICAL HISTORY:   Past Medical History:   Diagnosis Date    Anxiety     GERD (gastroesophageal reflux disease)     Hypertension     Osteoarthritis      PAST SURGICAL HISTORY:   Past Surgical History:   Procedure Laterality Date    dental implants       FAMILY HISTORY:   Family History   Problem Relation Name Age of Onset    Mental illness Mother      Skin cancer Father      Colon polyps Father      Diabetes Maternal Grandmother      Heart disease Maternal Grandfather      Hyperlipidemia Maternal Grandfather      Stroke Maternal Grandfather      Heart attack Neg Hx      Lung cancer Neg Hx      Colon cancer Neg Hx      Prostate cancer Neg Hx       SOCIAL HISTORY:   Social History     Occupational History    Not on file   Tobacco Use    Smoking status: Former    Smokeless tobacco: Never   Substance and Sexual Activity    Alcohol use: Yes     Comment: rarely, less than montly    Drug use: No    Sexual activity: Yes     Partners: Female     Comment: wife        MEDICATIONS:   Current Outpatient Medications:     allopurinoL (ZYLOPRIM) 300 MG tablet, TAKE 1 TABLET (300 MG TOTAL) BY MOUTH ONCE DAILY., Disp: 30 tablet, Rfl: 3    atenoloL (TENORMIN) 50 MG tablet, Take 1 tablet (50 mg total) by mouth 2 (two) times daily., Disp: 60 tablet, Rfl: 11    dasatinib (SPRYCEL) 100 mg, Take 1 tablet (100 mg total) by mouth once daily., Disp: 30 tablet, Rfl: 11    [START ON 2/4/2025]  dextroamphetamine-amphetamine (ADDERALL) 20 mg tablet, Take 1 tablet by mouth 2 (two) times a day., Disp: 60 tablet, Rfl: 0    [START ON 1/5/2025] dextroamphetamine-amphetamine (ADDERALL) 20 mg tablet, Take 1 tablet by mouth 2 (two) times a day., Disp: 60 tablet, Rfl: 0    dextroamphetamine-amphetamine (ADDERALL) 20 mg tablet, Take 1 tablet by mouth 2 (two) times a day., Disp: 60 tablet, Rfl: 0    FLUoxetine 40 MG capsule, Take 1 capsule (40 mg total) by mouth once daily., Disp: 90 capsule, Rfl: 3    ibuprofen (ADVIL,MOTRIN) 800 MG tablet, Take 800 mg by mouth every 8 (eight) hours as needed., Disp: , Rfl:     lamoTRIgine (LAMICTAL) 25 MG tablet, Take 2 tablets (50 mg total) by mouth every evening., Disp: 30 tablet, Rfl: 11    multivitamin capsule, Take 1 capsule by mouth once daily., Disp: , Rfl:     tamsulosin (FLOMAX) 0.4 mg Cap, Take 1 capsule (0.4 mg total) by mouth once daily., Disp: 30 capsule, Rfl: 11    testosterone cypionate (DEPOTESTOTERONE CYPIONATE) 200 mg/mL injection, INJECT 1 ML INTRAMUSCULARLY EVERY WEEK, Disp: , Rfl: 1  ALLERGIES: Review of patient's allergies indicates:  No Known Allergies      Physical Exam     Vitals:    12/11/24 1023   BP: 135/68   Pulse: 63     Alert and oriented to person, place and time. No acute distress. Well-groomed, not ill appearing. Pupils round and reactive, normal respiratory effort, no audible wheezing.     On exam he has bruising and ecchymosis of the upper biceps and elbow.  There is some sagging of the biceps with Abrahan sign.  He has overall good strength with elbow flexion and shoulder flexion.  Neurovascularly intact.  Compartments soft compressible.  Palpable radial pulse      Imaging:       X-Ray: I have reviewed all pertinent results/findings and my personal findings are:  Negative right shoulder radiographs      Assessment & Plan    Strain of right biceps, initial encounter  -     Ambulatory referral/consult to Sports Medicine         Treatment options  were discussed with him in detail.  Likely partial or full biceps rupture of the long head.  We discussed treatment options including MRI for further evaluation.  We also discussed nonoperative treatment knowing that this will not have any long term consequence in regards to his function.  We also discussed possible cosmetic deformity with sagging of the biceps.  He is okay living with this if that is the case.  From my standpoint no surgical intervention warranted.  He will follow up as needed

## 2024-12-19 ENCOUNTER — PATIENT MESSAGE (OUTPATIENT)
Dept: ORTHOPEDICS | Facility: CLINIC | Age: 54
End: 2024-12-19
Payer: COMMERCIAL

## 2024-12-19 DIAGNOSIS — R33.9 URINARY RETENTION: ICD-10-CM

## 2024-12-19 RX ORDER — TAMSULOSIN HYDROCHLORIDE 0.4 MG/1
1 CAPSULE ORAL DAILY
Qty: 30 CAPSULE | Refills: 11 | Status: SHIPPED | OUTPATIENT
Start: 2024-12-19

## 2025-01-07 ENCOUNTER — PATIENT MESSAGE (OUTPATIENT)
Dept: ADMINISTRATIVE | Facility: HOSPITAL | Age: 55
End: 2025-01-07
Payer: COMMERCIAL

## 2025-01-08 DIAGNOSIS — Z12.11 SCREENING FOR COLON CANCER: ICD-10-CM

## 2025-01-10 ENCOUNTER — TELEPHONE (OUTPATIENT)
Dept: UROLOGY | Facility: CLINIC | Age: 55
End: 2025-01-10
Payer: COMMERCIAL

## 2025-02-06 DIAGNOSIS — C92.10 CHRONIC MYELOID LEUKEMIA: Primary | ICD-10-CM

## 2025-02-11 ENCOUNTER — OFFICE VISIT (OUTPATIENT)
Dept: HEMATOLOGY/ONCOLOGY | Facility: CLINIC | Age: 55
End: 2025-02-11
Payer: COMMERCIAL

## 2025-02-11 ENCOUNTER — LAB VISIT (OUTPATIENT)
Dept: LAB | Facility: HOSPITAL | Age: 55
End: 2025-02-11
Payer: COMMERCIAL

## 2025-02-11 VITALS
TEMPERATURE: 98 F | DIASTOLIC BLOOD PRESSURE: 67 MMHG | HEART RATE: 76 BPM | RESPIRATION RATE: 18 BRPM | WEIGHT: 208.31 LBS | HEIGHT: 69 IN | BODY MASS INDEX: 30.85 KG/M2 | SYSTOLIC BLOOD PRESSURE: 133 MMHG | OXYGEN SATURATION: 96 %

## 2025-02-11 DIAGNOSIS — Z79.899 OTHER LONG TERM (CURRENT) DRUG THERAPY: ICD-10-CM

## 2025-02-11 DIAGNOSIS — C92.10 CML (CHRONIC MYELOCYTIC LEUKEMIA): ICD-10-CM

## 2025-02-11 DIAGNOSIS — R00.0 TACHYCARDIA: ICD-10-CM

## 2025-02-11 DIAGNOSIS — F90.2 ATTENTION DEFICIT HYPERACTIVITY DISORDER (ADHD), COMBINED TYPE: ICD-10-CM

## 2025-02-11 DIAGNOSIS — C92.10 CHRONIC MYELOID LEUKEMIA: ICD-10-CM

## 2025-02-11 DIAGNOSIS — C92.10 CHRONIC MYELOID LEUKEMIA: Primary | ICD-10-CM

## 2025-02-11 LAB
ALBUMIN SERPL BCP-MCNC: 3.8 G/DL (ref 3.5–5.2)
ALP SERPL-CCNC: 93 U/L (ref 40–150)
ALT SERPL W/O P-5'-P-CCNC: 48 U/L (ref 10–44)
ANION GAP SERPL CALC-SCNC: 10 MMOL/L (ref 8–16)
AST SERPL-CCNC: 33 U/L (ref 10–40)
BASOPHILS # BLD AUTO: 0.04 K/UL (ref 0–0.2)
BASOPHILS NFR BLD: 0.5 % (ref 0–1.9)
BILIRUB SERPL-MCNC: 0.4 MG/DL (ref 0.1–1)
BUN SERPL-MCNC: 17 MG/DL (ref 6–20)
CALCIUM SERPL-MCNC: 9.2 MG/DL (ref 8.7–10.5)
CHLORIDE SERPL-SCNC: 106 MMOL/L (ref 95–110)
CHOLEST SERPL-MCNC: 199 MG/DL (ref 120–199)
CHOLEST/HDLC SERPL: 3.2 {RATIO} (ref 2–5)
CO2 SERPL-SCNC: 25 MMOL/L (ref 23–29)
CREAT SERPL-MCNC: 1.2 MG/DL (ref 0.5–1.4)
DIFFERENTIAL METHOD BLD: ABNORMAL
EOSINOPHIL # BLD AUTO: 0.3 K/UL (ref 0–0.5)
EOSINOPHIL NFR BLD: 3.8 % (ref 0–8)
ERYTHROCYTE [DISTWIDTH] IN BLOOD BY AUTOMATED COUNT: 13.2 % (ref 11.5–14.5)
EST. GFR  (NO RACE VARIABLE): >60 ML/MIN/1.73 M^2
ESTIMATED AVG GLUCOSE: 103 MG/DL (ref 68–131)
GLUCOSE SERPL-MCNC: 86 MG/DL (ref 70–110)
HBA1C MFR BLD: 5.2 % (ref 4–5.6)
HCT VFR BLD AUTO: 48 % (ref 40–54)
HDLC SERPL-MCNC: 62 MG/DL (ref 40–75)
HDLC SERPL: 31.2 % (ref 20–50)
HGB BLD-MCNC: 16.2 G/DL (ref 14–18)
IMM GRANULOCYTES # BLD AUTO: 0.08 K/UL (ref 0–0.04)
IMM GRANULOCYTES NFR BLD AUTO: 1.1 % (ref 0–0.5)
LDLC SERPL CALC-MCNC: 106.6 MG/DL (ref 63–159)
LYMPHOCYTES # BLD AUTO: 1.3 K/UL (ref 1–4.8)
LYMPHOCYTES NFR BLD: 16.8 % (ref 18–48)
MCH RBC QN AUTO: 31.6 PG (ref 27–31)
MCHC RBC AUTO-ENTMCNC: 33.8 G/DL (ref 32–36)
MCV RBC AUTO: 94 FL (ref 82–98)
MONOCYTES # BLD AUTO: 0.8 K/UL (ref 0.3–1)
MONOCYTES NFR BLD: 10 % (ref 4–15)
NEUTROPHILS # BLD AUTO: 5.2 K/UL (ref 1.8–7.7)
NEUTROPHILS NFR BLD: 67.8 % (ref 38–73)
NONHDLC SERPL-MCNC: 137 MG/DL
NRBC BLD-RTO: 0 /100 WBC
PLATELET # BLD AUTO: 188 K/UL (ref 150–450)
PMV BLD AUTO: 10.6 FL (ref 9.2–12.9)
POTASSIUM SERPL-SCNC: 4.4 MMOL/L (ref 3.5–5.1)
PROT SERPL-MCNC: 6.8 G/DL (ref 6–8.4)
RBC # BLD AUTO: 5.12 M/UL (ref 4.6–6.2)
SODIUM SERPL-SCNC: 141 MMOL/L (ref 136–145)
TRIGL SERPL-MCNC: 152 MG/DL (ref 30–150)
TSH SERPL DL<=0.005 MIU/L-ACNC: 1.38 UIU/ML (ref 0.4–4)
WBC # BLD AUTO: 7.61 K/UL (ref 3.9–12.7)

## 2025-02-11 PROCEDURE — 85025 COMPLETE CBC W/AUTO DIFF WBC: CPT | Performed by: STUDENT IN AN ORGANIZED HEALTH CARE EDUCATION/TRAINING PROGRAM

## 2025-02-11 PROCEDURE — 80053 COMPREHEN METABOLIC PANEL: CPT | Performed by: STUDENT IN AN ORGANIZED HEALTH CARE EDUCATION/TRAINING PROGRAM

## 2025-02-11 PROCEDURE — 80061 LIPID PANEL: CPT | Performed by: STUDENT IN AN ORGANIZED HEALTH CARE EDUCATION/TRAINING PROGRAM

## 2025-02-11 PROCEDURE — 84443 ASSAY THYROID STIM HORMONE: CPT | Performed by: STUDENT IN AN ORGANIZED HEALTH CARE EDUCATION/TRAINING PROGRAM

## 2025-02-11 PROCEDURE — 81206 BCR/ABL1 GENE MAJOR BP: CPT | Performed by: PHYSICIAN ASSISTANT

## 2025-02-11 PROCEDURE — 99999 PR PBB SHADOW E&M-EST. PATIENT-LVL IV: CPT | Mod: PBBFAC,,, | Performed by: PHYSICIAN ASSISTANT

## 2025-02-11 PROCEDURE — 36415 COLL VENOUS BLD VENIPUNCTURE: CPT | Performed by: PHYSICIAN ASSISTANT

## 2025-02-11 PROCEDURE — 83036 HEMOGLOBIN GLYCOSYLATED A1C: CPT | Performed by: STUDENT IN AN ORGANIZED HEALTH CARE EDUCATION/TRAINING PROGRAM

## 2025-02-11 NOTE — PROGRESS NOTES
HEMATOLOGIC MALIGNANCIES PROGRESS NOTE    IDENTIFYING STATEMENT   Linh Morales) is a 54 y.o. male with a  of 1970 from [place], [place] with the diagnosis of chronic myeloid leukemia.      ONCOLOGY HISTORY:    Chronic myeloid leukemia   2023: ED visit for left lateral abdominal pain. Abdominal Xray shows splenomegaly.   2023: Sent to the emergency department for abnormal labs. Left flank pain x 2 months.  K, Hgb 11.2 g/dL, Plt 143 K,  Cr 1.4, normal LFTs. Abdominal US with hepatosplenomegaly. Patient given hydrea and allopurinol, unable to transfer to [facility] [place] due to diversion.   2023: Presents to [facility] [place] ED. Improved WBC with hydrea. WBC 297K, Hgb 10.9 g/dL, 140K. Differential with basophilia, myelocytes and metamyelocytes.   2023: Bone marrow biopsy - chronic myeloid leukemia, in chronic phase. BCR/ABL p210 transcript > 55%.   3/8/2023: Started dasatinib   2. HLD  3. Depression  4. Testosterone deficiency   5. Headaches     DATE   BCR/ABL p210  2023  >55% (started dasatinib 3/8/2023)  2023  0.2%  10/3/2023  0.05%  2024  0.1%  2024  0.05%    INTERVAL HISTORY:      Mr. Schmidt returns to clinic in follow-up of CML. He is feeling well at this time. He denies any adverse effects of dasatinib and reports adherence. He denies limb swelling, cough, shortness of breath, heart palpitations, nausea/vomiting, cramps, rash.     He did injury his right bicep/shoulder (tendon injury) in December and is following with orthopedics. No significant paint today. Considering MRI.     BCR/ABL lab was not drawn though CBC/WBC stable, repeat labs today.     Past Medical History, Past Social History and Past Family History have been reviewed and are unchanged except as noted in the interval history.    MEDICATIONS:     Current Outpatient Medications on File Prior to Visit   Medication Sig Dispense Refill    allopurinoL (ZYLOPRIM) 300 MG tablet TAKE 1 TABLET (300 MG TOTAL) BY MOUTH  ONCE DAILY. 30 tablet 3    atenoloL (TENORMIN) 50 MG tablet Take 1 tablet (50 mg total) by mouth 2 (two) times daily. 60 tablet 11    dasatinib (SPRYCEL) 100 mg Take 1 tablet (100 mg total) by mouth once daily. 30 tablet 11    dextroamphetamine-amphetamine (ADDERALL) 20 mg tablet Take 1 tablet by mouth 2 (two) times a day. 60 tablet 0    FLUoxetine 40 MG capsule Take 1 capsule (40 mg total) by mouth once daily. 90 capsule 3    ibuprofen (ADVIL,MOTRIN) 800 MG tablet Take 800 mg by mouth every 8 (eight) hours as needed.      lamoTRIgine (LAMICTAL) 25 MG tablet Take 2 tablets (50 mg total) by mouth every evening. 30 tablet 11    multivitamin capsule Take 1 capsule by mouth once daily.      tamsulosin (FLOMAX) 0.4 mg Cap TAKE 1 CAPSULE (0.4 MG TOTAL) BY MOUTH ONCE DAILY. 30 capsule 11    testosterone cypionate (DEPOTESTOTERONE CYPIONATE) 200 mg/mL injection INJECT 1 ML INTRAMUSCULARLY EVERY WEEK  1    dextroamphetamine-amphetamine (ADDERALL) 20 mg tablet Take 1 tablet by mouth 2 (two) times a day. (Patient not taking: Reported on 2/11/2025) 60 tablet 0    dextroamphetamine-amphetamine (ADDERALL) 20 mg tablet Take 1 tablet by mouth 2 (two) times a day. (Patient not taking: Reported on 2/11/2025) 60 tablet 0     No current facility-administered medications on file prior to visit.       ALLERGIES: Review of patient's allergies indicates:  No Known Allergies     ROS:       Review of Systems   Constitutional:  Negative for diaphoresis, fatigue, fever and unexpected weight change.   HENT:   Negative for lump/mass and sore throat.    Eyes:  Negative for icterus.   Respiratory:  Negative for cough and shortness of breath.    Cardiovascular:  Negative for chest pain and palpitations.   Gastrointestinal:  Negative for abdominal distention, constipation, diarrhea, nausea and vomiting.   Genitourinary:  Negative for dysuria and frequency.    Musculoskeletal:  Negative for arthralgias, gait problem and myalgias.   Skin:  Negative  "for rash.   Neurological:  Negative for dizziness, gait problem and headaches.   Hematological:  Negative for adenopathy. Does not bruise/bleed easily.   Psychiatric/Behavioral:  The patient is not nervous/anxious.        PHYSICAL EXAM:  Vitals:    02/11/25 1332   BP: 133/67   Pulse: 76   Resp: 18   Temp: 98 °F (36.7 °C)   TempSrc: Oral   SpO2: 96%   Weight: 94.5 kg (208 lb 5.4 oz)   Height: 5' 9" (1.753 m)   PainSc: 0-No pain       KARNOFSKY PERFORMANCE STATUS 100%  ECOG 0    Physical Exam  Constitutional:       General: He is not in acute distress.     Appearance: He is well-developed.   HENT:      Head: Normocephalic and atraumatic.      Mouth/Throat:      Mouth: No oral lesions.   Eyes:      Conjunctiva/sclera: Conjunctivae normal.   Neck:      Thyroid: No thyromegaly.   Cardiovascular:      Rate and Rhythm: Normal rate and regular rhythm.      Heart sounds: Normal heart sounds. No murmur heard.  Pulmonary:      Breath sounds: Normal breath sounds. No wheezing or rales.   Abdominal:      General: There is no distension.      Palpations: Abdomen is soft. There is no hepatomegaly, splenomegaly or mass.      Tenderness: There is no abdominal tenderness.   Lymphadenopathy:      Cervical: No cervical adenopathy.      Right cervical: No deep cervical adenopathy.     Left cervical: No deep cervical adenopathy.   Skin:     Findings: No rash.   Neurological:      Mental Status: He is alert and oriented to person, place, and time.      Cranial Nerves: No cranial nerve deficit.      Coordination: Coordination normal.      Deep Tendon Reflexes: Reflexes are normal and symmetric.         LAB:   Results for orders placed or performed in visit on 02/10/25   CBC W/ AUTO DIFFERENTIAL    Collection Time: 02/10/25  1:10 PM   Result Value Ref Range    WBC 11.16 3.90 - 12.70 K/uL    RBC 5.50 4.60 - 6.20 M/uL    Hemoglobin 17.2 14.0 - 18.0 g/dL    Hematocrit 52.4 40.0 - 54.0 %    MCV 95 82 - 98 fL    MCH 31.3 (H) 27.0 - 31.0 pg    " MCHC 32.8 32.0 - 36.0 g/dL    RDW 13.4 11.5 - 14.5 %    Platelets 175 150 - 450 K/uL    MPV 10.4 9.2 - 12.9 fL    Immature Granulocytes 1.3 (H) 0.0 - 0.5 %    Gran # (ANC) 5.8 1.8 - 7.7 K/uL    Immature Grans (Abs) 0.14 (H) 0.00 - 0.04 K/uL    Lymph # 3.6 1.0 - 4.8 K/uL    Mono # 1.2 (H) 0.3 - 1.0 K/uL    Eos # 0.4 0.0 - 0.5 K/uL    Baso # 0.06 0.00 - 0.20 K/uL    nRBC 0 0 /100 WBC    Gran % 52.4 38.0 - 73.0 %    Lymph % 32.3 18.0 - 48.0 %    Mono % 10.4 4.0 - 15.0 %    Eosinophil % 3.1 0.0 - 8.0 %    Basophil % 0.5 0.0 - 1.9 %    Differential Method Automated    CMP    Collection Time: 02/10/25  1:10 PM   Result Value Ref Range    Sodium 138 136 - 145 mmol/L    Potassium 4.1 3.5 - 5.1 mmol/L    Chloride 105 95 - 110 mmol/L    CO2 24 23 - 29 mmol/L    Glucose 122 (H) 70 - 110 mg/dL    BUN 13 6 - 20 mg/dL    Creatinine 1.3 0.5 - 1.4 mg/dL    Calcium 9.9 8.7 - 10.5 mg/dL    Total Protein 7.2 6.0 - 8.4 g/dL    Albumin 4.0 3.5 - 5.2 g/dL    Total Bilirubin 0.4 0.1 - 1.0 mg/dL    Alkaline Phosphatase 87 40 - 150 U/L    AST 32 10 - 40 U/L    ALT 52 (H) 10 - 44 U/L    eGFR >60.0 >60 mL/min/1.73 m^2    Anion Gap 9 8 - 16 mmol/L   LDH    Collection Time: 02/10/25  1:10 PM   Result Value Ref Range     110 - 260 U/L       PROBLEMS ASSESSED THIS VISIT:    1. Chronic myeloid leukemia        PLAN:       Chronic myeloid leukemia  Mr. Schmidt has maintained major molecular response (MMR) on dasatinib therapy. He is currently at response level MR 3.0. Most recent BCR-ABL 0.05% from July consistent with ongoing MMR, will obtain repeat BCR-ABL today.    He is tolerating dasatinib well without significant adverse effect. Continue current therapy.    Lesion of Lip  Small, circular, purple lesion to L lower lip. Suspect hemangioma. Has never had dermatology evaluation, does have family history of skin cancer - will refer to dermatology for skin eval to be cautious.       Follow-up  3 months       BMT Chart Routing      Follow up  with physician 3 months. f/u with mildred in 3 months (virtual ok)   Follow up with [place]    Provider visit type    Infusion scheduling note    Injection scheduling note    Labs CBC, CMP and BCR/ABL   Scheduling:  Preferred lab:  Lab interval:  labs 1 week before f/u (local, close to [place] if possible)   Imaging    Pharmacy appointment    Other referrals                    Grisel Thornton PA-C  Hematology and Stem Cell Transplant

## 2025-02-13 LAB
BCR/ABL,P210 RESULT: NORMAL
PATH REPORT.FINAL DX SPEC: NORMAL
SPECIMEN TYPE: NORMAL

## 2025-02-17 ENCOUNTER — RESULTS FOLLOW-UP (OUTPATIENT)
Dept: HEMATOLOGY/ONCOLOGY | Facility: CLINIC | Age: 55
End: 2025-02-17

## 2025-03-06 ENCOUNTER — OFFICE VISIT (OUTPATIENT)
Dept: PRIMARY CARE CLINIC | Facility: CLINIC | Age: 55
End: 2025-03-06
Payer: COMMERCIAL

## 2025-03-06 DIAGNOSIS — R79.89 LOW TESTOSTERONE IN MALE: ICD-10-CM

## 2025-03-06 DIAGNOSIS — C92.10 CML (CHRONIC MYELOCYTIC LEUKEMIA): ICD-10-CM

## 2025-03-06 DIAGNOSIS — F90.2 ATTENTION DEFICIT HYPERACTIVITY DISORDER (ADHD), COMBINED TYPE: ICD-10-CM

## 2025-03-06 DIAGNOSIS — I10 PRIMARY HYPERTENSION: Primary | ICD-10-CM

## 2025-03-06 RX ORDER — DEXTROAMPHETAMINE SACCHARATE, AMPHETAMINE ASPARTATE, DEXTROAMPHETAMINE SULFATE AND AMPHETAMINE SULFATE 5; 5; 5; 5 MG/1; MG/1; MG/1; MG/1
1 TABLET ORAL 2 TIMES DAILY
Qty: 60 TABLET | Refills: 0 | Status: SHIPPED | OUTPATIENT
Start: 2025-03-12

## 2025-03-06 RX ORDER — DEXTROAMPHETAMINE SACCHARATE, AMPHETAMINE ASPARTATE, DEXTROAMPHETAMINE SULFATE AND AMPHETAMINE SULFATE 5; 5; 5; 5 MG/1; MG/1; MG/1; MG/1
1 TABLET ORAL 2 TIMES DAILY
Qty: 60 TABLET | Refills: 0 | Status: SHIPPED | OUTPATIENT
Start: 2025-05-11

## 2025-03-06 RX ORDER — DEXTROAMPHETAMINE SACCHARATE, AMPHETAMINE ASPARTATE, DEXTROAMPHETAMINE SULFATE AND AMPHETAMINE SULFATE 5; 5; 5; 5 MG/1; MG/1; MG/1; MG/1
1 TABLET ORAL 2 TIMES DAILY
Qty: 60 TABLET | Refills: 0 | Status: SHIPPED | OUTPATIENT
Start: 2025-04-11

## 2025-03-06 NOTE — PROGRESS NOTES
The patient location is: [place]  The chief complaint leading to consultation is: 3 month med refill appt    Visit type: audiovisual    Face to Face time with patient: 15  15 minutes of total time spent on the encounter, which includes face to face time and non-face to face time preparing to see the patient (eg, review of tests), Obtaining and/or reviewing separately obtained history, Documenting clinical information in the electronic or other health record, Independently interpreting results (not separately reported) and communicating results to the patient/family/caregiver, or Care coordination (not separately reported).         Each patient to whom he or she provides medical services by telemedicine is:  (1) informed of the relationship between the physician and patient and the respective role of any other health care provider with respect to management of the patient; and (2) notified that he or she may decline to receive medical services by telemedicine and may withdraw from such care at any time.    Notes:       ADHD medication refill discussed at visit.    Linh Schmidt is a 54 y.o. male, with ADHD and no significant comorbid conditions.  Is taking Dextroamphetamine/amphetamine short acting (Adderall) Daily    Medication has been effective.  Helping with better focus at work and completes tasks more consistently    Patient reports the following side effects: denies decreased appetite, denies insomnia, and denies heart palpitations        Linh presents today for ADHD medication refill.    He takes Adderall 20 mg twice daily (8 AM and noon/1 PM), reporting high effectiveness for focus. He takes it most days including weekends and denies side effects such as insomnia, appetite changes, palpitations, or chest pain.    He reports improved urine stream with Flomax 0.4 mg daily but continues to experience nocturia.    He takes Fluoxetine 40 mg daily for depression, Allopurinol 300 mg daily initiated  during chemotherapy, and Flomax 0.4 mg daily.    Uric acid levels have decreased from 9 to 2-4 since starting Allopurinol.    Cardiovascular: -chest pain, -palpitations  Genitourinary: +nocturia    Physical Exam  Constitutional:       General: He is not in acute distress.     Appearance: Normal appearance. He is not ill-appearing.   HENT:      Head: Normocephalic and atraumatic.      Right Ear: External ear normal.      Left Ear: External ear normal.   Eyes:      Extraocular Movements: Extraocular movements intact.   Pulmonary:      Effort: No respiratory distress.   Neurological:      Mental Status: He is alert and oriented to person, place, and time.   Psychiatric:         Mood and Affect: Mood normal.         Behavior: Behavior normal.         C92.10 CML (chronic myelocytic leukemia)  F90.2 ADHD, combined type  I10 Primary hypertension  R79.89 Low testosterone in male    IMPRESSION:  - Continued Adderall for ADHD management, noting effectiveness and absence of side effects  - Continued fluoxetine for depression treatment  - Deferred changes to allopurinol pending oncologist input, considering pre-chemotherapy uric acid levels and current effectiveness  - Continued Flomax at current dose for urinary symptoms, noting improvement but persistent nocturia    PLAN SUMMARY:  - Continue Adderall 20 mg twice daily (8 AM and 12-1 PM)  - Maintain fluoxetine 40 mg daily  - Continue Flomax (tamsulosin) 0.4 mg daily  - Continue allopurinol 300 mg daily  - Follow-up with oncologist for review of treatment plan    C92.10 CML (CHRONIC MYELOCYTIC LEUKEMIA):  Elevated Uric Acid:  - Continued allopurinol 300 mg daily.  - Scheduled follow-up with oncologist for review of treatment plan.    F90.2 ADHD, COMBINED TYPE:  - Continued Adderall 20 mg twice daily, to be taken at 8 AM and 12 PM or 1 PM.    Anxiety:  - Maintained fluoxetine 40 mg daily dosage.    Decreased urination:  - Continued Flomax (tamsulosin) 0.4 mg daily for management  of symptoms associated with low testosterone.    R79.89 LOW TESTOSTERONE IN MALE:   - as per urology.

## 2025-03-13 DIAGNOSIS — C92.10 CHRONIC MYELOID LEUKEMIA: ICD-10-CM

## 2025-03-17 RX ORDER — ALLOPURINOL 300 MG/1
300 TABLET ORAL DAILY
Qty: 30 TABLET | Refills: 3 | Status: SHIPPED | OUTPATIENT
Start: 2025-03-17

## 2025-04-08 ENCOUNTER — TELEPHONE (OUTPATIENT)
Dept: DERMATOLOGY | Facility: CLINIC | Age: 55
End: 2025-04-08
Payer: COMMERCIAL

## 2025-04-09 ENCOUNTER — OFFICE VISIT (OUTPATIENT)
Dept: DERMATOLOGY | Facility: CLINIC | Age: 55
End: 2025-04-09
Payer: COMMERCIAL

## 2025-04-09 ENCOUNTER — TELEPHONE (OUTPATIENT)
Dept: DERMATOLOGY | Facility: CLINIC | Age: 55
End: 2025-04-09
Payer: COMMERCIAL

## 2025-04-09 DIAGNOSIS — R23.8 VENOUS LAKE: ICD-10-CM

## 2025-04-09 PROCEDURE — 3044F HG A1C LEVEL LT 7.0%: CPT | Mod: CPTII,S$GLB,, | Performed by: STUDENT IN AN ORGANIZED HEALTH CARE EDUCATION/TRAINING PROGRAM

## 2025-04-09 PROCEDURE — 99999 PR PBB SHADOW E&M-EST. PATIENT-LVL III: CPT | Mod: PBBFAC,,, | Performed by: STUDENT IN AN ORGANIZED HEALTH CARE EDUCATION/TRAINING PROGRAM

## 2025-04-09 PROCEDURE — 1160F RVW MEDS BY RX/DR IN RCRD: CPT | Mod: CPTII,S$GLB,, | Performed by: STUDENT IN AN ORGANIZED HEALTH CARE EDUCATION/TRAINING PROGRAM

## 2025-04-09 PROCEDURE — 99202 OFFICE O/P NEW SF 15 MIN: CPT | Mod: S$GLB,,, | Performed by: STUDENT IN AN ORGANIZED HEALTH CARE EDUCATION/TRAINING PROGRAM

## 2025-04-09 PROCEDURE — 1159F MED LIST DOCD IN RCRD: CPT | Mod: CPTII,S$GLB,, | Performed by: STUDENT IN AN ORGANIZED HEALTH CARE EDUCATION/TRAINING PROGRAM

## 2025-04-09 NOTE — TELEPHONE ENCOUNTER
----- Message from Jerome Moy MD sent at 4/9/2025  8:46 AM CDT -----  He has a venous lake on his lip and would like a cosmetic consultation to discuss laser or other options

## 2025-04-09 NOTE — PROGRESS NOTES
Subjective:      Patient ID:  Linh Schmidt is a 54 y.o. male who presents for   Chief Complaint   Patient presents with    Lesion     L corner of mouth      Pt states he has growth inner corner of mouth   Pt states it has been there for 10 years   Pt states no pain with growth but getting bigger       Review of Systems   Skin:  Negative for itching, rash, dry skin, daily sunscreen use, activity-related sunscreen use, recent sunburn and wears hat.   Hematologic/Lymphatic: Bruises/bleeds easily (bruise on asa).       Objective:   Physical Exam   Constitutional: He appears well-developed and well-nourished. No distress.   Neurological: He is alert and oriented to person, place, and time. He is not disoriented.   Psychiatric: He has a normal mood and affect.   Skin:   Areas Examined (abnormalities noted in diagram):   Head / Face Inspection Performed            Diagram Legend     Erythematous scaling macule/papule c/w actinic keratosis       Vascular papule c/w angioma      Pigmented verrucoid papule/plaque c/w seborrheic keratosis      Yellow umbilicated papule c/w sebaceous hyperplasia      Irregularly shaped tan macule c/w lentigo     1-2 mm smooth white papules consistent with Milia      Movable subcutaneous cyst with punctum c/w epidermal inclusion cyst      Subcutaneous movable cyst c/w pilar cyst      Firm pink to brown papule c/w dermatofibroma      Pedunculated fleshy papule(s) c/w skin tag(s)      Evenly pigmented macule c/w junctional nevus     Mildly variegated pigmented, slightly irregular-bordered macule c/w mildly atypical nevus      Flesh colored to evenly pigmented papule c/w intradermal nevus       Pink pearly papule/plaque c/w basal cell carcinoma      Erythematous hyperkeratotic cursted plaque c/w SCC      Surgical scar with no sign of skin cancer recurrence      Open and closed comedones      Inflammatory papules and pustules      Verrucoid papule consistent consistent with wart      Erythematous eczematous patches and plaques     Dystrophic onycholytic nail with subungual debris c/w onychomycosis     Umbilicated papule    Erythematous-base heme-crusted tan verrucoid plaque consistent with inflamed seborrheic keratosis     Erythematous Silvery Scaling Plaque c/w Psoriasis     See annotation      Assessment / Plan:        Venous lake  -     Ambulatory referral/consult to Dermatology    Reassurance given to patient. No treatment is necessary.   Treatment of benign, asymptomatic lesions may be considered cosmetic.    Will refer to Dr. Abarca for cosmetic consultation for possible vascular laser         Follow up if symptoms worsen or fail to improve.

## 2025-04-09 NOTE — Clinical Note
He has a venous lake on his lip and would like a cosmetic consultation to discuss laser or other options

## 2025-04-15 DIAGNOSIS — C92.10 CHRONIC MYELOID LEUKEMIA: ICD-10-CM

## 2025-04-15 RX ORDER — DASATINIB 100 MG/1
100 TABLET, FILM COATED ORAL DAILY
Qty: 30 TABLET | Refills: 0 | Status: SHIPPED | OUTPATIENT
Start: 2025-04-15

## 2025-04-15 NOTE — TELEPHONE ENCOUNTER
Attempted to call to set up and confirm appointments with pt. Unable to connect and voicemail not set up.

## 2025-06-11 DIAGNOSIS — C92.10 CHRONIC MYELOID LEUKEMIA: ICD-10-CM

## 2025-06-12 RX ORDER — DASATINIB 100 MG/1
100 TABLET, FILM COATED ORAL DAILY
Qty: 30 TABLET | Refills: 0 | Status: SHIPPED | OUTPATIENT
Start: 2025-06-12

## 2025-06-17 DIAGNOSIS — F90.2 ATTENTION DEFICIT HYPERACTIVITY DISORDER (ADHD), COMBINED TYPE: ICD-10-CM

## 2025-06-17 RX ORDER — DEXTROAMPHETAMINE SACCHARATE, AMPHETAMINE ASPARTATE, DEXTROAMPHETAMINE SULFATE AND AMPHETAMINE SULFATE 5; 5; 5; 5 MG/1; MG/1; MG/1; MG/1
1 TABLET ORAL 2 TIMES DAILY
Qty: 60 TABLET | Refills: 0 | OUTPATIENT
Start: 2025-06-17

## 2025-06-17 NOTE — TELEPHONE ENCOUNTER
No care due was identified.  [facility] Embedded Care Due Messages. Reference number: [id number].   6/17/2025 3:03:59 PM CDT

## 2025-07-03 ENCOUNTER — OFFICE VISIT (OUTPATIENT)
Dept: PRIMARY CARE CLINIC | Facility: CLINIC | Age: 55
End: 2025-07-03
Payer: COMMERCIAL

## 2025-07-03 DIAGNOSIS — Z91.199 NO-SHOW FOR APPOINTMENT: Primary | ICD-10-CM

## 2025-07-03 PROCEDURE — 99499 UNLISTED E&M SERVICE: CPT | Mod: 95,,, | Performed by: STUDENT IN AN ORGANIZED HEALTH CARE EDUCATION/TRAINING PROGRAM

## 2025-07-03 NOTE — PROGRESS NOTES
Patient logged on for 4:20 appt at 5:08PM.    Will not be able to be seen today and will need to reschedule an appt to get refills on meds.    - Dr. Dumont

## 2025-07-07 ENCOUNTER — OFFICE VISIT (OUTPATIENT)
Dept: PRIMARY CARE CLINIC | Facility: CLINIC | Age: 55
End: 2025-07-07
Payer: COMMERCIAL

## 2025-07-07 VITALS
OXYGEN SATURATION: 98 % | BODY MASS INDEX: 28.39 KG/M2 | HEIGHT: 69 IN | SYSTOLIC BLOOD PRESSURE: 118 MMHG | DIASTOLIC BLOOD PRESSURE: 80 MMHG | HEART RATE: 64 BPM | WEIGHT: 191.69 LBS

## 2025-07-07 DIAGNOSIS — C92.10 CML (CHRONIC MYELOCYTIC LEUKEMIA): ICD-10-CM

## 2025-07-07 DIAGNOSIS — I10 PRIMARY HYPERTENSION: ICD-10-CM

## 2025-07-07 DIAGNOSIS — Z00.00 ANNUAL PHYSICAL EXAM: Primary | ICD-10-CM

## 2025-07-07 DIAGNOSIS — F41.9 ANXIETY: ICD-10-CM

## 2025-07-07 DIAGNOSIS — F90.2 ATTENTION DEFICIT HYPERACTIVITY DISORDER (ADHD), COMBINED TYPE: ICD-10-CM

## 2025-07-07 PROCEDURE — 99999 PR PBB SHADOW E&M-EST. PATIENT-LVL IV: CPT | Mod: PBBFAC,,, | Performed by: STUDENT IN AN ORGANIZED HEALTH CARE EDUCATION/TRAINING PROGRAM

## 2025-07-07 PROCEDURE — 99214 OFFICE O/P EST MOD 30 MIN: CPT | Mod: S$GLB,,, | Performed by: STUDENT IN AN ORGANIZED HEALTH CARE EDUCATION/TRAINING PROGRAM

## 2025-07-07 PROCEDURE — 3079F DIAST BP 80-89 MM HG: CPT | Mod: CPTII,S$GLB,, | Performed by: STUDENT IN AN ORGANIZED HEALTH CARE EDUCATION/TRAINING PROGRAM

## 2025-07-07 PROCEDURE — 3074F SYST BP LT 130 MM HG: CPT | Mod: CPTII,S$GLB,, | Performed by: STUDENT IN AN ORGANIZED HEALTH CARE EDUCATION/TRAINING PROGRAM

## 2025-07-07 PROCEDURE — 3044F HG A1C LEVEL LT 7.0%: CPT | Mod: CPTII,S$GLB,, | Performed by: STUDENT IN AN ORGANIZED HEALTH CARE EDUCATION/TRAINING PROGRAM

## 2025-07-07 PROCEDURE — 1159F MED LIST DOCD IN RCRD: CPT | Mod: CPTII,S$GLB,, | Performed by: STUDENT IN AN ORGANIZED HEALTH CARE EDUCATION/TRAINING PROGRAM

## 2025-07-07 PROCEDURE — 3008F BODY MASS INDEX DOCD: CPT | Mod: CPTII,S$GLB,, | Performed by: STUDENT IN AN ORGANIZED HEALTH CARE EDUCATION/TRAINING PROGRAM

## 2025-07-07 PROCEDURE — 1160F RVW MEDS BY RX/DR IN RCRD: CPT | Mod: CPTII,S$GLB,, | Performed by: STUDENT IN AN ORGANIZED HEALTH CARE EDUCATION/TRAINING PROGRAM

## 2025-07-07 RX ORDER — DEXTROAMPHETAMINE SACCHARATE, AMPHETAMINE ASPARTATE, DEXTROAMPHETAMINE SULFATE AND AMPHETAMINE SULFATE 5; 5; 5; 5 MG/1; MG/1; MG/1; MG/1
1 TABLET ORAL 2 TIMES DAILY
Qty: 60 TABLET | Refills: 0 | Status: SHIPPED | OUTPATIENT
Start: 2025-08-06

## 2025-07-07 RX ORDER — DEXTROAMPHETAMINE SACCHARATE, AMPHETAMINE ASPARTATE, DEXTROAMPHETAMINE SULFATE AND AMPHETAMINE SULFATE 5; 5; 5; 5 MG/1; MG/1; MG/1; MG/1
1 TABLET ORAL 2 TIMES DAILY
Qty: 60 TABLET | Refills: 0 | Status: SHIPPED | OUTPATIENT
Start: 2025-09-05

## 2025-07-07 RX ORDER — DEXTROAMPHETAMINE SACCHARATE, AMPHETAMINE ASPARTATE, DEXTROAMPHETAMINE SULFATE AND AMPHETAMINE SULFATE 5; 5; 5; 5 MG/1; MG/1; MG/1; MG/1
1 TABLET ORAL 2 TIMES DAILY
Qty: 60 TABLET | Refills: 0 | Status: SHIPPED | OUTPATIENT
Start: 2025-07-07

## 2025-07-07 NOTE — PROGRESS NOTES
Assessment:       1. Annual physical exam    2. Attention deficit hyperactivity disorder (ADHD), combined type    3. CML (chronic myelocytic leukemia)    4. Primary hypertension    5. Anxiety           Plan:     Assessment & Plan    C92.10 CML (chronic myelocytic leukemia)  Z00.00 Annual physical exam  F90.2 ADHD, combined type  I10 Primary hypertension  F41.9 Anxiety    PLAN SUMMARY:  - Discontinue lamotrigine (previously 25 mg twice daily)  - Discontinue allopurinol  - Start Adderall 20 mg twice daily  - Continue atenolol at current dose  - Continue testosterone injections once weekly  - Consider referral to in-house counselor for anxiety and depression management  - Follow up with CML specialist in approximately 3 months    C92.10 CML (CHRONIC MYELOCYTIC LEUKEMIA):  - Reviewed CML status and recent lab results, noting current response on derivative therapy.  - Follow up with CML specialist in approximately 3 months.    Z00.00 ANNUAL PHYSICAL EXAM:  - Evaluated cardiovascular health, noting normal EKG from August of previous year.  - Discontinued allopurinol due to lack of recent gout flares or uric acid concerns.  - Continued testosterone injections once weekly.  - Explained importance of regular health check-ups and screenings, including PSA tests.    F90.2 ADHD, COMBINED TYPE:  - Assessed ADHD symptoms and medication efficacy.  - Started Adderall 20 mg twice daily.    I10 PRIMARY HYPERTENSION:  - Continued atenolol (beta blocker) at current dose.    F41.9 ANXIETY:  - Discussed lamotrigine's role in managing mood and anxiety, potential effects of discontinuation, and decided to discontinue (previously 25 mg twice daily).  - Noted family history of anxiety and depression, considering impact on mental health management.  - Addressed stigma associated with mental health medications, emphasizing their role in maintaining overall well-being.  - Monitor for any changes in anxiety or depression symptoms after  reducing or stopping lamotrigine.  - Consider referral to an in-house counselor for anxiety and depression management.  - Contact the office if experiencing increased anxiety or depression symptoms after reducing lamotrigine.             Annual physical exam    Attention deficit hyperactivity disorder (ADHD), combined type  -     dextroamphetamine-amphetamine (ADDERALL) 20 mg tablet; Take 1 tablet by mouth 2 (two) times a day.  Dispense: 60 tablet; Refill: 0  -     dextroamphetamine-amphetamine (ADDERALL) 20 mg tablet; Take 1 tablet by mouth 2 (two) times a day.  Dispense: 60 tablet; Refill: 0  -     dextroamphetamine-amphetamine (ADDERALL) 20 mg tablet; Take 1 tablet by mouth 2 (two) times a day.  Dispense: 60 tablet; Refill: 0  -     Ambulatory referral/consult to Primary [facility] Behavioral Health (Non-Opioids); Future; Expected date: 07/14/2025    CML (chronic myelocytic leukemia)    Primary hypertension    Anxiety  -     Ambulatory referral/consult to Primary Care Behavioral Health (Non-Opioids); Future; Expected date: 07/14/2025                This note was generated with the assistance of ambient listening technology. Verbal consent was obtained by the patient and accompanying visitor(s) for the recording of patient appointment to facilitate this note. I attest to having reviewed and edited the generated note for accuracy, though some syntax or spelling errors may persist. Please contact the author of this note for any clarification.      Subjective:           Patient ID: Linh Schmidt   Age:  55 y.o.  Sex: male     Chief Complaint:   Medication Refill      History of Present Illness:    Linh Schmidt is a 55 y.o. male who presents today with a chief complaint of Medication Refill  .      56yo male presenting for a f/u appt.    States has been very lazy after getting off.  Working 10-12 hours and then getting home does not want to do much more.  Is not keeping up as he would like with PSA, and other  "testing/treatments.     ADHD medication refill discussed at visit.    Linh Schmidt is a 55 y.o. male, with ADHD and Anxiety Disorder states he has always been a worrier.  Is taking Dextroamphetamine/amphetamine short acting (Adderall) Daily    Medication has been effective.  Helping with better focus at work, completes tasks more consistently, and feels less distractable    Patient reports the following side effects: denies insomnia, decreased appetite or weight loss, and palpitations.  Only notices the palpitations when combining energy drink with Adderall.  States sleep could be better, but has always been an issue.         Discussed Lamotrigine/Lamicatal was taking 25mg x 2 tabs in evening for mood, but has been off for about a week and is doing well.  Will monitor at home for next few weeks.     Stopped both Lamictal and Allopurinol.                  History of Present Illness    CHIEF COMPLAINT:  Linh presents today for follow up.    MEDICAL HISTORY:  He has a history of Chronic Myelocytic Leukemia (CML), currently on derivative therapy in third response. EKG in August of previous year was normal.    MENTAL HEALTH:  He reports being consistently "high strung" with chronic worrying and ongoing sleep difficulties. He has a family history of anxiety and depression, notably his mother who experiences significant anxiety. He expresses interest in mental health support.    CURRENT MEDICATIONS:  He takes Adderall 20mg twice daily with occasional breaks on weekends and off days (Tuesday/Wednesday), noting significant appetite suppression. He continues Atenolol (beta blocker) regularly and Fluoxetine 40mg which was last filled in December for one year. He receives weekly testosterone injections. He recently discontinued Lamotrigine 25mg (not taken for approximately one week) which was prescribed for anxiety and depression. Allopurinol is currently being discontinued.      ROS:  General: +fatigue, +sleep " "disturbances, +loss of appetite  Cardiovascular: +palpitations  Psychiatric: +anxiety, +depression, +nervousness, +excessive worry           Review of Systems   Constitutional: Negative.  Negative for fatigue and fever.   HENT: Negative.  Negative for congestion, rhinorrhea, sinus pressure and sinus pain.    Eyes: Negative.    Respiratory: Negative.  Negative for cough, shortness of breath and wheezing.    Cardiovascular: Negative.  Negative for chest pain, palpitations and leg swelling.   Gastrointestinal: Negative.  Negative for constipation, diarrhea, nausea and vomiting.   Endocrine: Negative.    Genitourinary: Negative.  Negative for difficulty urinating, frequency and urgency.   Musculoskeletal: Negative.    Skin:  Positive for color change (right bicep bruise).   Allergic/Immunologic: Negative for food allergies.   Neurological:  Negative for weakness and headaches.   Psychiatric/Behavioral:  Positive for decreased concentration and dysphoric mood. Negative for sleep disturbance. The patient is not nervous/anxious.            Objective:        Vitals:    07/07/25 1335   BP: 118/80   BP Location: Right arm   Patient Position: Sitting   Pulse: 64   SpO2: 98%   Weight: 87 kg (191 lb 11 oz)   Height: 5' 9" (1.753 m)       Body mass index is 28.31 kg/m².      Physical Exam  Vitals reviewed.   Constitutional:       General: He is not in acute distress.     Appearance: Normal appearance. He is not ill-appearing.      Comments: As per BMI.   HENT:      Head: Normocephalic and atraumatic.      Right Ear: External ear normal.      Left Ear: External ear normal.      Nose: Nose normal.   Eyes:      Extraocular Movements: Extraocular movements intact.      Conjunctiva/sclera: Conjunctivae normal.   Cardiovascular:      Rate and Rhythm: Normal rate and regular rhythm.      Pulses: Normal pulses.      Heart sounds: No murmur heard.  Pulmonary:      Effort: Pulmonary effort is normal. No respiratory distress.      Breath " "sounds: No wheezing.   Musculoskeletal:         General: No swelling or deformity.      Cervical back: Normal range of motion.      Right lower leg: No edema.      Left lower leg: No edema.   Skin:     Capillary Refill: Capillary refill takes less than 2 seconds.      Findings: Bruising (right bicep) present.   Neurological:      General: No focal deficit present.      Mental Status: He is alert and oriented to person, place, and time.      Motor: No weakness.      Gait: Gait normal.   Psychiatric:         Mood and Affect: Mood normal.         Physical Exam                    Past Medical History[1]    Lab Results   Component Value Date     02/11/2025    K 4.4 02/11/2025     02/11/2025    CO2 25 02/11/2025    BUN 17 02/11/2025    CREATININE 1.2 02/11/2025    ANIONGAP 10 02/11/2025     Lab Results   Component Value Date    HGBA1C 5.2 02/11/2025     No results found for: "BNP", "BNPTRIAGEBLO"    Lab Results   Component Value Date    WBC 7.61 02/11/2025    HGB 16.2 02/11/2025    HCT 48.0 02/11/2025     02/11/2025    GRAN 5.2 02/11/2025    GRAN 67.8 02/11/2025     Lab Results   Component Value Date    CHOL 199 02/11/2025    HDL 62 02/11/2025    LDLCALC 106.6 02/11/2025    TRIG 152 (H) 02/11/2025        Encounter Medications[2]              [1]   Past Medical History:  Diagnosis Date    Anxiety     GERD (gastroesophageal reflux disease)     Hypertension     Osteoarthritis    [2]   Outpatient Encounter Medications as of 7/7/2025   Medication Sig Dispense Refill    atenoloL (TENORMIN) 50 MG tablet Take 1 tablet (50 mg total) by mouth 2 (two) times daily. 60 tablet 11    dasatinib (SPRYCEL) 100 mg Take 1 tablet (100 mg total) by mouth once daily. 30 tablet 0    FLUoxetine 40 MG capsule Take 1 capsule (40 mg total) by mouth once daily. 90 capsule 3    ibuprofen (ADVIL,MOTRIN) 800 MG tablet Take 800 mg by mouth every 8 (eight) hours as needed.      multivitamin capsule Take 1 capsule by mouth once daily.   "    tamsulosin (FLOMAX) 0.4 mg Cap TAKE 1 CAPSULE (0.4 MG TOTAL) BY MOUTH ONCE DAILY. 30 capsule 11    testosterone cypionate (DEPOTESTOTERONE CYPIONATE) 200 mg/mL injection INJECT 1 ML INTRAMUSCULARLY EVERY WEEK  1    [DISCONTINUED] allopurinoL (ZYLOPRIM) 300 MG tablet TAKE 1 TABLET (300 MG TOTAL) BY MOUTH ONCE DAILY. 30 tablet 3    [DISCONTINUED] dextroamphetamine-amphetamine (ADDERALL) 20 mg tablet Take 1 tablet by mouth 2 (two) times a day. 60 tablet 0    [DISCONTINUED] dextroamphetamine-amphetamine (ADDERALL) 20 mg tablet Take 1 tablet by mouth 2 (two) times a day. 60 tablet 0    [DISCONTINUED] dextroamphetamine-amphetamine (ADDERALL) 20 mg tablet Take 1 tablet by mouth 2 (two) times a day. 60 tablet 0    dextroamphetamine-amphetamine (ADDERALL) 20 mg tablet Take 1 tablet by mouth 2 (two) times a day. 60 tablet 0    [START ON 8/6/2025] dextroamphetamine-amphetamine (ADDERALL) 20 mg tablet Take 1 tablet by mouth 2 (two) times a day. 60 tablet 0    [START ON 9/5/2025] dextroamphetamine-amphetamine (ADDERALL) 20 mg tablet Take 1 tablet by mouth 2 (two) times a day. 60 tablet 0    lamoTRIgine (LAMICTAL) 25 MG tablet Take 2 tablets (50 mg total) by mouth every evening. (Patient not taking: Reported on 7/7/2025) 30 tablet 11     No facility-administered encounter medications on file as of 7/7/2025.

## 2025-07-08 ENCOUNTER — PATIENT MESSAGE (OUTPATIENT)
Dept: PSYCHIATRY | Facility: CLINIC | Age: 55
End: 2025-07-08
Payer: COMMERCIAL

## 2025-07-09 ENCOUNTER — TELEPHONE (OUTPATIENT)
Dept: BEHAVIORAL HEALTH | Facility: CLINIC | Age: 55
End: 2025-07-09
Payer: COMMERCIAL

## 2025-07-09 NOTE — PROGRESS NOTES
CHW reached out to pt x 2. Received message that the person you called has a voice mail that has not been set up yet. No answer, unable to LVM. Sent 2nd portal message, sent [facility] intro via portal on yesterday. Pt referred by Dr. NATALI Dumont.

## 2025-07-14 ENCOUNTER — TELEPHONE (OUTPATIENT)
Dept: BEHAVIORAL HEALTH | Facility: CLINIC | Age: 55
End: 2025-07-14
Payer: COMMERCIAL

## 2025-07-14 NOTE — PROGRESS NOTES
CHW reached out to pt x 3. Unable to reach pt or leave voice messages, not set up yet, x 3. Sent portal messages, will close on 7/21/2025 if no response from pt.

## 2025-07-15 ENCOUNTER — TELEPHONE (OUTPATIENT)
Dept: BEHAVIORAL HEALTH | Facility: CLINIC | Age: 55
End: 2025-07-15
Payer: COMMERCIAL

## 2025-07-15 NOTE — PROGRESS NOTES
Contacted patient for [facility] intake and to schedule a new pt appt, no answer, voice mailbox is not set up yet. Sent portal message for pt to pls call by 7/21/2025 to schedule, 3rd attempt to contact pt.

## 2025-08-04 DIAGNOSIS — C92.10 CHRONIC MYELOID LEUKEMIA: ICD-10-CM

## 2025-08-05 RX ORDER — DASATINIB 100 MG/1
100 TABLET, FILM COATED ORAL DAILY
Qty: 30 TABLET | Refills: 0 | Status: SHIPPED | OUTPATIENT
Start: 2025-08-05

## 2025-08-18 ENCOUNTER — PATIENT MESSAGE (OUTPATIENT)
Dept: PRIMARY CARE CLINIC | Facility: CLINIC | Age: 55
End: 2025-08-18
Payer: COMMERCIAL

## 2025-08-18 DIAGNOSIS — F41.9 ANXIETY: ICD-10-CM

## 2025-08-18 RX ORDER — LAMOTRIGINE 25 MG/1
50 TABLET ORAL NIGHTLY
Qty: 30 TABLET | Refills: 11 | Status: CANCELLED | OUTPATIENT
Start: 2025-08-18